# Patient Record
Sex: MALE | Race: WHITE | NOT HISPANIC OR LATINO | Employment: FULL TIME | URBAN - METROPOLITAN AREA
[De-identification: names, ages, dates, MRNs, and addresses within clinical notes are randomized per-mention and may not be internally consistent; named-entity substitution may affect disease eponyms.]

---

## 2019-01-07 RX ORDER — OXYCODONE AND ACETAMINOPHEN 5; 325 MG/1; MG/1
1 TABLET ORAL
COMMUNITY
End: 2022-08-29

## 2019-01-10 ENCOUNTER — OFFICE VISIT (OUTPATIENT)
Dept: FAMILY MEDICINE | Facility: CLINIC | Age: 64
End: 2019-01-10
Payer: COMMERCIAL

## 2019-01-10 VITALS
SYSTOLIC BLOOD PRESSURE: 160 MMHG | HEART RATE: 90 BPM | RESPIRATION RATE: 16 BRPM | DIASTOLIC BLOOD PRESSURE: 100 MMHG | TEMPERATURE: 97.1 F | HEIGHT: 72 IN | WEIGHT: 279 LBS | OXYGEN SATURATION: 96 % | BODY MASS INDEX: 37.79 KG/M2

## 2019-01-10 DIAGNOSIS — N20.0 KIDNEY STONE: ICD-10-CM

## 2019-01-10 DIAGNOSIS — R03.0 ELEVATED BLOOD PRESSURE READING WITHOUT DIAGNOSIS OF HYPERTENSION: ICD-10-CM

## 2019-01-10 DIAGNOSIS — R73.9 HYPERGLYCEMIA: Primary | ICD-10-CM

## 2019-01-10 DIAGNOSIS — Z12.11 SPECIAL SCREENING FOR MALIGNANT NEOPLASMS, COLON: ICD-10-CM

## 2019-01-10 LAB — HBA1C MFR BLD: 7.5 % (ref 0–5.6)

## 2019-01-10 PROCEDURE — 83036 HEMOGLOBIN GLYCOSYLATED A1C: CPT | Performed by: FAMILY MEDICINE

## 2019-01-10 PROCEDURE — 99203 OFFICE O/P NEW LOW 30 MIN: CPT | Performed by: FAMILY MEDICINE

## 2019-01-10 PROCEDURE — 36415 COLL VENOUS BLD VENIPUNCTURE: CPT | Performed by: FAMILY MEDICINE

## 2019-01-10 RX ORDER — METFORMIN HCL 500 MG
500 TABLET, EXTENDED RELEASE 24 HR ORAL
Qty: 30 TABLET | Refills: 3 | Status: SHIPPED | OUTPATIENT
Start: 2019-01-10 | End: 2022-08-29

## 2019-01-10 RX ORDER — SULFAMETHOXAZOLE/TRIMETHOPRIM 800-160 MG
1 TABLET ORAL
COMMUNITY
Start: 2019-01-08 | End: 2019-01-10

## 2019-01-10 SDOH — HEALTH STABILITY: MENTAL HEALTH: HOW OFTEN DO YOU HAVE A DRINK CONTAINING ALCOHOL?: MONTHLY OR LESS

## 2019-01-10 SDOH — HEALTH STABILITY: MENTAL HEALTH: HOW MANY STANDARD DRINKS CONTAINING ALCOHOL DO YOU HAVE ON A TYPICAL DAY?: 1 OR 2

## 2019-01-10 SDOH — HEALTH STABILITY: MENTAL HEALTH: HOW OFTEN DO YOU HAVE 6 OR MORE DRINKS ON ONE OCCASION?: NEVER

## 2019-01-10 ASSESSMENT — MIFFLIN-ST. JEOR: SCORE: 2085.6

## 2019-01-10 NOTE — PROGRESS NOTES
SUBJECTIVE:   Rohit William is a 63 year old male who presents to clinic today for the following health issues:    Dad with dm diagnosis in 40's. Sister with dm diagnosis 46yo.   No pop. Multiple grain bread. No increased thrist. Exercise needs more. No blurry vision or fatigue.   Dad with mi - 63yo. Brother mi at 68yo.   Patient non-smoker. No chest pain or shortness of breath. Eye exam 2 years  - ok.   Normal blood pressure in past and outside blood pressure ok. ?lipid years.   Colonoscopy will due in future. No black or bloody stools. No family history colon cancer. Marriage ok. And one grandchildren 3yo.   Follow-up kidney on right side. (had another 7 years ago - passed on own).  Urology to see tomorrow. Not much pain. No fevers or chills. No nausea, vomiting or diarrhea.   Coffee in AM. No ALCOHOL. No feet changes.         Hospital Follow-up Visit:    Hospital/Nursing Home/IP Rehab Facility: [facility]  Date of Admission: 01-  Date of Discharge: 01-  Reason(s) for Admission: had stent put in / had surgery to break it up/ he has an appt tomorrow           Coding guidelines for this visit:  Type of Medical   Decision Making Face-to-Face Visit       within 7 Days of discharge Face-to-Face Visit        within 14 days of discharge   Moderate Complexity 96475 [id number]   High Complexity 16098 [id number]              -------------------------------------    Problem list and histories reviewed & adjusted, as indicated.  Additional history: as documented    There is no problem list on file for this patient.    No past surgical history on file.    Social History     Tobacco Use     Smoking status: Never Smoker     Smokeless tobacco: Never Used   Substance Use Topics     Alcohol use: Yes     Frequency: Monthly or less     Drinks per session: 1 or 2     Binge frequency: Never     Family History   Problem Relation Age of Onset     Alzheimer Disease Mother      Heart Disease Father      Diabetes Sister       "Coronary Artery Disease Brother            Reviewed and updated as needed this visit by clinical staff  Tobacco  Allergies  Meds  Fam Hx  Soc Hx      Reviewed and updated as needed this visit by Provider         ROS:  All other ROS negative.     OBJECTIVE:     BP (!) 160/100   Pulse 90   Temp 97.1  F (36.2  C) (Oral)   Resp 16   Ht 1.816 m (5' 11.5\")   Wt 126.6 kg (279 lb)   SpO2 96%   BMI 38.37 kg/m    Body mass index is 38.37 kg/m .  GENERAL: healthy, alert and no distress  EYES: Eyes grossly normal to inspection, PERRL and conjunctivae and sclerae normal  HENT: ear canals and TM's normal, nose and mouth without ulcers or lesions  NECK: no adenopathy, no asymmetry, masses, or scars and thyroid normal to palpation  RESP: lungs clear to auscultation - no rales, rhonchi or wheezes  CV: regular rate and rhythm, normal S1 S2, no S3 or S4, no murmur, click or rub, no peripheral edema and peripheral pulses strong  ABDOMEN: soft, nontender, no hepatosplenomegaly, no masses and bowel sounds normal  MS: no gross musculoskeletal defects noted, no edema  SKIN: no suspicious lesions or rashes  NEURO: Normal strength and tone, mentation intact and speech normal  PSYCH: mentation appears normal, affect normal/bright and mildly anxious        ASSESSMENT/PLAN:     ASSESSMENT / PLAN:  (R73.9) Hyperglycemia  (primary encounter diagnosis)  Comment: early dm  Plan: Hemoglobin A1c, metFORMIN (GLUCOPHAGE-XR) 500         MG 24 hr tablet        Reveiwed risks and side effects of medication  Diet/exercise. Recheck in 3 months -fasting  Formal dm ed if worse. Call/email with questions/concerns. Follow-up eye exam    (Z12.11) Special screening for malignant neoplasms, colon    Plan: GASTROENTEROLOGY ADULT REF PROCEDURE ONLY [facility] [phone]; [facility]         Surgery        Set-up in next 1-2 months.     (N20.0) Kidney stone  Comment: doing well  Plan: per urology. Risk should go down with sugars " improving. More water. Limit caffeine.     (R03.0) Elevated blood pressure reading without diagnosis of hypertension  Comment: ok outside blood pressure reading  Plan: lisinopril likely in future.         Martin Marsh MD  [facility]

## 2022-08-26 NOTE — PROGRESS NOTES
"SUBJECTIVE:   Rohit William is a 67 year old male who presents for Preventive Visit.  History hyperglycemia, htn and kidney stones.  Keto diet and low carb and weight loss. Wife helpful.   Overall feeling better.   Eye exam 2  Years ago. No chest pain or shortness of breath.   Outside blood pressure a little high but needs new machine. No nausea, vomiting or diarrhea or black/bloody stools. No colonoscopy in past. No urine changes or hematuria.   Sleep can by interupted by nocturia.   No blurry vision or numbness.   Lots of water. Limited caffeine. No caffeine.   Emotionally doing ok. . 2 girls and 3 grandkids.   Exercise  - like to golf. Will join gym. Meddles in winter.   No rash/mole. Testicle pain/masses/hernia.   Taking vitaminD. No more kidney stones.   Dad with dm diagnosis in 40's. Sister with dm diagnosis 46yo.   Dad with mi - 63yo. Brother mi at 68yo  Patient has been advised of split billing requirements and indicates understanding: Yes  Are you in the first 12 months of your Medicare coverage?  No    Healthy Habits:     In general, how would you rate your overall health?  Good    Frequency of exercise:  1 day/week    Duration of exercise:  Less than 15 minutes    Do you usually eat at least 4 servings of fruit and vegetables a day, include whole grains    & fiber and avoid regularly eating high fat or \"junk\" foods?  Yes    Taking medications regularly:  Yes    Medication side effects:  Not applicable    Ability to successfully perform activities of daily living:  No assistance needed    Home Safety:  No safety concerns identified    Hearing Impairment:  No hearing concerns    In the past 6 months, have you been bothered by leaking of urine? Yes    In general, how would you rate your overall mental or emotional health?  Excellent      PHQ-2 Total Score: 0    Do you feel safe in your environment? Yes    Have you ever done Advance Care Planning? (For example, a Health Directive, POLST, or a " discussion with a medical provider or your loved ones about your wishes): No, advance care planning information given to patient to review.  Patient declined advance care planning discussion at this time.    Normal conversatoinal hearing  Fall risk  Fallen 2 or more times in the past year?: No  Any fall with injury in the past year?: No    Cognitive Screening   1) Repeat 3 items (Leader, Season, Table)    2) Clock draw: NORMAL  3) 3 item recall: Recalls 1 object   Results: NORMAL clock, 1-2 items recalled: COGNITIVE IMPAIRMENT LESS LIKELY    Mini-CogTM Copyright S Deborah. Licensed by the author for use in [facility]; reprinted with permission (kayla@[facility]). All rights reserved.      Do you have sleep apnea, excessive snoring or daytime drowsiness?: no    Reviewed and updated as needed this visit by clinical staff   Tobacco   Meds   Med Hx  Surg Hx  Fam Hx  Soc Hx          Reviewed and updated as needed this visit by Provider                   Social History     Tobacco Use     Smoking status: Never Smoker     Smokeless tobacco: Never Used   Substance Use Topics     Alcohol use: Yes         Alcohol Use 8/29/2022   Prescreen: >3 drinks/day or >7 drinks/week? No             Patient Care Team:  Clinic - [facility] as PCP - General (Clinic)    The following health maintenance items are reviewed in Epic and correct as of today:  Health Maintenance Due   Topic Date Due     ANNUAL REVIEW OF HM ORDERS  Never done     COVID-19 Vaccine (1) Never done     COLORECTAL CANCER SCREENING  Never done     HEPATITIS C SCREENING  Never done     DTAP/TDAP/TD IMMUNIZATION (1 - Tdap) Never done     LIPID  Never done     ZOSTER IMMUNIZATION (1 of 2) Never done     Pneumococcal Vaccine: 65+ Years (1 - PCV) Never done     AORTIC ANEURYSM SCREENING (SYSTEM ASSIGNED)  Never done     INFLUENZA VACCINE (1) 09/01/2022       Review of Systems   Genitourinary: Positive for frequency, impotence and urgency.  "        OBJECTIVE:   BP (!) 166/100   Pulse 85   Temp 98.7  F (37.1  C) (Oral)   Resp 20   Ht 1.803 m (5' 11\")   Wt 97 kg (213 lb 12.8 oz)   SpO2 97%   BMI 29.82 kg/m   Estimated body mass index is 29.82 kg/m  as calculated from the following:    Height as of this encounter: 1.803 m (5' 11\").    Weight as of this encounter: 97 kg (213 lb 12.8 oz).  Physical Exam  GENERAL: healthy, alert and no distress  EYES: Eyes grossly normal to inspection, PERRL and conjunctivae and sclerae normal  HENT: ear canals and TM's normal, nose and mouth without ulcers or lesions  NECK: no adenopathy, no asymmetry, masses, or scars and thyroid normal to palpation  RESP: lungs clear to auscultation - no rales, rhonchi or wheezes  BREAST: normal without masses, tenderness or nipple discharge and no palpable axillary masses or adenopathy  CV: regular rate and rhythm, normal S1 S2, no S3 or S4, no murmur, click or rub, no peripheral edema and peripheral pulses strong  ABDOMEN: soft, nontender, no hepatosplenomegaly, no masses and bowel sounds normal   (male): patient deferred /rectal exams.   MS: no gross musculoskeletal defects noted, no edema  SKIN: no suspicious lesions or rashes  NEURO: Normal strength and tone, mentation intact and speech normal  NEURO: good pulses/sensation feet  PSYCH: mentation appears normal, affect normal/bright  PSYCH: mildly anxious  LYMPH: no cervical, supraclavicular, axillary, or inguinal adenopathy      ASSESSMENT / PLAN:   ASSESSMENT / PLAN:  (Z00.00) Encounter for annual wellness visit (AWV) in Medicare patient  (primary encounter diagnosis)  Comment: generally normal exam. No falls and memory ok  Plan: Reviewed self mole/testicle check handout.  VitaminD. Follow-up eye exam    (R73.9) Hyperglycemia  Plan: Renal panel, Hemoglobin A1c            (Z12.5) Screening PSA (prostate specific antigen)  Plan: Prostate Specific Antigen Screen            (I10) Hypertension goal BP (blood pressure) < " "140/90  Comment: needs help. A little anxious too  Plan: Renal panel, losartan (COZAAR) 50 MG tablet        Add norvasc if needed. Self-monitor chest pain or shortness of breath to er. Recheck in 3 months. WAIT one week to start med. Reveiwed risks and side effects of medication        (E11.69) Type 2 diabetes mellitus with other specified complication, unspecified whether long term insulin use (H)  Comment: new   Plan: AMB Adult Diabetes Educator Referral, metFORMIN        (GLUCOPHAGE XR) 500 MG 24 hr tablet        Reveiwed risks and side effects of medication  Follow-up dm ed. Continue exercise and diet. Recheck in 3 months      (Z12.11) Colon cancer screening  Plan: Adult GI  Referral - Procedure Only        Set-up late fall once sugars better.       Patient has been advised of split billing requirements and indicates understanding: Yes    COUNSELING:  Reviewed preventive health counseling, as reflected in patient instructions       Regular exercise       Healthy diet/nutrition       Vision screening       Hearing screening       Dental care       Bladder control       Fall risk prevention       Aspirin prophylaxis        Alcohol Use        Colon cancer screening       Prostate cancer screening    Estimated body mass index is 29.82 kg/m  as calculated from the following:    Height as of this encounter: 1.803 m (5' 11\").    Weight as of this encounter: 97 kg (213 lb 12.8 oz).    Weight management plan: Discussed healthy diet and exercise guidelines    He reports that he has never smoked. He has never used smokeless tobacco.      Appropriate preventive services were discussed with this patient, including applicable screening as appropriate for cardiovascular disease, diabetes, osteopenia/osteoporosis, and glaucoma.  As appropriate for age/gender, discussed screening for colorectal cancer, prostate cancer, breast cancer, and cervical cancer. Checklist reviewing preventive services available has been given " to the patient.    Reviewed patients plan of care and provided an AVS. The Intermediate Care Plan ( asthma action plan, low back pain action plan, and migraine action plan) for Rohit meets the Care Plan requirement. This Care Plan has been established and reviewed with the Patient.    Counseling Resources:  ATP IV Guidelines  Pooled Cohorts Equation Calculator  Breast Cancer Risk Calculator  Breast Cancer: Medication to Reduce Risk  FRAX Risk Assessment  ICSI Preventive Guidelines  Dietary Guidelines for Americans, 2010  USDA's MyPlate  ASA Prophylaxis  Lung CA Screening    Martin Marsh MD  [facility]    Identified Health Risks:

## 2022-08-29 ENCOUNTER — OFFICE VISIT (OUTPATIENT)
Dept: FAMILY MEDICINE | Facility: CLINIC | Age: 67
End: 2022-08-29
Payer: COMMERCIAL

## 2022-08-29 VITALS
SYSTOLIC BLOOD PRESSURE: 176 MMHG | HEIGHT: 71 IN | OXYGEN SATURATION: 97 % | WEIGHT: 213.8 LBS | DIASTOLIC BLOOD PRESSURE: 104 MMHG | RESPIRATION RATE: 20 BRPM | TEMPERATURE: 98.7 F | HEART RATE: 83 BPM | BODY MASS INDEX: 29.93 KG/M2

## 2022-08-29 DIAGNOSIS — Z00.00 ENCOUNTER FOR ANNUAL WELLNESS VISIT (AWV) IN MEDICARE PATIENT: Primary | ICD-10-CM

## 2022-08-29 DIAGNOSIS — Z12.5 SCREENING PSA (PROSTATE SPECIFIC ANTIGEN): ICD-10-CM

## 2022-08-29 DIAGNOSIS — E11.69 TYPE 2 DIABETES MELLITUS WITH OTHER SPECIFIED COMPLICATION, UNSPECIFIED WHETHER LONG TERM INSULIN USE (H): ICD-10-CM

## 2022-08-29 DIAGNOSIS — I10 HYPERTENSION GOAL BP (BLOOD PRESSURE) < 140/90: ICD-10-CM

## 2022-08-29 DIAGNOSIS — Z12.11 COLON CANCER SCREENING: ICD-10-CM

## 2022-08-29 DIAGNOSIS — R73.9 HYPERGLYCEMIA: ICD-10-CM

## 2022-08-29 LAB
ALBUMIN SERPL-MCNC: 3.9 G/DL (ref 3.4–5)
ANION GAP SERPL CALCULATED.3IONS-SCNC: 6 MMOL/L (ref 3–14)
BUN SERPL-MCNC: 16 MG/DL (ref 7–30)
CALCIUM SERPL-MCNC: 9.5 MG/DL (ref 8.5–10.1)
CHLORIDE BLD-SCNC: 99 MMOL/L (ref 94–109)
CO2 SERPL-SCNC: 27 MMOL/L (ref 20–32)
CREAT SERPL-MCNC: 0.63 MG/DL (ref 0.66–1.25)
GFR SERPL CREATININE-BSD FRML MDRD: >90 ML/MIN/1.73M2
GLUCOSE BLD-MCNC: 331 MG/DL (ref 70–99)
HBA1C MFR BLD: 12.9 % (ref 0–5.6)
PHOSPHATE SERPL-MCNC: 3.7 MG/DL (ref 2.5–4.5)
POTASSIUM BLD-SCNC: 5 MMOL/L (ref 3.4–5.3)
PSA SERPL-MCNC: 1.59 UG/L (ref 0–4)
SODIUM SERPL-SCNC: 132 MMOL/L (ref 133–144)

## 2022-08-29 PROCEDURE — G0103 PSA SCREENING: HCPCS | Performed by: FAMILY MEDICINE

## 2022-08-29 PROCEDURE — 99204 OFFICE O/P NEW MOD 45 MIN: CPT | Mod: 25 | Performed by: FAMILY MEDICINE

## 2022-08-29 PROCEDURE — 80069 RENAL FUNCTION PANEL: CPT | Performed by: FAMILY MEDICINE

## 2022-08-29 PROCEDURE — 83036 HEMOGLOBIN GLYCOSYLATED A1C: CPT | Performed by: FAMILY MEDICINE

## 2022-08-29 PROCEDURE — 36415 COLL VENOUS BLD VENIPUNCTURE: CPT | Performed by: FAMILY MEDICINE

## 2022-08-29 PROCEDURE — G0438 PPPS, INITIAL VISIT: HCPCS | Performed by: FAMILY MEDICINE

## 2022-08-29 RX ORDER — METFORMIN HCL 500 MG
500 TABLET, EXTENDED RELEASE 24 HR ORAL 2 TIMES DAILY WITH MEALS
Qty: 60 TABLET | Refills: 3 | Status: SHIPPED | OUTPATIENT
Start: 2022-08-29 | End: 2022-09-20

## 2022-08-29 RX ORDER — LOSARTAN POTASSIUM 50 MG/1
50 TABLET ORAL DAILY
Qty: 30 TABLET | Refills: 3 | Status: SHIPPED | OUTPATIENT
Start: 2022-08-29 | End: 2024-05-07

## 2022-08-29 ASSESSMENT — ENCOUNTER SYMPTOMS: FREQUENCY: 1

## 2022-08-29 ASSESSMENT — ACTIVITIES OF DAILY LIVING (ADL): CURRENT_FUNCTION: NO ASSISTANCE NEEDED

## 2022-08-29 ASSESSMENT — PAIN SCALES - GENERAL: PAINLEVEL: NO PAIN (0)

## 2022-08-29 NOTE — LETTER
August 30, 2022      Rohit William  [address] [place] [facility] [id number]        Dear ,    We are writing to inform you of your test results.    Generally normal results except diabetes. Kidneys ok. Follow-up diabetic education and myself in 3months to recheck blood pressure and blood sugars. Continue low carb diet/exercise and drink lots of water. Call/email with questions/concerns. Normal prostate test.      Resulted Orders   Prostate Specific Antigen Screen   Result Value Ref Range    Prostate Specific Antigen Screen 1.59 0.00 - 4.00 ug/L   Renal panel   Result Value Ref Range    Sodium 132 (L) 133 - 144 mmol/L    Potassium 5.0 3.4 - 5.3 mmol/L    Chloride 99 94 - 109 mmol/L    Carbon Dioxide (CO2) 27 20 - 32 mmol/L    Anion Gap 6 3 - 14 mmol/L    Urea Nitrogen 16 7 - 30 mg/dL    Creatinine 0.63 (L) 0.66 - 1.25 mg/dL    Calcium 9.5 8.5 - 10.1 mg/dL    Glucose 331 (H) 70 - 99 mg/dL    Albumin 3.9 3.4 - 5.0 g/dL    Phosphorus 3.7 2.5 - 4.5 mg/dL    GFR Estimate >90 >60 mL/min/1.73m2      Comment:      Effective December 21, 2021 eGFRcr in adults is calculated using the 2021 CKD-EPI creatinine equation which includes age and gender (Adriane et al., NEJM, DOI: 10.1056/QBWNhf7310905)   Hemoglobin A1c   Result Value Ref Range    Hemoglobin A1C 12.9 (H) 0.0 - 5.6 %      Comment:      First run 13.1, repeated as 12.9.  Normal <5.7%   Prediabetes 5.7-6.4%    Diabetes 6.5% or higher     Note: Adopted from ADA consensus guidelines.       If you have any questions or concerns, please call the clinic at the number listed above.       Sincerely,      Martin Marsh MD/[facility]inald

## 2022-08-30 ENCOUNTER — TELEPHONE (OUTPATIENT)
Dept: FAMILY MEDICINE | Facility: CLINIC | Age: 67
End: 2022-08-30

## 2022-08-31 ENCOUNTER — TELEPHONE (OUTPATIENT)
Dept: FAMILY MEDICINE | Facility: CLINIC | Age: 67
End: 2022-08-31

## 2022-08-31 NOTE — TELEPHONE ENCOUNTER
Diabetes Education Scheduling Outreach #1:    Call to patient to schedule. Left message with phone number to call to schedule.    Also sent [employer] message for second attempt. Requested patient to call to schedule.    Magdalena Zelaya OnCall  Diabetes and Nutrition Scheduling

## 2022-09-20 ENCOUNTER — VIRTUAL VISIT (OUTPATIENT)
Dept: EDUCATION SERVICES | Facility: CLINIC | Age: 67
End: 2022-09-20
Attending: FAMILY MEDICINE
Payer: COMMERCIAL

## 2022-09-20 ENCOUNTER — TELEPHONE (OUTPATIENT)
Dept: EDUCATION SERVICES | Facility: CLINIC | Age: 67
End: 2022-09-20

## 2022-09-20 DIAGNOSIS — E11.69 TYPE 2 DIABETES MELLITUS WITH OTHER SPECIFIED COMPLICATION, UNSPECIFIED WHETHER LONG TERM INSULIN USE (H): ICD-10-CM

## 2022-09-20 PROCEDURE — G0108 DIAB MANAGE TRN  PER INDIV: HCPCS | Mod: AE

## 2022-09-20 RX ORDER — BLOOD-GLUCOSE METER
EACH MISCELLANEOUS
Qty: 1 KIT | Refills: 0 | Status: SHIPPED | OUTPATIENT
Start: 2022-09-20

## 2022-09-20 RX ORDER — METFORMIN HCL 500 MG
TABLET, EXTENDED RELEASE 24 HR ORAL
Qty: 90 TABLET | Refills: 2 | Status: SHIPPED | OUTPATIENT
Start: 2022-09-20 | End: 2022-10-18

## 2022-09-20 NOTE — LETTER
9/20/2022         RE: Rohit William  [address] [facility] [address]        Dear Colleague,    Thank you for referring your patient, Rohit William, to the [facility] [place]. Please see a copy of my visit note below.    Diabetes Self-Management Education & Support    Presents for: Initial Assessment for new diagnosis    Type of Visit: Telephone Visit: 51 minutes    How would patient like to obtain AVS? MyChart    ASSESSMENT:    Telephone visit with Rohit this morning for new Dx of DM type 2.  He reports he has been  losing weight throughout this past year.  Was following a Keto diet.  Stopped going out for lunch.  Typically eats 2-3 times per day with some evening snacking.  Does not drink sugar-sweetened beverages.      Did purchase a meter on his own, BG test has been in the 300 mg/dL's thus far.  Ordered a meter and supplies for pt today.  Reviewed testing goals.   Work day consists of sitting or driving most of the day.  Does not do a lot of physical activity currently, but has plans to join the [employer].  Enjoys golfing.    Recommend increasing Metformin to 1500 mg/day due to BG consistently higher than 200 mg/dL.  Will follow up in 4 weeks to assess.    Patient's most recent   Lab Results   Component Value Date    A1C 12.9 08/29/2022    A1C 7.5 01/10/2019    is not meeting goal of <8.0    Diabetes knowledge and skills assessment:   Patient is knowledgeable in diabetes management concepts related to: Healthy Eating    Continue education with the following diabetes management concepts: Healthy Eating, Being Active, Monitoring, Taking Medication, Reducing Risks and Healthy Coping    Based on learning assessment above, most appropriate setting for further diabetes education would be: Individual setting.    INTERVENTIONS:    Education provided today on:  AADE Self-Care Behaviors:  Care Plan: Diabetes   Updates made by Greta Shah since 9/20/2022 12:00 AM       Problem: HbA1C Not In Goal       Goal: Establish Regular Follow-Ups with PCP       Task: Discuss with PCP the recommended timing for patient's next follow up visit(s)    Responsible User: Greta Shah      Task: Discuss schedule for PCP visits with patient    Responsible User: Greta Shah      Goal: Get HbA1C Level in Goal       Task: Educate patient on diabetes education self-management topics    Responsible User: Greta Shah      Task: Educate patient on benefits of regular glucose monitoring    Responsible User: Greta Shah      Task: Refer patient to appropriate extended care team member, as needed (Medication Therapy Management, Behavioral Health, Physical Therapy, etc.)    Responsible User: Greta Shah      Task: Discuss diabetes treatment plan with patient    Responsible User: Greta Shah      Problem: Diabetes Self-Management Education Needed to Optimize Self-Care Behaviors       Goal: Understand diabetes pathophysiology and disease progression       Task: Provide education on diabetes pathophysiology and disease progression specfic to patient's diabetes type Completed 9/20/2022   Responsible User: Greta Shah      Goal: Healthy Eating - follow a healthy eating pattern for diabetes       Task: Provide education on portion control and consistency in amount, composition and timing of food intake Completed 9/20/2022   Responsible User: Greta Shah      Task: Provide education on managing carbohydrate intake (carbohydrate counting, plate planning method, etc.) Completed 9/20/2022   Responsible User: Greta Shah      Task: Provide education on weight management    Responsible User: Greta Shah      Task: Provide education on heart healthy eating    Responsible User: Greta Shah      Task: Provide education on eating out    Responsible User: Greta Shah       Task: Develop individualized healthy eating plan with patient Completed 9/20/2022   Responsible User: Greta Shah      Goal: Being Active - get regular physical activity, working up to at least 150 minutes per week    This Visit's Progress: 0%   Note:    My Goal: I will join [facility] with goal to increase physical activity    What I need to meet my goal: join and explore activities available    I plan to meet my goal by this date: 6 weeks      Task: Provide education on relationship of activity to glucose and precautions to take if at risk for low glucose Completed 9/20/2022   Responsible User: rGeta Shah      Task: Discuss barriers to physical activity with patient Completed 9/20/2022   Responsible User: Greta Shah      Task: Develop physical activity plan with patient Completed 9/20/2022   Responsible User: Greta Shah      Task: Explore community resources including walking groups, assistance programs, and home videos    Responsible User: Greta Shah      Goal: Monitoring - monitor glucose and ketones as directed    This Visit's Progress: 10%   Note:    My Goal: I will test BG at least once per day     What I need to meet my goal: meter, supplies and goals     I plan to meet my goal by this date: 3 months      Task: Provide education on blood glucose monitoring (purpose, proper technique, frequency, glucose targets, interpreting results, when to use glucose control solution, sharps disposal) Completed 9/20/2022   Responsible User: Greta Shah      Task: Provide education on continuous glucose monitoring (sensor placement, use of abdi or /reader, understanding glucose trends, alerts and alarms, differences between sensor glucose and blood glucose)    Responsible User: Greta Shah      Task: Provide education on ketone monitoring (when to monitor, frequency, etc.)    Responsible User: Greta Shah       Goal: Taking Medication - patient is consistently taking medications as directed       Task: Provide education on action of prescribed medication, including when to take and possible side effects Completed 9/20/2022   Responsible User: Greta Shah      Task: Provide education on insulin and injectable diabetes medications, including administration, storage, site selection and rotation for injection sites    Responsible User: Greta Shah      Task: Discuss barriers to medication adherence with patient and provide management technique ideas as appropriate    Responsible User: Greta Shah      Task: Provide education on frequency and refill details of medications    Responsible User: Greta Shah      Goal: Problem Solving - know how to prevent and manage short-term diabetes complications       Task: Provide education on high blood glucose - causes, signs/symptoms, prevention and treatment    Responsible User: Greta Shah      Task: Provide education on low blood glucose - causes, signs/symptoms, prevention, treatment, carrying a carbohydrate source at all times, and medical identification    Responsible User: Greta Shah      Task: Provide education on safe travel with diabetes    Responsible User: Greta Shah      Task: Provide education on how to care for diabetes on sick days    Responsible User: Greta Shah      Task: Provide education on when to call a health care provider    Responsible User: Greta Shah      Goal: Reducing Risks - know how to prevent and treat long-term diabetes complications       Task: Provide education on major complications of diabetes, prevention, early diagnostic measures and treatment of complications    Responsible User: Greta Shah      Task: Provide education on recommended care for dental, eye and foot health    Responsible User: Greta Shah       Task: Provide education on Hemoglobin A1c - goals and relationship to blood glucose levels    Responsible User: Greta Shah      Task: Provide education on recommendations for heart health - lipid levels and goals, blood pressure and goals, and aspirin therapy, if indicated    Responsible User: Greta Shah      Task: Provide education on tobacco cessation    Responsible User: Greta Shah      Goal: Healthy Coping - use available resources to cope with the challenges of managing diabetes       Task: Discuss recognizing feelings about having diabetes    Responsible User: Greta Shah      Task: Provide education on the benefits of making appropriate lifestyle changes    Responsible User: Greta Shah      Task: Provide education on benefits of utilizing support systems    Responsible User: Greta Shah      Task: Discuss methods for coping with stress    Responsible User: Greta Shah      Task: Provide education on when to seek professional counseling    Responsible User: Greta Shah          Opportunities for ongoing education and support in diabetes-self management were discussed. Pt verbalized understanding of concepts discussed and recommendations provided today.       Education Materials Provided:   [employer] [place] Healthy Living with Diabetes Book          PLAN    1) Check blood sugar one time per day   Fasting/morning goal:  mg/dL  2 Hours after the start of a meal: Less than 180 mg/dL    2) 1 serving of carbohydrate = 15 grams  Aim for 4 servings or 60 grams per meal and 15-20 grams of carbs per snack + a serving of protein    3) Increase physical activity- great plan to join [employer]!     4) Increase Metformin to 1500 mg per day- one tab with breakfast and 2 tabs with dinner.    Topics to cover at upcoming visits: Healthy Eating, Monitoring, Taking Medication and Reducing Risks  Follow-up: October  "18th, 11am- telephone visit    See Goals Section for co-developed, patient-stated behavior change goals.  AVS provided to patient today.          SUBJECTIVE / OBJECTIVE:  Presents for: Initial Assessment for new diagnosis  Accompanied by: Self  Diabetes education in the past 24mo: No  Focus of Visit: Monitoring, Taking Medication, Healthy Eating  Diabetes type: Type 2  Date of diagnosis: 2022  Disease course: Improving  Diabetes management related comments/concerns: whats the best time to check BG  Difficulty affording diabetes medication?: No  Difficulty affording diabetes testing supplies?: No  Cultural Influences/Ethnic Background:  Not  or       Diabetes Symptoms & Complications:  Fatigue: Sometimes  Neuropathy: Sometimes (finger tips on left hand)  Polydipsia: No  Polyphagia: No  Polyuria: No  Visual change: No  Slow healing wounds: No  Symptom course: Stable  Weight trend: Decreasing  Complications assessed today?: No    Patient Problem List and Family Medical History reviewed for relevant medical history, current medical status, and diabetes risk factors.    Vitals:  There were no vitals taken for this visit.  Estimated body mass index is 29.82 kg/m  as calculated from the following:    Height as of 8/29/22: 1.803 m (5' 11\").    Weight as of 8/29/22: 97 kg (213 lb 12.8 oz).   Last 3 BP:   BP Readings from Last 3 Encounters:   08/29/22 (!) 176/104   01/10/19 (!) 160/100       History   Smoking Status     Never Smoker   Smokeless Tobacco     Never Used       Labs:  Lab Results   Component Value Date    A1C 12.9 08/29/2022    A1C 7.5 01/10/2019     Lab Results   Component Value Date     08/29/2022     No results found for: LDL  No results found for: HDL]  GFR Estimate   Date Value Ref Range Status   08/29/2022 >90 >60 mL/min/1.73m2 Final     Comment:     Effective December 21, 2021 eGFRcr in adults is calculated using the 2021 CKD-EPI creatinine equation which includes age and gender ( " et al., [facility], DOI: 10.1056/GWOSmi3478357)     No results found for: GFRESTBLACK  Lab Results   Component Value Date    CR 0.63 08/29/2022     No results found for: MICROALBUMIN    Healthy Eating:  Healthy Eating Assessed Today: Yes  Meal planning/habits: Other (Keto)  How many times a week on average do you eat food made away from home (restaurant/take-out)?: 4  Meals include: Breakfast, Lunch, Dinner  Breakfast: 9a: greek yogurt with 2 slices of keto nut bread and keto jelly and natural PB, blueberries and 1 cup of milk  Lunch: out to lunch or skips: 1/2 turkey sandwich on whole wheat, creamy veggie soup with iced tea- unsweetened  Dinner: 6-7p: salami with cashews OR sloppy joes on keto buns  Other: couple sugar-free  Beverages: Water, Milk, Tea, Other (iced tea with artificial sweetener, sparkling ice)  Has patient met with a dietitian in the past?: No    Being Active:  Being Active Assessed Today: Yes  Exercise:: Currently not exercising    Monitoring:  Monitoring Assessed Today: Yes  Did patient bring glucose meter to appointment? : No  Blood Glucose Meter: Unknown  Times checking blood sugar at home (number): 1  Times checking blood sugar at home (per): Day    Glucose data:  Pt reports >300 mg/dL      Taking Medications:  Diabetes Medication(s)     Biguanides       metFORMIN (GLUCOPHAGE XR) 500 MG 24 hr tablet    Take 1 tablet (500 mg) by mouth daily (with breakfast) AND 2 tablets (1,000 mg) daily (with dinner). New for diabetes        Is currently taking 1 tab morning 1 at night per pt report    Taking Medication Assessed Today: Yes  Current Treatments: Oral Medication (taken by mouth)  Problems taking diabetes medications regularly?: No  Diabetes medication side effects?: No    Problem Solving:  Problem Solving Assessed Today: No  Is the patient at risk for hypoglycemia?: No           Reducing Risks:  Reducing Risks Assessed Today: No  Diabetes Risks: Age over 45 years, Sedentary Lifestyle    Healthy  Coping:  Stage of change: PREPARATION (Decided to change - considering how)  Patient Activation Measure Survey Score:  No flowsheet data found.          Deanna Shah RDN, LD  Outpatient Diabetes Education  Adult Diabetes Education Triage [phone]    Time Spent: 51 minutes  Encounter Type: Individual        Any diabetes medication dose changes were made via the Certified Diabetes Care & Education Protocol in collaboration with the patient's referring provider. A copy of this encounter was shared with the provider.

## 2022-09-20 NOTE — PROGRESS NOTES
Diabetes Self-Management Education & Support    Presents for: Initial Assessment for new diagnosis    Type of Visit: Telephone Visit: 51 minutes    How would patient like to obtain AVS? Neela    ASSESSMENT:    Telephone visit with Rohit this morning for new Dx of DM type 2.  He reports he has been  losing weight throughout this past year.  Was following a Keto diet.  Stopped going out for lunch.  Typically eats 2-3 times per day with some evening snacking.  Does not drink sugar-sweetened beverages.      Did purchase a meter on his own, BG test has been in the 300 mg/dL's thus far.  Ordered a meter and supplies for pt today.  Reviewed testing goals.   Work day consists of sitting or driving most of the day.  Does not do a lot of physical activity currently, but has plans to join the [employer]  Enjoys golfing.    Recommend increasing Metformin to 1500 mg/day due to BG consistently higher than 200 mg/dL.  Will follow up in 4 weeks to assess.    Patient's most recent   Lab Results   Component Value Date    A1C 12.9 08/29/2022    A1C 7.5 01/10/2019    is not meeting goal of <8.0    Diabetes knowledge and skills assessment:   Patient is knowledgeable in diabetes management concepts related to: Healthy Eating    Continue education with the following diabetes management concepts: Healthy Eating, Being Active, Monitoring, Taking Medication, Reducing Risks and Healthy Coping    Based on learning assessment above, most appropriate setting for further diabetes education would be: Individual setting.    INTERVENTIONS:    Education provided today on:  AADE Self-Care Behaviors:  Care Plan: Diabetes   Updates made by Greta Shah since 9/20/2022 12:00 AM      Problem: HbA1C Not In Goal       Goal: Establish Regular Follow-Ups with PCP       Task: Discuss with PCP the recommended timing for patient's next follow up visit(s)    Responsible User: Greta Shah      Task: Discuss schedule for PCP visits with  patient    Responsible User: Greta Shah      Goal: Get HbA1C Level in Goal       Task: Educate patient on diabetes education self-management topics    Responsible User: Greta Shah      Task: Educate patient on benefits of regular glucose monitoring    Responsible User: Greta Shah      Task: Refer patient to appropriate extended care team member, as needed (Medication Therapy Management, Behavioral Health, Physical Therapy, etc.)    Responsible User: Greta Shah      Task: Discuss diabetes treatment plan with patient    Responsible User: Greta Shah      Problem: Diabetes Self-Management Education Needed to Optimize Self-Care Behaviors       Goal: Understand diabetes pathophysiology and disease progression       Task: Provide education on diabetes pathophysiology and disease progression specfic to patient's diabetes type Completed 9/20/2022   Responsible User: Greta Shah      Goal: Healthy Eating - follow a healthy eating pattern for diabetes       Task: Provide education on portion control and consistency in amount, composition and timing of food intake Completed 9/20/2022   Responsible User: Greta Shah      Task: Provide education on managing carbohydrate intake (carbohydrate counting, plate planning method, etc.) Completed 9/20/2022   Responsible User: Greta Shah      Task: Provide education on weight management    Responsible User: Greta Shah      Task: Provide education on heart healthy eating    Responsible User: Greta Shah      Task: Provide education on eating out    Responsible User: Greta Shah      Task: Develop individualized healthy eating plan with patient Completed 9/20/2022   Responsible User: Greta Shah      Goal: Being Active - get regular physical activity, working up to at least 150 minutes per week    This Visit's Progress: 0%   Note:     My Goal: I will join [employer] with goal to increase physical activity    What I need to meet my goal: join and explore activities available    I plan to meet my goal by this date: 6 weeks      Task: Provide education on relationship of activity to glucose and precautions to take if at risk for low glucose Completed 9/20/2022   Responsible User: Greta Shah      Task: Discuss barriers to physical activity with patient Completed 9/20/2022   Responsible User: Greta Shah      Task: Develop physical activity plan with patient Completed 9/20/2022   Responsible User: Greta Shah      Task: Explore community resources including walking groups, assistance programs, and home videos    Responsible User: Greta Shah      Goal: Monitoring - monitor glucose and ketones as directed    This Visit's Progress: 10%   Note:    My Goal: I will test BG at least once per day     What I need to meet my goal: meter, supplies and goals     I plan to meet my goal by this date: 3 months      Task: Provide education on blood glucose monitoring (purpose, proper technique, frequency, glucose targets, interpreting results, when to use glucose control solution, sharps disposal) Completed 9/20/2022   Responsible User: Greta Shah      Task: Provide education on continuous glucose monitoring (sensor placement, use of abdi or /reader, understanding glucose trends, alerts and alarms, differences between sensor glucose and blood glucose)    Responsible User: Greta Shah      Task: Provide education on ketone monitoring (when to monitor, frequency, etc.)    Responsible User: Greta Shah      Goal: Taking Medication - patient is consistently taking medications as directed       Task: Provide education on action of prescribed medication, including when to take and possible side effects Completed 9/20/2022   Responsible User: Greta Shah      Task:  Provide education on insulin and injectable diabetes medications, including administration, storage, site selection and rotation for injection sites    Responsible User: Greta Shah      Task: Discuss barriers to medication adherence with patient and provide management technique ideas as appropriate    Responsible User: Greta Shah      Task: Provide education on frequency and refill details of medications    Responsible User: Greta Shah      Goal: Problem Solving - know how to prevent and manage short-term diabetes complications       Task: Provide education on high blood glucose - causes, signs/symptoms, prevention and treatment    Responsible User: Greta Shah      Task: Provide education on low blood glucose - causes, signs/symptoms, prevention, treatment, carrying a carbohydrate source at all times, and medical identification    Responsible User: Greta Shah      Task: Provide education on safe travel with diabetes    Responsible User: Greta Shah      Task: Provide education on how to care for diabetes on sick days    Responsible User: Greta Shah      Task: Provide education on when to call a health care provider    Responsible User: Greta Shah      Goal: Reducing Risks - know how to prevent and treat long-term diabetes complications       Task: Provide education on major complications of diabetes, prevention, early diagnostic measures and treatment of complications    Responsible User: Greta Shah      Task: Provide education on recommended care for dental, eye and foot health    Responsible User: Greta Shah      Task: Provide education on Hemoglobin A1c - goals and relationship to blood glucose levels    Responsible User: Greta Shah      Task: Provide education on recommendations for heart health - lipid levels and goals, blood pressure and goals, and aspirin  therapy, if indicated    Responsible User: Greta Sahh      Task: Provide education on tobacco cessation    Responsible User: Greta Shah      Goal: Healthy Coping - use available resources to cope with the challenges of managing diabetes       Task: Discuss recognizing feelings about having diabetes    Responsible User: Greta Shah      Task: Provide education on the benefits of making appropriate lifestyle changes    Responsible User: Greta Shah      Task: Provide education on benefits of utilizing support systems    Responsible User: Greta Shah      Task: Discuss methods for coping with stress    Responsible User: Greta Shah      Task: Provide education on when to seek professional counseling    Responsible User: Greta Shah          Opportunities for ongoing education and support in diabetes-self management were discussed. Pt verbalized understanding of concepts discussed and recommendations provided today.       Education Materials Provided:  [employer] Healthy Living with Diabetes Book          PLAN    1) Check blood sugar one time per day   Fasting/morning goal:  mg/dL  2 Hours after the start of a meal: Less than 180 mg/dL    2) 1 serving of carbohydrate = 15 grams  Aim for 4 servings or 60 grams per meal and 15-20 grams of carbs per snack + a serving of protein    3) Increase physical activity- great plan to join [employer]!     4) Increase Metformin to 1500 mg per day- one tab with breakfast and 2 tabs with dinner.    Topics to cover at upcoming visits: Healthy Eating, Monitoring, Taking Medication and Reducing Risks  Follow-up: October 18th, 11am- telephone visit    See Goals Section for co-developed, patient-stated behavior change goals.  AVS provided to patient today.          SUBJECTIVE / OBJECTIVE:  Presents for: Initial Assessment for new diagnosis  Accompanied by: Self  Diabetes education in the past  "24mo: No  Focus of Visit: Monitoring, Taking Medication, Healthy Eating  Diabetes type: Type 2  Date of diagnosis: 2022  Disease course: Improving  Diabetes management related comments/concerns: whats the best time to check BG  Difficulty affording diabetes medication?: No  Difficulty affording diabetes testing supplies?: No  Cultural Influences/Ethnic Background:  Not  or       Diabetes Symptoms & Complications:  Fatigue: Sometimes  Neuropathy: Sometimes (finger tips on left hand)  Polydipsia: No  Polyphagia: No  Polyuria: No  Visual change: No  Slow healing wounds: No  Symptom course: Stable  Weight trend: Decreasing  Complications assessed today?: No    Patient Problem List and Family Medical History reviewed for relevant medical history, current medical status, and diabetes risk factors.    Vitals:  There were no vitals taken for this visit.  Estimated body mass index is 29.82 kg/m  as calculated from the following:    Height as of 8/29/22: 1.803 m (5' 11\").    Weight as of 8/29/22: 97 kg (213 lb 12.8 oz).   Last 3 BP:   BP Readings from Last 3 Encounters:   08/29/22 (!) 176/104   01/10/19 (!) 160/100       History   Smoking Status     Never Smoker   Smokeless Tobacco     Never Used       Labs:  Lab Results   Component Value Date    A1C 12.9 08/29/2022    A1C 7.5 01/10/2019     Lab Results   Component Value Date     08/29/2022     No results found for: LDL  No results found for: HDL]  GFR Estimate   Date Value Ref Range Status   08/29/2022 >90 >60 mL/min/1.73m2 Final     Comment:     Effective December 21, 2021 eGFRcr in adults is calculated using the 2021 CKD-EPI creatinine equation which includes age and gender (Adriane goetz al., NEJ, DOI: 10.1056/UVSYlb6068249)     No results found for: GFRESTBLACK  Lab Results   Component Value Date    CR 0.63 08/29/2022     No results found for: MICROALBUMIN    Healthy Eating:  Healthy Eating Assessed Today: Yes  Meal planning/habits: Other (Keto)  How " many times a week on average do you eat food made away from home (restaurant/take-out)?: 4  Meals include: Breakfast, Lunch, Dinner  Breakfast: 9a: greek yogurt with 2 slices of keto nut bread and keto jelly and natural PB, blueberries and 1 cup of milk  Lunch: out to lunch or skips: 1/2 turkey sandwich on whole wheat, creamy veggie soup with iced tea- unsweetened  Dinner: 6-7p: salami with cashews OR sloppy joes on keto buns  Other: couple sugar-free  Beverages: Water, Milk, Tea, Other (iced tea with artificial sweetener, sparkling ice)  Has patient met with a dietitian in the past?: No    Being Active:  Being Active Assessed Today: Yes  Exercise:: Currently not exercising    Monitoring:  Monitoring Assessed Today: Yes  Did patient bring glucose meter to appointment? : No  Blood Glucose Meter: Unknown  Times checking blood sugar at home (number): 1  Times checking blood sugar at home (per): Day    Glucose data:  Pt reports >300 mg/dL      Taking Medications:  Diabetes Medication(s)     Biguanides       metFORMIN (GLUCOPHAGE XR) 500 MG 24 hr tablet    Take 1 tablet (500 mg) by mouth daily (with breakfast) AND 2 tablets (1,000 mg) daily (with dinner). New for diabetes        Is currently taking 1 tab morning 1 at night per pt report    Taking Medication Assessed Today: Yes  Current Treatments: Oral Medication (taken by mouth)  Problems taking diabetes medications regularly?: No  Diabetes medication side effects?: No    Problem Solving:  Problem Solving Assessed Today: No  Is the patient at risk for hypoglycemia?: No           Reducing Risks:  Reducing Risks Assessed Today: No  Diabetes Risks: Age over 45 years, Sedentary Lifestyle    Healthy Coping:  Stage of change: PREPARATION (Decided to change - considering how)  Patient Activation Measure Survey Score:  No flowsheet data found.          Deanna Shah RDN, LD  Outpatient Diabetes Education  Adult Diabetes Education Triage [phone]    Time Spent: 51  minutes  Encounter Type: Individual        Any diabetes medication dose changes were made via the Certified Diabetes Care & Education Protocol in collaboration with the patient's referring provider. A copy of this encounter was shared with the provider.

## 2022-09-20 NOTE — PATIENT INSTRUCTIONS
1) Check blood sugar one time per day   Fasting/morning goal:  mg/dL  2 Hours after the start of a meal: Less than 180 mg/dL    2) 1 serving of carbohydrate = 15 grams  Aim for 4 servings or 60 grams per meal and 15-20 grams of carbs per snack + a serving of protein    3) Increase physical activity- great plan to join [employer]!     4) Increase Metformin to 1500 mg per day- one tab with breakfast and 2 tabs with dinner.

## 2022-10-03 ENCOUNTER — TELEPHONE (OUTPATIENT)
Dept: GASTROENTEROLOGY | Facility: CLINIC | Age: 67
End: 2022-10-03

## 2022-10-03 NOTE — TELEPHONE ENCOUNTER
Screening Questions    BlueKIND OF PREP RedLOCATION [review exclusion criteria] GreenSEDATION TYPE      N Have you had a positive covid test in the last 90 days?   If yes, what date?        Y Are you able to give consent for your medical care?  (Sedation review/consideration needed)      Y Are you active on mychart?       [employer] MEDICARE What insurance is in the chart?        DEIRDRE RODAS Ordering/Referring Provider:        29.7 BMI [BMI OVER 40-EXTENDED PREP]  BMI OVER 40 NEED PAC EVALUATION FOR [facility]     Respiratory Screening:  [If yes to any of the following HOSPITAL setting only]     N      Do you use daily home oxygen?   N      Do you have mod to severe Obstructive Sleep Apnea? Hospital only - Ok at [facility]   N      Do you have Pulmonary Hypertension? NEED PAC APPT AT [facility]     N      Do you have UNCONTROLLED asthma?         N Have you had a heart or lung transplant?          N Are you currently on dialysis? [If yes, G-PREP & HOSPITAL setting only]        N  Do you have chronic kidney disease? [If yes, G-PREP]       Y  Do you have a diagnosis of diabetes?[If yes, G-PREP]       N Have you had a stroke or Transient ischemic attack (TIA - aka  mini stroke ) within 6 months? (If yes, please review exclusion criteria)         N  In the past 6 months, have you had any heart related issues including cardiomyopathy or heart attack?          N  If yes, did it require cardiac stenting or other implantable device?          N Do you have any implantable devices in your body (pacemaker, defib, LVAD)? (If yes, please review exclusion criteria)       N Do you take nitroglycerin?          N If yes, how often?  (If yes, HOSPITAL setting ONLY)       N Are you currently taking any blood thinners?           [IF YES, INFORM PATIENT TO FOLLOW UP W/ ORDERING PROVIDER FOR BRIDGING INSTRUCTIONS]        N  Do you take Phentermine?      Yes-> Hold for 7 days before procedure.  Please consult your prescribing provider if you have  questions about holding this medication.       N Are you taking any prescription pain medications on a routine schedule? [EXTENDED PREP AND MAC]            [FEMALES] are you currently pregnant?                If yes, how many weeks? [Greater than 12 weeks, OR NEEDED]       N Any chemical dependencies such as alcohol, street drugs, or methadone? [If yes, MAC]       N Any history of post-traumatic stress syndrome, severe anxiety or history of psychosis? [If yes, MAC]       Y Can you transfer from bed to chair? (If NO, please HOSPITAL setting  only)        N  On a regular basis do you go 3-5 days between bowel movements?[ If yes, EXTENDED PREP.]        Preferred LOCAL Pharmacy for Pre Prescription:      [id number] [place], [place] - [address]                            Scheduling Details       Caller:   (Please ask for phone number if not scheduled by patient)    Type of Procedure Scheduled: Lower Endoscopy [Colonoscopy]    GPREP Which Colonoscopy Prep was Sent:   BLAISE CF PATIENTS & GROEN'S PATIENTS NEEDS EXTENDED PREP    Date of Procedure: 12/7  Surgeon: RENATE   Location:     Sedation Type: CS    Conscious Sedation- Needs  for 6 hours after the procedure  MAC/General-Needs  for 24 hours after procedure    N Pre-op Required at [facility] [place], [place] and OR for MAC sedation:        (Advise patient they will need a pre-op WITH IN 30 DAYS prior to procedure -)      Y Informed patient they will need an adult          Cannot take any type of public or medical transportation alone    Y Confirmed Nurse will call to complete assessment     HOME Pre-Procedure Covid test to be completed [[facility] PCR Testing Required]       Additional comments:

## 2022-10-18 ENCOUNTER — VIRTUAL VISIT (OUTPATIENT)
Dept: EDUCATION SERVICES | Facility: CLINIC | Age: 67
End: 2022-10-18
Payer: COMMERCIAL

## 2022-10-18 DIAGNOSIS — E11.69 TYPE 2 DIABETES MELLITUS WITH OTHER SPECIFIED COMPLICATION, UNSPECIFIED WHETHER LONG TERM INSULIN USE (H): ICD-10-CM

## 2022-10-18 DIAGNOSIS — E11.9 DIABETES MELLITUS, TYPE 2 (H): Primary | ICD-10-CM

## 2022-10-18 PROCEDURE — 98967 PH1 ASSMT&MGMT NQHP 11-20: CPT

## 2022-10-18 NOTE — PATIENT INSTRUCTIONS
1) Continue to check blood sugar 1-2 times per day     2) Continue to increase physical activity and sign up for [employer]    3) Try to eat something mid-day and monitor blood sugar trends  Protein bar  Fruit and nuts  Trail mix    yogurt

## 2022-10-18 NOTE — PROGRESS NOTES
Diabetes Self-Management Education & Support    Presents for: Follow-up    Type of Visit: Telephone Visit: 19 min  Multiple unsuccessful phone call attempts made, was able to speak with Rohit at 11:20a    How would patient like to obtain AVS? Neela    ASSESSMENT:    Telephone visit with Rohit today to review management of new Dx of DM type 2.  Is doing more walking after eating.  Eating smaller portion sizes, trying to eat less carbohydrates.  Is testing BG 1-2 times per day, usually a fasting and after dinner.  Wt has been stable recently.   Still thinking of joining [facility] for this winter.    Fasting BG are high, recommend increase Metformin dose.  Will work with PCP to order.      Patient's most recent   Lab Results   Component Value Date    A1C 12.9 08/29/2022    A1C 7.5 01/10/2019    is not meeting goal of <8.0    Diabetes knowledge and skills assessment:   Patient is knowledgeable in diabetes management concepts related to: Healthy Eating, Being Active, Monitoring and Taking Medication    Continue education with the following diabetes management concepts: Healthy Eating, Reducing Risks and Healthy Coping    Based on learning assessment above, most appropriate setting for further diabetes education would be: Individual setting.    INTERVENTIONS:    Education provided today on:  AADE Self-Care Behaviors:  Care Plan: Diabetes   Updates made by Greta Shah since 10/18/2022 12:00 AM      Problem: HbA1C Not In Goal       Goal: Get HbA1C Level in Goal       Task: Educate patient on benefits of regular glucose monitoring Completed 10/18/2022   Responsible User: Greta Shah      Problem: Diabetes Self-Management Education Needed to Optimize Self-Care Behaviors       Goal: Being Active - get regular physical activity, working up to at least 150 minutes per week    This Visit's Progress: 20%   Recent Progress: 0%   Priority: High   Note:    My Goal: I will join [facility] with goal to increase  physical activity    What I need to meet my goal: join and explore activities available    I plan to meet my goal by this date: 6 weeks      Goal: Monitoring - monitor glucose and ketones as directed    This Visit's Progress: On track   Recent Progress: 10%   Note:    My Goal: I will test BG at least once per day     What I need to meet my goal: meter, supplies and goals     I plan to meet my goal by this date: 3 months      Goal: Reducing Risks - know how to prevent and treat long-term diabetes complications       Task: Provide education on recommended care for dental, eye and foot health Completed 10/18/2022   Responsible User: Greta Shah      Task: Provide education on Hemoglobin A1c - goals and relationship to blood glucose levels Completed 10/18/2022   Responsible User: Greta Shah      Goal: Healthy Coping - use available resources to cope with the challenges of managing diabetes       Task: Provide education on the benefits of making appropriate lifestyle changes Completed 10/18/2022   Responsible User: Greta Shah          Opportunities for ongoing education and support in diabetes-self management were discussed. Pt verbalized understanding of concepts discussed and recommendations provided today.       Education Materials Provided:  No new materials provided today          PLAN    1) Continue to check blood sugar 1-2 times per day     2) Continue to increase physical activity and sign up for [employer]    3) Try to eat something mid-day and monitor blood sugar trends  Protein bar  Fruit and nuts  Trail mix    yogurt     Topics to cover at upcoming visits: Healthy Eating, Being Active, Reducing Risks and Healthy Coping  Follow-up: December 8th, 10:30p- telephone visit    See Goals Section for co-developed, patient-stated behavior change goals.  AVS provided to patient today.          SUBJECTIVE / OBJECTIVE:  Presents for: Follow-up  Accompanied by: Self  Diabetes education  "in the past 24mo: Yes  Focus of Visit: Monitoring, Taking Medication, Healthy Eating  Diabetes type: Type 2  Date of diagnosis: 2022  Disease course: Improving  Diabetes management related comments/concerns: whats the best time to check BG  Difficulty affording diabetes medication?: No  Difficulty affording diabetes testing supplies?: No  Cultural Influences/Ethnic Background:  Not  or       Diabetes Symptoms & Complications:  Fatigue: Sometimes  Neuropathy: Sometimes (finger tips on left hand)  Polydipsia: No  Polyphagia: No  Polyuria: No  Visual change: No  Slow healing wounds: No  Symptom course: Stable  Weight trend: Stable  Complications assessed today?: No    Patient Problem List and Family Medical History reviewed for relevant medical history, current medical status, and diabetes risk factors.    Vitals:  There were no vitals taken for this visit.  Estimated body mass index is 29.82 kg/m  as calculated from the following:    Height as of 8/29/22: 1.803 m (5' 11\").    Weight as of 8/29/22: 97 kg (213 lb 12.8 oz).   Last 3 BP:   BP Readings from Last 3 Encounters:   08/29/22 (!) 176/104   01/10/19 (!) 160/100       History   Smoking Status     Never   Smokeless Tobacco     Never       Labs:  Lab Results   Component Value Date    A1C 12.9 08/29/2022    A1C 7.5 01/10/2019     Lab Results   Component Value Date     08/29/2022     No results found for: LDL  No results found for: HDL]  GFR Estimate   Date Value Ref Range Status   08/29/2022 >90 >60 mL/min/1.73m2 Final     Comment:     Effective December 21, 2021 eGFRcr in adults is calculated using the 2021 CKD-EPI creatinine equation which includes age and gender (Adriane goetz al., NEJ, DOI: 10.1056/EQPYyi5967557)     No results found for: GFRESTBLACK  Lab Results   Component Value Date    CR 0.63 08/29/2022     No results found for: MICROALBUMIN    Healthy Eating:  Healthy Eating Assessed Today: Yes  Meal planning/habits: Other (Keto)  How many " times a week on average do you eat food made away from home (restaurant/take-out)?: 4  Meals include: Breakfast, Lunch, Dinner  Breakfast: 9a: greek yogurt with 2 slices of keto nut bread and keto jelly and natural PB, blueberries and 1 cup of milk OR eggs and vyas  Lunch: out to lunch or skips: 1/2 turkey sandwich on whole wheat, creamy veggie soup with iced tea- unsweetened OR may skip lunch  Dinner: 6-7p: salami with cashews OR sloppy joes on keto buns OR keto soup  Snacks: carb smart ice cream with peanuts and butterscotch topping  Other: couple sugar-free  Beverages: Water, Milk, Tea, Other (iced tea with artificial sweetener, sparkling ice)  Has patient met with a dietitian in the past?: Yes    Being Active:  Being Active Assessed Today: Yes  Exercise:: Yes  Days per week of moderate to strenuous exercise (like a brisk walk): 5  On average, minutes per day of exercise at this level: 10  How intense was your typical exercise? : Light (like stretching or slow walking)  Exercise Minutes per Week: 50    Monitoring:  Monitoring Assessed Today: Yes  Did patient bring glucose meter to appointment? : No  Blood Glucose Meter: Unknown  Times checking blood sugar at home (number): 1  Times checking blood sugar at home (per): Day    Glucose data:    Fasting range: 203-213 mg/dL the past few days    Post meal: 173 mg/dL last night      Taking Medications:  Diabetes Medication(s)     Biguanides       metFORMIN (GLUCOPHAGE XR) 500 MG 24 hr tablet    Take 1 tablet (500 mg) by mouth daily (with breakfast) AND 2 tablets (1,000 mg) daily (with dinner). New for diabetes          Taking Medication Assessed Today: Yes  Current Treatments: Oral Medication (taken by mouth)  Problems taking diabetes medications regularly?: No  Diabetes medication side effects?: No    Problem Solving:  Problem Solving Assessed Today: No  Is the patient at risk for hypoglycemia?: No              Reducing Risks:  Reducing Risks Assessed Today:  No  Diabetes Risks: Age over 45 years, Sedentary Lifestyle  Has dilated eye exam at least once a year?: Yes  Sees dentist every 6 months?: Yes  Feet checked by healthcare provider in the last year?: No    Healthy Coping:  Healthy Coping Assessed Today: Yes  Emotional response to diabetes: Ready to learn  Stage of change: ACTION (Actively working towards change)  Patient Activation Measure Survey Score:  No flowsheet data found.          Deanna Shah RDN, LD  Outpatient Diabetes Education  Adult Diabetes Education Triage [phone]    Time Spent: 19 minutes  Encounter Type: Individual        Any diabetes medication dose changes were made via the Certified Diabetes Care & Education Protocol in collaboration with the patient's referring provider. A copy of this encounter was shared with the provider.

## 2022-10-18 NOTE — LETTER
10/18/2022         RE: Rohit William  [address] [place] [facility] [place] [address]        Dear Colleague,    Thank you for referring your patient, Rohit William, to the [facility]ABET[facility]UC[facility] [place]. Please see a copy of my visit note below.    Diabetes Self-Management Education & Support    Presents for: Follow-up    Type of Visit: Telephone Visit: 19 min  Multiple unsuccessful phone call attempts made, was able to speak with Rohit at 11:20a    How would patient like to obtain AVS? MyChart    ASSESSMENT:    Telephone visit with Rohit today to review management of new Dx of DM type 2.  Is doing more walking after eating.  Eating smaller portion sizes, trying to eat less carbohydrates.  Is testing BG 1-2 times per day, usually a fasting and after dinner.  Wt has been stable recently.   Still thinking of joining [facility] for this winter.    Fasting BG are high, recommend increase Metformin dose.  Will work with PCP to order.      Patient's most recent   Lab Results   Component Value Date    A1C 12.9 08/29/2022    A1C 7.5 01/10/2019    is not meeting goal of <8.0    Diabetes knowledge and skills assessment:   Patient is knowledgeable in diabetes management concepts related to: Healthy Eating, Being Active, Monitoring and Taking Medication    Continue education with the following diabetes management concepts: Healthy Eating, Reducing Risks and Healthy Coping    Based on learning assessment above, most appropriate setting for further diabetes education would be: Individual setting.    INTERVENTIONS:    Education provided today on:  AADE Self-Care Behaviors:  Care Plan: Diabetes   Updates made by Greta Shah since 10/18/2022 12:00 AM      Problem: HbA1C Not In Goal       Goal: Get HbA1C Level in Goal       Task: Educate patient on benefits of regular glucose monitoring Completed 10/18/2022   Responsible User: Greta Shah      Problem: Diabetes Self-Management Education Needed to  Optimize Self-Care Behaviors       Goal: Being Active - get regular physical activity, working up to at least 150 minutes per week    This Visit's Progress: 20%   Recent Progress: 0%   Priority: High   Note:    My Goal: I will join [employer] with goal to increase physical activity    What I need to meet my goal: join and explore activities available    I plan to meet my goal by this date: 6 weeks      Goal: Monitoring - monitor glucose and ketones as directed    This Visit's Progress: On track   Recent Progress: 10%   Note:    My Goal: I will test BG at least once per day     What I need to meet my goal: meter, supplies and goals     I plan to meet my goal by this date: 3 months      Goal: Reducing Risks - know how to prevent and treat long-term diabetes complications       Task: Provide education on recommended care for dental, eye and foot health Completed 10/18/2022   Responsible User: Greta Shah      Task: Provide education on Hemoglobin A1c - goals and relationship to blood glucose levels Completed 10/18/2022   Responsible User: Greta Shah      Goal: Healthy Coping - use available resources to cope with the challenges of managing diabetes       Task: Provide education on the benefits of making appropriate lifestyle changes Completed 10/18/2022   Responsible User: Greta Shah          Opportunities for ongoing education and support in diabetes-self management were discussed. Pt verbalized understanding of concepts discussed and recommendations provided today.       Education Materials Provided:  No new materials provided today          PLAN    1) Continue to check blood sugar 1-2 times per day     2) Continue to increase physical activity and sign up for [employer]    3) Try to eat something mid-day and monitor blood sugar trends  Protein bar  Fruit and nuts  Trail mix    yogurt     Topics to cover at upcoming visits: Healthy Eating, Being Active, Reducing Risks and Healthy  "Coping  Follow-up: December 8th, 10:30p- telephone visit    See Goals Section for co-developed, patient-stated behavior change goals.  AVS provided to patient today.          SUBJECTIVE / OBJECTIVE:  Presents for: Follow-up  Accompanied by: Self  Diabetes education in the past 24mo: Yes  Focus of Visit: Monitoring, Taking Medication, Healthy Eating  Diabetes type: Type 2  Date of diagnosis: 2022  Disease course: Improving  Diabetes management related comments/concerns: whats the best time to check BG  Difficulty affording diabetes medication?: No  Difficulty affording diabetes testing supplies?: No  Cultural Influences/Ethnic Background:  Not  or       Diabetes Symptoms & Complications:  Fatigue: Sometimes  Neuropathy: Sometimes (finger tips on left hand)  Polydipsia: No  Polyphagia: No  Polyuria: No  Visual change: No  Slow healing wounds: No  Symptom course: Stable  Weight trend: Stable  Complications assessed today?: No    Patient Problem List and Family Medical History reviewed for relevant medical history, current medical status, and diabetes risk factors.    Vitals:  There were no vitals taken for this visit.  Estimated body mass index is 29.82 kg/m  as calculated from the following:    Height as of 8/29/22: 1.803 m (5' 11\").    Weight as of 8/29/22: 97 kg (213 lb 12.8 oz).   Last 3 BP:   BP Readings from Last 3 Encounters:   08/29/22 (!) 176/104   01/10/19 (!) 160/100       History   Smoking Status     Never   Smokeless Tobacco     Never       Labs:  Lab Results   Component Value Date    A1C 12.9 08/29/2022    A1C 7.5 01/10/2019     Lab Results   Component Value Date     08/29/2022     No results found for: LDL  No results found for: HDL]  GFR Estimate   Date Value Ref Range Status   08/29/2022 >90 >60 mL/min/1.73m2 Final     Comment:     Effective December 21, 2021 eGFRcr in adults is calculated using the 2021 CKD-EPI creatinine equation which includes age and gender (Adriane et al., NEJM, " DOI: 10.1056/KWEHhm8781535)     No results found for: GFRESTBLACK  Lab Results   Component Value Date    CR 0.63 08/29/2022     No results found for: MICROALBUMIN    Healthy Eating:  Healthy Eating Assessed Today: Yes  Meal planning/habits: Other (Keto)  How many times a week on average do you eat food made away from home (restaurant/take-out)?: 4  Meals include: Breakfast, Lunch, Dinner  Breakfast: 9a: greek yogurt with 2 slices of keto nut bread and keto jelly and natural PB, blueberries and 1 cup of milk OR eggs and vyas  Lunch: out to lunch or skips: 1/2 turkey sandwich on whole wheat, creamy veggie soup with iced tea- unsweetened OR may skip lunch  Dinner: 6-7p: salami with cashews OR sloppy joes on keto buns OR keto soup  Snacks: carb smart ice cream with peanuts and butterscotch topping  Other: couple sugar-free  Beverages: Water, Milk, Tea, Other (iced tea with artificial sweetener, sparkling ice)  Has patient met with a dietitian in the past?: Yes    Being Active:  Being Active Assessed Today: Yes  Exercise:: Yes  Days per week of moderate to strenuous exercise (like a brisk walk): 5  On average, minutes per day of exercise at this level: 10  How intense was your typical exercise? : Light (like stretching or slow walking)  Exercise Minutes per Week: 50    Monitoring:  Monitoring Assessed Today: Yes  Did patient bring glucose meter to appointment? : No  Blood Glucose Meter: Unknown  Times checking blood sugar at home (number): 1  Times checking blood sugar at home (per): Day    Glucose data:    Fasting range: 203-213 mg/dL the past few days    Post meal: 173 mg/dL last night      Taking Medications:  Diabetes Medication(s)     Biguanides       metFORMIN (GLUCOPHAGE XR) 500 MG 24 hr tablet    Take 1 tablet (500 mg) by mouth daily (with breakfast) AND 2 tablets (1,000 mg) daily (with dinner). New for diabetes          Taking Medication Assessed Today: Yes  Current Treatments: Oral Medication (taken by  mouth)  Problems taking diabetes medications regularly?: No  Diabetes medication side effects?: No    Problem Solving:  Problem Solving Assessed Today: No  Is the patient at risk for hypoglycemia?: No              Reducing Risks:  Reducing Risks Assessed Today: No  Diabetes Risks: Age over 45 years, Sedentary Lifestyle  Has dilated eye exam at least once a year?: Yes  Sees dentist every 6 months?: Yes  Feet checked by healthcare provider in the last year?: No    Healthy Coping:  Healthy Coping Assessed Today: Yes  Emotional response to diabetes: Ready to learn  Stage of change: ACTION (Actively working towards change)  Patient Activation Measure Survey Score:  No flowsheet data found.          Deanna Shah RDN, LD  Outpatient Diabetes Education  Adult Diabetes Education Triage [phone]    Time Spent: 19 minutes  Encounter Type: Individual        Any diabetes medication dose changes were made via the Certified Diabetes Care & Education Protocol in collaboration with the patient's referring provider. A copy of this encounter was shared with the provider.

## 2022-10-19 RX ORDER — METFORMIN HCL 500 MG
TABLET, EXTENDED RELEASE 24 HR ORAL
Qty: 360 TABLET | Refills: 0 | Status: SHIPPED | OUTPATIENT
Start: 2022-10-19 | End: 2022-12-01

## 2022-11-29 NOTE — PROGRESS NOTES
ASSESSMENT / PLAN:  (E11.69) Type 2 diabetes mellitus with other specified complication, unspecified whether long term insulin use (H)  (primary encounter diagnosis)  Comment: imrpoving  Plan: Lipid panel reflex to direct LDL Non-fasting,         Albumin Random Urine Quantitative with Creat         Ratio, HEMOGLOBIN A1C, Renal panel (Alb, BUN,         Ca, Cl, CO2, Creat, Gluc, Phos, K, Na),         metFORMIN (GLUCOPHAGE XR) 500 MG 24 hr tablet        Continue med/diet/ add weight lifting and 5 lbs weight loss. Recheck in 3 months  Add jardiance if not at goal.   Colonoscopy soon set-up.  Statin likely if ldl high. Reveiwed risks and side effects of medication  Expected course and warning signs reviewed.   Chest pain or shortness of breath to er.     (I10) Hypertension goal BP (blood pressure) < 140/90  Comment: a little high  Plan: losartan (COZAAR) 100 MG tablet        Will double dosage and continue self-monitor.         Lucas Bee is a 67 year old presenting for the following health issues:  Follow-up new onset dm and htn. Started on metformin and sent to dm ed.   [place]. exerciise some and watching diet.    No feet changes. Eye exam recently - no retanil damage. Colonoscopy next week. Emotionally doing ok.   No chest pain or shortness of breath.   Outside blood pressure readings high 130. No nausea, vomiting or diarrhea  No chief complaint on file.      History of Present Illness       Diabetes:   He presents for follow up of diabetes.  He is checking home blood glucose two times daily. He checks blood glucose before and after meals.  Blood glucose is sometimes over 200 and never under 70. When his blood glucose is low, the patient is asymptomatic for confusion, blurred vision, lethargy and reports not feeling dizzy, shaky, or weak.  He has no concerns regarding his diabetes at this time.  He is not experiencing numbness or burning in feet, excessive thirst, blurry vision, weight changes or  redness, sores or blisters on feet. The patient has had a diabetic eye exam in the last 12 months.         Hypertension: He presents for follow up of hypertension.  He does check blood pressure  regularly outside of the clinic. Outside blood pressures have been over 140/90. He does not follow a low salt diet.       Review of Systems         Objective    There were no vitals taken for this visit.  There is no height or weight on file to calculate BMI.   /81   Pulse 94   Temp 97.6  F (36.4  C) (Oral)   Resp 18   Wt 97.3 kg (214 lb 6.4 oz)   SpO2 97%   BMI 29.90 kg/m     Physical Exam   GENERAL: healthy, alert and no distress  EYES: Eyes grossly normal to inspection, PERRL and conjunctivae and sclerae normal  NECK: no adenopathy, no asymmetry, masses, or scars and thyroid normal to palpation  RESP: lungs clear to auscultation - no rales, rhonchi or wheezes  CV: regular rate and rhythm, normal S1 S2, no S3 or S4, no murmur, click or rub, no peripheral edema and peripheral pulses strong  ABDOMEN: soft, nontender, no hepatosplenomegaly, no masses and bowel sounds normal  MS: no gross musculoskeletal defects noted, no edema  NEURO: Normal strength and tone, mentation intact and speech normal  PSYCH: mentation appears normal, affect normal/bright

## 2022-11-30 RX ORDER — BISACODYL 5 MG
TABLET, DELAYED RELEASE (ENTERIC COATED) ORAL
Qty: 4 TABLET | Refills: 0 | Status: SHIPPED | OUTPATIENT
Start: 2022-11-30

## 2022-12-01 ENCOUNTER — OFFICE VISIT (OUTPATIENT)
Dept: FAMILY MEDICINE | Facility: CLINIC | Age: 67
End: 2022-12-01
Payer: COMMERCIAL

## 2022-12-01 VITALS
RESPIRATION RATE: 18 BRPM | HEART RATE: 94 BPM | TEMPERATURE: 97.6 F | SYSTOLIC BLOOD PRESSURE: 138 MMHG | WEIGHT: 214.4 LBS | DIASTOLIC BLOOD PRESSURE: 81 MMHG | OXYGEN SATURATION: 97 % | BODY MASS INDEX: 29.9 KG/M2

## 2022-12-01 DIAGNOSIS — I10 HYPERTENSION GOAL BP (BLOOD PRESSURE) < 140/90: ICD-10-CM

## 2022-12-01 DIAGNOSIS — E11.69 TYPE 2 DIABETES MELLITUS WITH OTHER SPECIFIED COMPLICATION, UNSPECIFIED WHETHER LONG TERM INSULIN USE (H): Primary | ICD-10-CM

## 2022-12-01 LAB
ALBUMIN SERPL-MCNC: 4 G/DL (ref 3.4–5)
ANION GAP SERPL CALCULATED.3IONS-SCNC: 10 MMOL/L (ref 3–14)
BUN SERPL-MCNC: 18 MG/DL (ref 7–30)
CALCIUM SERPL-MCNC: 9.1 MG/DL (ref 8.5–10.1)
CHLORIDE BLD-SCNC: 105 MMOL/L (ref 94–109)
CHOLEST SERPL-MCNC: 148 MG/DL
CO2 SERPL-SCNC: 23 MMOL/L (ref 20–32)
CREAT SERPL-MCNC: 0.67 MG/DL (ref 0.66–1.25)
CREAT UR-MCNC: 184 MG/DL
FASTING STATUS PATIENT QL REPORTED: YES
GFR SERPL CREATININE-BSD FRML MDRD: >90 ML/MIN/1.73M2
GLUCOSE BLD-MCNC: 201 MG/DL (ref 70–99)
HBA1C MFR BLD: 8.4 % (ref 0–5.6)
HDLC SERPL-MCNC: 30 MG/DL
LDLC SERPL CALC-MCNC: 80 MG/DL
MICROALBUMIN UR-MCNC: 56 MG/L
MICROALBUMIN/CREAT UR: 30.43 MG/G CR (ref 0–17)
NONHDLC SERPL-MCNC: 118 MG/DL
PHOSPHATE SERPL-MCNC: 3.9 MG/DL (ref 2.5–4.5)
POTASSIUM BLD-SCNC: 3.8 MMOL/L (ref 3.4–5.3)
SODIUM SERPL-SCNC: 138 MMOL/L (ref 133–144)
TRIGL SERPL-MCNC: 192 MG/DL

## 2022-12-01 PROCEDURE — 80069 RENAL FUNCTION PANEL: CPT | Performed by: FAMILY MEDICINE

## 2022-12-01 PROCEDURE — 80061 LIPID PANEL: CPT | Performed by: FAMILY MEDICINE

## 2022-12-01 PROCEDURE — 83036 HEMOGLOBIN GLYCOSYLATED A1C: CPT | Performed by: FAMILY MEDICINE

## 2022-12-01 PROCEDURE — 36415 COLL VENOUS BLD VENIPUNCTURE: CPT | Performed by: FAMILY MEDICINE

## 2022-12-01 PROCEDURE — 82043 UR ALBUMIN QUANTITATIVE: CPT | Performed by: FAMILY MEDICINE

## 2022-12-01 PROCEDURE — 99214 OFFICE O/P EST MOD 30 MIN: CPT | Performed by: FAMILY MEDICINE

## 2022-12-01 RX ORDER — LOSARTAN POTASSIUM 50 MG/1
50 TABLET ORAL DAILY
Qty: 30 TABLET | Refills: 3 | Status: CANCELLED | OUTPATIENT
Start: 2022-12-01

## 2022-12-01 RX ORDER — LOSARTAN POTASSIUM 100 MG/1
100 TABLET ORAL DAILY
Qty: 90 TABLET | Refills: 1 | Status: SHIPPED | OUTPATIENT
Start: 2022-12-01 | End: 2023-05-22

## 2022-12-01 RX ORDER — METFORMIN HCL 500 MG
TABLET, EXTENDED RELEASE 24 HR ORAL
Qty: 360 TABLET | Refills: 0 | Status: SHIPPED | OUTPATIENT
Start: 2022-12-01 | End: 2023-05-22

## 2022-12-01 ASSESSMENT — PAIN SCALES - GENERAL: PAINLEVEL: NO PAIN (0)

## 2022-12-07 ENCOUNTER — HOSPITAL ENCOUNTER (OUTPATIENT)
Facility: AMBULATORY SURGERY CENTER | Age: 67
Discharge: HOME | End: 2022-12-07
Attending: INTERNAL MEDICINE | Admitting: INTERNAL MEDICINE
Payer: COMMERCIAL

## 2022-12-07 VITALS
TEMPERATURE: 97.4 F | DIASTOLIC BLOOD PRESSURE: 98 MMHG | RESPIRATION RATE: 16 BRPM | OXYGEN SATURATION: 96 % | HEART RATE: 81 BPM | SYSTOLIC BLOOD PRESSURE: 155 MMHG

## 2022-12-07 DIAGNOSIS — Z12.11 SPECIAL SCREENING FOR MALIGNANT NEOPLASMS, COLON: Primary | ICD-10-CM

## 2022-12-07 LAB
COLONOSCOPY: NORMAL
GLUCOSE BLDC GLUCOMTR-MCNC: 207 MG/DL (ref 70–99)

## 2022-12-07 PROCEDURE — 88305 TISSUE EXAM BY PATHOLOGIST: CPT | Performed by: PATHOLOGY

## 2022-12-07 PROCEDURE — G8918 PT W/O PREOP ORDER IV AB PRO: HCPCS

## 2022-12-07 PROCEDURE — 45385 COLONOSCOPY W/LESION REMOVAL: CPT | Mod: PT

## 2022-12-07 PROCEDURE — G8907 PT DOC NO EVENTS ON DISCHARG: HCPCS

## 2022-12-07 RX ORDER — LIDOCAINE 40 MG/G
CREAM TOPICAL
Status: DISCONTINUED | OUTPATIENT
Start: 2022-12-07 | End: 2022-12-08 | Stop reason: HOSPADM

## 2022-12-07 RX ORDER — ONDANSETRON 2 MG/ML
4 INJECTION INTRAMUSCULAR; INTRAVENOUS EVERY 6 HOURS PRN
Status: DISCONTINUED | OUTPATIENT
Start: 2022-12-07 | End: 2022-12-08 | Stop reason: HOSPADM

## 2022-12-07 RX ORDER — NALOXONE HYDROCHLORIDE 0.4 MG/ML
0.2 INJECTION, SOLUTION INTRAMUSCULAR; INTRAVENOUS; SUBCUTANEOUS
Status: DISCONTINUED | OUTPATIENT
Start: 2022-12-07 | End: 2022-12-08 | Stop reason: HOSPADM

## 2022-12-07 RX ORDER — ONDANSETRON 2 MG/ML
4 INJECTION INTRAMUSCULAR; INTRAVENOUS
Status: DISCONTINUED | OUTPATIENT
Start: 2022-12-07 | End: 2022-12-08 | Stop reason: HOSPADM

## 2022-12-07 RX ORDER — PROCHLORPERAZINE MALEATE 5 MG
5 TABLET ORAL EVERY 6 HOURS PRN
Status: DISCONTINUED | OUTPATIENT
Start: 2022-12-07 | End: 2022-12-08 | Stop reason: HOSPADM

## 2022-12-07 RX ORDER — NALOXONE HYDROCHLORIDE 0.4 MG/ML
0.4 INJECTION, SOLUTION INTRAMUSCULAR; INTRAVENOUS; SUBCUTANEOUS
Status: DISCONTINUED | OUTPATIENT
Start: 2022-12-07 | End: 2022-12-08 | Stop reason: HOSPADM

## 2022-12-07 RX ORDER — FENTANYL CITRATE 50 UG/ML
INJECTION, SOLUTION INTRAMUSCULAR; INTRAVENOUS PRN
Status: DISCONTINUED | OUTPATIENT
Start: 2022-12-07 | End: 2022-12-07 | Stop reason: HOSPADM

## 2022-12-07 RX ORDER — ONDANSETRON 4 MG/1
4 TABLET, ORALLY DISINTEGRATING ORAL EVERY 6 HOURS PRN
Status: DISCONTINUED | OUTPATIENT
Start: 2022-12-07 | End: 2022-12-08 | Stop reason: HOSPADM

## 2022-12-07 RX ORDER — FLUMAZENIL 0.1 MG/ML
0.2 INJECTION, SOLUTION INTRAVENOUS
Status: ACTIVE | OUTPATIENT
Start: 2022-12-07 | End: 2022-12-07

## 2022-12-07 NOTE — H&P
ENDOSCOPY PRE-SEDATION H&P FOR OUTPATIENT PROCEDURES    Rohit William  [id number]  1955    Procedure: colonoscopy    Pre-procedure diagnosis: screening    Past medical history: History reviewed. No pertinent past medical history.    Past surgical history: History reviewed. No pertinent surgical history.    Current Outpatient Medications   Medication     bisacodyl (DULCOLAX) 5 MG EC tablet     losartan (COZAAR) 100 MG tablet     losartan (COZAAR) 50 MG tablet     metFORMIN (GLUCOPHAGE XR) 500 MG 24 hr tablet     polyethylene glycol (GOLYTELY) 236 g suspension     aspirin (ASA) 81 MG tablet     blood glucose (NO BRAND SPECIFIED) lancets standard     blood glucose (NO BRAND SPECIFIED) test strip     blood glucose monitoring (ONE TOUCH ULTRA 2) meter device kit     Current Facility-Administered Medications   Medication     fentaNYL (PF) (SUBLIMAZE) injection     lidocaine (LMX4) kit     lidocaine 1 % 0.1-1 mL     midazolam (VERSED) injection     ondansetron (ZOFRAN) injection 4 mg     sodium chloride (PF) 0.9% PF flush 3 mL     sodium chloride (PF) 0.9% PF flush 3 mL     sodium chloride (PF) 0.9% PF flush       No Known Allergies    History of Anesthesia/Sedation Problems: no    Physical Exam:    Mental status: alert  Heart: Normal  Lung: Normal  Assessment of patient's airway: Normal  Other as pertinent for procedure: None     ASA Score: See Provation note    Mallampati score:  II - Faucial pillars and soft palate may be seen, but uvula is masked by the base of the tongue    Assessment/Plan:     The patient is an appropriate candidate to receive sedation.    Informed consent was discussed with the patient/family, including the risks, benefits, potential complications and any alternative options associated with sedation.    Patient assessment completed just prior to sedation and while under constant observation by the provider. Condition determined to be adequate for proceeding with sedation.    The specific risks  for the procedure were discussed with the patient at the time of informed consent and include but are not limited to perforation which could require surgery, missing significant neoplasm or lesion, hemorrhage and adverse sedative complication.      Gomez Zurita MD

## 2022-12-08 ENCOUNTER — VIRTUAL VISIT (OUTPATIENT)
Dept: EDUCATION SERVICES | Facility: CLINIC | Age: 67
End: 2022-12-08
Payer: COMMERCIAL

## 2022-12-08 DIAGNOSIS — E11.9 DIABETES MELLITUS, TYPE 2 (H): Primary | ICD-10-CM

## 2022-12-08 PROCEDURE — 98968 PH1 ASSMT&MGMT NQHP 21-30: CPT | Mod: 95

## 2022-12-08 NOTE — LETTER
12/8/2022         RE: Rohit William  [address] [facility] [address]        Dear Colleague,    Thank you for referring your patient, Rohit William, to the [facility] DIABETES EDUCATION [place]. Please see a copy of my visit note below.    Diabetes Self-Management Education & Support    Presents for: Follow-up    Type of Visit: Telephone Visit   Provider location: Home  Patient location: Home  Call time: 10:34-10:56am (22 minutes)    How would patient like to obtain AVS? MyChart    ASSESSMENT:    Telephone visit with Rohit to review management of diabetes.  He is doing well with diet changes and has been active the past month on vacation with his family.  Has started to drink apple cider vinegar daily, has noticed this seems to lower BG- 2 T mixed in a bottle of water.    Plans to join [employer] to increase activity this winter.  Continues to test BG daily, BG are trending down.  Recent A1C improved as well.  No changes to medication regimen today.      Patient's most recent   Lab Results   Component Value Date    A1C 8.4 12/01/2022    A1C 7.5 01/10/2019    is not meeting goal of <8.0    Diabetes knowledge and skills assessment:   Patient is knowledgeable in diabetes management concepts related to: Healthy Eating, Monitoring, Taking Medication, Reducing Risks and Healthy Coping    Continue education with the following diabetes management concepts: Being Active, Taking Medication, Reducing Risks and Healthy Coping    Based on learning assessment above, most appropriate setting for further diabetes education would be: Individual setting.    INTERVENTIONS:    Education provided today on:  AADE Self-Care Behaviors:  Care Plan: Diabetes   Updates made by Greta Shah since 12/8/2022 12:00 AM      Problem: HbA1C Not In Goal       Goal: Get HbA1C Level in Goal       Task: Educate patient on diabetes education self-management topics Completed 12/8/2022   Responsible User: Greta Shah       Task: Discuss diabetes treatment plan with patient Completed 12/8/2022   Responsible User: Greta Shah      Problem: Diabetes Self-Management Education Needed to Optimize Self-Care Behaviors       Goal: Being Active - get regular physical activity, working up to at least 150 minutes per week    This Visit's Progress: 0%   Recent Progress: 20%   Priority: High   Note:    My Goal: I will join [employer] with goal to increase physical activity    What I need to meet my goal: join and explore activities available    I plan to meet my goal by this date: 3 months      Goal: Monitoring - monitor glucose and ketones as directed Completed 12/8/2022   Recent Progress: On track   Note:    My Goal: I will test BG at least once per day     What I need to meet my goal: meter, supplies and goals     I plan to meet my goal by this date: 3 months      Task: Provide education on continuous glucose monitoring (sensor placement, use of abdi or /reader, understanding glucose trends, alerts and alarms, differences between sensor glucose and blood glucose) Completed 12/8/2022   Responsible User: Greta Shah      Task: Provide education on ketone monitoring (when to monitor, frequency, etc.) Completed 12/8/2022   Responsible User: Greta Shah      Goal: Taking Medication - patient is consistently taking medications as directed       Task: Provide education on frequency and refill details of medications Completed 12/8/2022   Responsible User: Greta Shah      Goal: Problem Solving - know how to prevent and manage short-term diabetes complications       Task: Provide education on when to call a health care provider Completed 12/8/2022   Responsible User: Greta Shah      Goal: Reducing Risks - know how to prevent and treat long-term diabetes complications       Task: Provide education on major complications of diabetes, prevention, early diagnostic measures and treatment  of complications Completed 12/8/2022   Responsible User: Greta Shah      Goal: Healthy Coping - use available resources to cope with the challenges of managing diabetes       Task: Provide education on benefits of utilizing support systems Completed 12/8/2022   Responsible User: Greta Shah          Opportunities for ongoing education and support in diabetes-self management were discussed. Pt verbalized understanding of concepts discussed and recommendations provided today.       Education Materials Provided:  No new materials provided today          PLAN    1) Continue to check blood sugar 1-2 times per day   Fasting/morning goal:  mg/dL  2 Hours after the start of a meal: Less than 180 mg/dL    2) 1 serving of carbohydrate = 15 grams  Aim for 4 servings or 60 grams per meal and 15-20 grams of carbs per snack + a serving of protein    3) Increase physical activity- joining the [facility] is a great plan.    Topics to cover at upcoming visits: Healthy Eating and Being Active  Follow-up: March 9th, 10:30am- telephone visit    See Goals Section for co-developed, patient-stated behavior change goals.  AVS provided to patient today.          SUBJECTIVE / OBJECTIVE:  Presents for: Follow-up  Accompanied by: Self  Diabetes education in the past 24mo: Yes  Focus of Visit: Monitoring, Taking Medication, Healthy Eating  Diabetes type: Type 2  Date of diagnosis: 2022  Disease course: Improving  Diabetes management related comments/concerns: whats the best time to check BG  Difficulty affording diabetes medication?: No  Difficulty affording diabetes testing supplies?: No  Cultural Influences/Ethnic Background:  Not  or       Diabetes Symptoms & Complications:  Fatigue: Sometimes  Neuropathy: Sometimes (finger tips on left hand)  Polydipsia: No  Polyphagia: No  Polyuria: No  Visual change: No  Slow healing wounds: No  Symptom course: Improving  Weight trend: Decreasing  Complications  "assessed today?: No    Patient Problem List and Family Medical History reviewed for relevant medical history, current medical status, and diabetes risk factors.    Vitals:  There were no vitals taken for this visit.  Estimated body mass index is 29.9 kg/m  as calculated from the following:    Height as of 8/29/22: 1.803 m (5' 11\").    Weight as of 12/1/22: 97.3 kg (214 lb 6.4 oz).   Last 3 BP:   BP Readings from Last 3 Encounters:   12/07/22 (!) 155/98   12/01/22 138/81   08/29/22 (!) 176/104       History   Smoking Status     Never   Smokeless Tobacco     Never       Labs:  Lab Results   Component Value Date    A1C 8.4 12/01/2022    A1C 7.5 01/10/2019     Lab Results   Component Value Date     12/07/2022     12/01/2022     Lab Results   Component Value Date    LDL 80 12/01/2022     Direct Measure HDL   Date Value Ref Range Status   12/01/2022 30 (L) >=40 mg/dL Final   ]  GFR Estimate   Date Value Ref Range Status   12/01/2022 >90 >60 mL/min/1.73m2 Final     Comment:     Effective December 21, 2021 eGFRcr in adults is calculated using the 2021 CKD-EPI creatinine equation which includes age and gender (Adriane et al., [place], DOI: 10.1056/GQCEbu0891332)     No results found for: GFRESTBLACK  Lab Results   Component Value Date    CR 0.67 12/01/2022     No results found for: MICROALBUMIN    Healthy Eating:  Healthy Eating Assessed Today: Yes  Meal planning/habits: Low carb (Keto)  How many times a week on average do you eat food made away from home (restaurant/take-out)?: 4  Meals include: Breakfast, Lunch, Dinner  Breakfast: 9a: greek yogurt with 2 slices of keto nut bread and keto jelly and natural PB, blueberries and 1 cup of milk OR eggs and vyas- sugar free jelly if toast  Lunch: out to lunch or skips: 1/2 turkey sandwich on whole wheat, creamy veggie soup with iced tea- unsweetened OR may skip lunch OR protein bar and apple  Dinner: 6-7p: salami with cashews OR sloppy joes on keto buns OR keto soup OR " egg salad sandwich OR keto chicken breast with parmasean sauce  Snacks: carb smart ice cream with peanuts and butterscotch topping  Other: couple sugar-free  Beverages: Water, Milk, Tea, Other (iced tea with artificial sweetener, sparkling ice)  Has patient met with a dietitian in the past?: Yes    Being Active:  Being Active Assessed Today: Yes  Exercise:: Yes  Days per week of moderate to strenuous exercise (like a brisk walk): 5  On average, minutes per day of exercise at this level: 10  How intense was your typical exercise? : Light (like stretching or slow walking)  Exercise Minutes per Week: 50    Monitoring:  Monitoring Assessed Today: Yes  Did patient bring glucose meter to appointment? : No  Blood Glucose Meter: Unknown  Times checking blood sugar at home (number): 1  Times checking blood sugar at home (per): Day    Glucose data:  Pt reports  Fasting BG: 160-190 mg/dL   After dinner BG: 160 mg/dL has had as low as 104 mg/dL       Taking Medications:  Diabetes Medication(s)     Biguanides       metFORMIN (GLUCOPHAGE XR) 500 MG 24 hr tablet    Take 2 tablets (1,000 mg) by mouth daily (with breakfast) AND 2 tablets (1,000 mg) daily (with dinner). New for diabetes          Taking Medication Assessed Today: Yes  Current Treatments: Oral Medication (taken by mouth)  Problems taking diabetes medications regularly?: No  Diabetes medication side effects?: No    Problem Solving:  Problem Solving Assessed Today: No  Is the patient at risk for hypoglycemia?: No              Reducing Risks:  Reducing Risks Assessed Today: No  Diabetes Risks: Age over 45 years, Sedentary Lifestyle  Has dilated eye exam at least once a year?: Yes  Sees dentist every 6 months?: Yes  Feet checked by healthcare provider in the last year?: No    Healthy Coping:  Healthy Coping Assessed Today: Yes  Emotional response to diabetes: Ready to learn  Informal Support system:: Children, Spouse  Stage of change: ACTION (Actively working towards  change)  Patient Activation Measure Survey Score:  No flowsheet data found.          Deanna Shah RDN, LD  Outpatient Diabetes Education  Adult Diabetes Education Triage [phone]    Time Spent: 22 minutes  Encounter Type: Individual        Any diabetes medication dose changes were made via the Certified Diabetes Care & Education Protocol in collaboration with the patient's referring provider. A copy of this encounter was shared with the provider.

## 2022-12-08 NOTE — PROGRESS NOTES
Diabetes Self-Management Education & Support    Presents for: Follow-up    Type of Visit: Telephone Visit   Provider location: Home  Patient location: Home  Call time: 10:34-10:56am (22 minutes)    How would patient like to obtain AVS? Neela    ASSESSMENT:    Telephone visit with Rohit to review management of diabetes.  He is doing well with diet changes and has been active the past month on vacation with his family.  Has started to drink apple cider vinegar daily, has noticed this seems to lower BG- 2 T mixed in a bottle of water.    Plans to join [employer] to increase activity this winter.  Continues to test BG daily, BG are trending down.  Recent A1C improved as well.  No changes to medication regimen today.      Patient's most recent   Lab Results   Component Value Date    A1C 8.4 12/01/2022    A1C 7.5 01/10/2019    is not meeting goal of <8.0    Diabetes knowledge and skills assessment:   Patient is knowledgeable in diabetes management concepts related to: Healthy Eating, Monitoring, Taking Medication, Reducing Risks and Healthy Coping    Continue education with the following diabetes management concepts: Being Active, Taking Medication, Reducing Risks and Healthy Coping    Based on learning assessment above, most appropriate setting for further diabetes education would be: Individual setting.    INTERVENTIONS:    Education provided today on:  AADE Self-Care Behaviors:  Care Plan: Diabetes   Updates made by Greta Shah since 12/8/2022 12:00 AM      Problem: HbA1C Not In Goal       Goal: Get HbA1C Level in Goal       Task: Educate patient on diabetes education self-management topics Completed 12/8/2022   Responsible User: Greta Shah      Task: Discuss diabetes treatment plan with patient Completed 12/8/2022   Responsible User: Greta Shah      Problem: Diabetes Self-Management Education Needed to Optimize Self-Care Behaviors       Goal: Being Active - get regular physical  activity, working up to at least 150 minutes per week    This Visit's Progress: 0%   Recent Progress: 20%   Priority: High   Note:    My Goal: I will join [employer] with goal to increase physical activity    What I need to meet my goal: join and explore activities available    I plan to meet my goal by this date: 3 months      Goal: Monitoring - monitor glucose and ketones as directed Completed 12/8/2022   Recent Progress: On track   Note:    My Goal: I will test BG at least once per day     What I need to meet my goal: meter, supplies and goals     I plan to meet my goal by this date: 3 months      Task: Provide education on continuous glucose monitoring (sensor placement, use of abdi or /reader, understanding glucose trends, alerts and alarms, differences between sensor glucose and blood glucose) Completed 12/8/2022   Responsible User: Greta Shah      Task: Provide education on ketone monitoring (when to monitor, frequency, etc.) Completed 12/8/2022   Responsible User: Greta Shah      Goal: Taking Medication - patient is consistently taking medications as directed       Task: Provide education on frequency and refill details of medications Completed 12/8/2022   Responsible User: Greta Shah      Goal: Problem Solving - know how to prevent and manage short-term diabetes complications       Task: Provide education on when to call a health care provider Completed 12/8/2022   Responsible User: Greta Shah      Goal: Reducing Risks - know how to prevent and treat long-term diabetes complications       Task: Provide education on major complications of diabetes, prevention, early diagnostic measures and treatment of complications Completed 12/8/2022   Responsible User: Greta Shah      Goal: Healthy Coping - use available resources to cope with the challenges of managing diabetes       Task: Provide education on benefits of utilizing support systems  Completed 12/8/2022   Responsible User: Greta Shah          Opportunities for ongoing education and support in diabetes-self management were discussed. Pt verbalized understanding of concepts discussed and recommendations provided today.       Education Materials Provided:  No new materials provided today          PLAN    1) Continue to check blood sugar 1-2 times per day   Fasting/morning goal:  mg/dL  2 Hours after the start of a meal: Less than 180 mg/dL    2) 1 serving of carbohydrate = 15 grams  Aim for 4 servings or 60 grams per meal and 15-20 grams of carbs per snack + a serving of protein    3) Increase physical activity- joining the [employer] is a great plan.    Topics to cover at upcoming visits: Healthy Eating and Being Active  Follow-up: March 9th, 10:30am- telephone visit    See Goals Section for co-developed, patient-stated behavior change goals.  AVS provided to patient today.          SUBJECTIVE / OBJECTIVE:  Presents for: Follow-up  Accompanied by: Self  Diabetes education in the past 24mo: Yes  Focus of Visit: Monitoring, Taking Medication, Healthy Eating  Diabetes type: Type 2  Date of diagnosis: 2022  Disease course: Improving  Diabetes management related comments/concerns: whats the best time to check BG  Difficulty affording diabetes medication?: No  Difficulty affording diabetes testing supplies?: No  Cultural Influences/Ethnic Background:  Not  or       Diabetes Symptoms & Complications:  Fatigue: Sometimes  Neuropathy: Sometimes (finger tips on left hand)  Polydipsia: No  Polyphagia: No  Polyuria: No  Visual change: No  Slow healing wounds: No  Symptom course: Improving  Weight trend: Decreasing  Complications assessed today?: No    Patient Problem List and Family Medical History reviewed for relevant medical history, current medical status, and diabetes risk factors.    Vitals:  There were no vitals taken for this visit.  Estimated body mass index is 29.9 kg/m   "as calculated from the following:    Height as of 8/29/22: 1.803 m (5' 11\").    Weight as of 12/1/22: 97.3 kg (214 lb 6.4 oz).   Last 3 BP:   BP Readings from Last 3 Encounters:   12/07/22 (!) 155/98   12/01/22 138/81   08/29/22 (!) 176/104       History   Smoking Status     Never   Smokeless Tobacco     Never       Labs:  Lab Results   Component Value Date    A1C 8.4 12/01/2022    A1C 7.5 01/10/2019     Lab Results   Component Value Date     12/07/2022     12/01/2022     Lab Results   Component Value Date    LDL 80 12/01/2022     Direct Measure HDL   Date Value Ref Range Status   12/01/2022 30 (L) >=40 mg/dL Final   ]  GFR Estimate   Date Value Ref Range Status   12/01/2022 >90 >60 mL/min/1.73m2 Final     Comment:     Effective December 21, 2021 eGFRcr in adults is calculated using the 2021 CKD-EPI creatinine equation which includes age and gender (Adriane goetz al., NEJ, DOI: 10.1056/[id number])     No results found for: GFRESTBLACK  Lab Results   Component Value Date    CR 0.67 12/01/2022     No results found for: MICROALBUMIN    Healthy Eating:  Healthy Eating Assessed Today: Yes  Meal planning/habits: Low carb (Keto)  How many times a week on average do you eat food made away from home (restaurant/take-out)?: 4  Meals include: Breakfast, Lunch, Dinner  Breakfast: 9a: greek yogurt with 2 slices of keto nut bread and keto jelly and natural PB, blueberries and 1 cup of milk OR eggs and vyas- sugar free jelly if toast  Lunch: out to lunch or skips: 1/2 turkey sandwich on whole wheat, creamy veggie soup with iced tea- unsweetened OR may skip lunch OR protein bar and apple  Dinner: 6-7p: salami with cashews OR sloppy joes on keto buns OR keto soup OR egg salad sandwich OR keto chicken breast with parmasean sauce  Snacks: carb smart ice cream with peanuts and butterscotch topping  Other: couple sugar-free  Beverages: Water, Milk, Tea, Other (iced tea with artificial sweetener, sparkling ice)  Has patient " met with a dietitian in the past?: Yes    Being Active:  Being Active Assessed Today: Yes  Exercise:: Yes  Days per week of moderate to strenuous exercise (like a brisk walk): 5  On average, minutes per day of exercise at this level: 10  How intense was your typical exercise? : Light (like stretching or slow walking)  Exercise Minutes per Week: 50    Monitoring:  Monitoring Assessed Today: Yes  Did patient bring glucose meter to appointment? : No  Blood Glucose Meter: Unknown  Times checking blood sugar at home (number): 1  Times checking blood sugar at home (per): Day    Glucose data:  Pt reports  Fasting BG: 160-190 mg/dL   After dinner BG: 160 mg/dL has had as low as 104 mg/dL       Taking Medications:  Diabetes Medication(s)     Biguanides       metFORMIN (GLUCOPHAGE XR) 500 MG 24 hr tablet    Take 2 tablets (1,000 mg) by mouth daily (with breakfast) AND 2 tablets (1,000 mg) daily (with dinner). New for diabetes          Taking Medication Assessed Today: Yes  Current Treatments: Oral Medication (taken by mouth)  Problems taking diabetes medications regularly?: No  Diabetes medication side effects?: No    Problem Solving:  Problem Solving Assessed Today: No  Is the patient at risk for hypoglycemia?: No              Reducing Risks:  Reducing Risks Assessed Today: No  Diabetes Risks: Age over 45 years, Sedentary Lifestyle  Has dilated eye exam at least once a year?: Yes  Sees dentist every 6 months?: Yes  Feet checked by healthcare provider in the last year?: No    Healthy Coping:  Healthy Coping Assessed Today: Yes  Emotional response to diabetes: Ready to learn  Informal Support system:: Children, Spouse  Stage of change: ACTION (Actively working towards change)  Patient Activation Measure Survey Score:  No flowsheet data found.          Deanna Shah RDN, LD  Outpatient Diabetes Education  Adult Diabetes Education Triage [phone]    Time Spent: 22 minutes  Encounter Type: Individual        Any  diabetes medication dose changes were made via the Certified Diabetes Care & Education Protocol in collaboration with the patient's referring provider. A copy of this encounter was shared with the provider.

## 2022-12-08 NOTE — PATIENT INSTRUCTIONS
1) Continue to check blood sugar 1-2 times per day   Fasting/morning goal:  mg/dL  2 Hours after the start of a meal: Less than 180 mg/dL    2) 1 serving of carbohydrate = 15 grams  Aim for 4 servings or 60 grams per meal and 15-20 grams of carbs per snack + a serving of protein    3) Increase physical activity- joining the [facility] is a great plan.

## 2022-12-09 LAB
PATH REPORT.COMMENTS IMP SPEC: NORMAL
PATH REPORT.COMMENTS IMP SPEC: NORMAL
PATH REPORT.FINAL DX SPEC: NORMAL
PATH REPORT.GROSS SPEC: NORMAL
PATH REPORT.MICROSCOPIC SPEC OTHER STN: NORMAL
PATH REPORT.RELEVANT HX SPEC: NORMAL
PHOTO IMAGE: NORMAL

## 2023-02-24 ENCOUNTER — DOCUMENTATION ONLY (OUTPATIENT)
Dept: LAB | Facility: CLINIC | Age: 68
End: 2023-02-24
Payer: COMMERCIAL

## 2023-02-24 DIAGNOSIS — E11.9 TYPE 2 DIABETES MELLITUS WITHOUT COMPLICATION, UNSPECIFIED WHETHER LONG TERM INSULIN USE (H): Primary | ICD-10-CM

## 2023-02-24 NOTE — PROGRESS NOTES
Rohit William has an upcoming lab appointment:    Future Appointments   Date Time Provider Department Center   3/1/2023  9:00 AM [facility]   3/9/2023 10:30 AM Greta Shah [facility]         There is no order available. Please review and place either future orders or HMPO (Review of Health Maintenance Protocol Orders), as appropriate.    Health Maintenance Due   Topic     ANNUAL REVIEW OF HM ORDERS      HEPATITIS C SCREENING      A1C      Balwinder POLKT

## 2023-03-01 ENCOUNTER — LAB (OUTPATIENT)
Dept: LAB | Facility: CLINIC | Age: 68
End: 2023-03-01
Payer: COMMERCIAL

## 2023-03-01 DIAGNOSIS — E11.9 TYPE 2 DIABETES MELLITUS WITHOUT COMPLICATION, UNSPECIFIED WHETHER LONG TERM INSULIN USE (H): ICD-10-CM

## 2023-03-01 LAB — HBA1C MFR BLD: 6.7 % (ref 0–5.6)

## 2023-03-01 PROCEDURE — 83036 HEMOGLOBIN GLYCOSYLATED A1C: CPT

## 2023-03-01 PROCEDURE — 36415 COLL VENOUS BLD VENIPUNCTURE: CPT

## 2023-03-05 ENCOUNTER — TRANSFERRED RECORDS (OUTPATIENT)
Dept: MULTI SPECIALTY CLINIC | Facility: CLINIC | Age: 68
End: 2023-03-05

## 2023-03-05 LAB — RETINOPATHY: NORMAL

## 2023-03-09 ENCOUNTER — VIRTUAL VISIT (OUTPATIENT)
Dept: EDUCATION SERVICES | Facility: CLINIC | Age: 68
End: 2023-03-09
Payer: COMMERCIAL

## 2023-03-09 DIAGNOSIS — E11.9 DIABETES MELLITUS, TYPE 2 (H): Primary | ICD-10-CM

## 2023-03-09 PROCEDURE — 99207 PR DROP WITH A PROCEDURE: CPT | Mod: 25

## 2023-03-09 NOTE — PROGRESS NOTES
Telephone visit with Rohit today to review management of diabetes.  He says he is in [place] currently.  Unable to continue with the visit if patient is out of the state of [place].  Rescheduled for March 30th when he will be back in [place].     Deanna Shah RDN, LD, [facility]  Outpatient Diabetes Education  Adult Diabetes Education Triage [phone]

## 2023-03-09 NOTE — LETTER
3/9/2023         RE: Rohit William  [address] [facility] [address]        Dear Colleague,    Thank you for referring your patient, Rohit William, to the [facility] DIABETES EDUCATION [place]. Please see a copy of my visit note below.    Telephone visit with Rohit today to review management of diabetes.  He says he is in [place] currently.  Unable to continue with the visit if patient is out of the state of [place].  Rescheduled for March 30th when he will be back in [place].     Deanna Shah, SUNNI, LD, [facility]  Outpatient Diabetes Education  Adult Diabetes Education Triage [phone]

## 2023-03-30 ENCOUNTER — TELEPHONE (OUTPATIENT)
Dept: EDUCATION SERVICES | Facility: CLINIC | Age: 68
End: 2023-03-30

## 2023-03-30 ENCOUNTER — VIRTUAL VISIT (OUTPATIENT)
Dept: EDUCATION SERVICES | Facility: CLINIC | Age: 68
End: 2023-03-30
Payer: COMMERCIAL

## 2023-03-30 DIAGNOSIS — E11.9 DIABETES MELLITUS, TYPE 2 (H): Primary | ICD-10-CM

## 2023-03-30 PROCEDURE — 98967 PH1 ASSMT&MGMT NQHP 11-20: CPT

## 2023-03-30 NOTE — PATIENT INSTRUCTIONS
Medications to ask insurance about:  SGLT-2: Farxiga, Jardiance, Invokana    DPP-4: Januvia, Tradjenta    Sulfonylurea: Glipizide or Glimepiride     1) Continue to find ways to be active inside until weather improves    2) Great work with diet.  Continue to find balance with carbohydrate intake- needed for fuel, just important to use portion control.  Following an eating pattern can also help maintain steady blood sugars    3) Keep testing blood sugar daily so to help you stay on track

## 2023-03-30 NOTE — PROGRESS NOTES
Diabetes Self-Management Education & Support    Presents for: Follow-up    Type of Service: Telephone Visit    Originating Location (Patient Location): Home  Distant Location (Provider Location):  [facility] [place]  Mode of Communication:  Telephone    Telephone Visit Start Time: 9:30am  Telephone Visit End Time (telephone visit stop time): 9:50am    How would patient like to obtain AVS? Neela    Assessment Type:   ASSESSMENT:  Telephone follow up with Rohit today to review management of newer Dx DM type 2.  He has been able to lower his A1C from 12.9% to 6.7%.  Encouraged his efforts with this.  He is doing well with consistent diet.  Says his wife helps with this and reads all labels for carbohydrate content.    He continues to report Metformin bothering his stomach.  Reports nausea and stomach pains.  Has tried reducing dose and taking less at a time.  Takes with food.  Variations have not helped with tolerance.  He also says when he reduced dose, he noticed his BG increased.  Reviewed some alternative medication options.  Will also communicate with PCP regarding medication.  Rohit plans to make appointment with PCP in April.  Is testing fasting BG daily, average  mg/dL.    Was active when he was on vacation in [place]; swimming, golf, walking.  Less active since coming back to [place] due to the weather and icy conditions.   Has played pickle ball with his son a few times lately.    Patient's most recent   Lab Results   Component Value Date    A1C 6.7 03/01/2023    A1C 7.5 01/10/2019     is meeting goal of <7.0    Diabetes knowledge and skills assessment:   Patient is knowledgeable in diabetes management concepts related to: Healthy Eating, Being Active, Monitoring, Taking Medication, Reducing Risks and Healthy Coping    Continue education with the following diabetes management concepts: Healthy Eating, Taking Medication and Reducing Risks    Based on learning assessment above,  most appropriate setting for further diabetes education would be: Individual setting.      PLAN    Medications to ask insurance about:  SGLT-2: Farxiga, Jardiance, Invokana    DPP-4: Januvia, Tradjenta    Sulfonylurea: Glipizide or Glimepiride     1) Continue to find ways to be active inside until weather improves    2) Great work with diet.  Continue to find balance with carbohydrate intake- needed for fuel, just important to use portion control.  Following an eating pattern can also help maintain steady blood sugars    3) Keep testing blood sugar daily so to help you stay on track    Topics to cover at upcoming visits: Reducing Risks    Follow-up: To be scheduled as needed    See Care Plan for co-developed, patient-state behavior change goals.  AVS provided for patient today.    Education Materials Provided:  No new materials provided today      SUBJECTIVE/OBJECTIVE:  Presents for: Follow-up  Accompanied by: Self  Diabetes education in the past 24mo: Yes  Focus of Visit: Monitoring, Taking Medication, Healthy Eating  Diabetes type: Type 2  Date of diagnosis: 2022  Disease course: Improving  Diabetes management related comments/concerns: whats the best time to check BG  Difficulty affording diabetes medication?: No  Difficulty affording diabetes testing supplies?: No  Cultural Influences/Ethnic Background:  Not  or       Diabetes Symptoms & Complications:  Fatigue: No  Neuropathy: Sometimes (finger tips on left hand)  Polydipsia: No  Polyphagia: No  Polyuria: No  Visual change: No  Slow healing wounds: No  Symptom course: Improving  Weight trend: Decreasing  Complications assessed today?: Yes  Autonomic neuropathy: No  CVA: No  Heart disease: No  Nephropathy: No  Peripheral neuropathy: No  Peripheral Vascular Disease: No  Retinopathy: No  Sexual dysfunction: No    Patient Problem List and Family Medical History reviewed for relevant medical history, current medical status, and diabetes risk  "factors.    Vitals:  There were no vitals taken for this visit.  Estimated body mass index is 29.9 kg/m  as calculated from the following:    Height as of 8/29/22: 1.803 m (5' 11\").    Weight as of 12/1/22: 97.3 kg (214 lb 6.4 oz).   Last 3 BP:   BP Readings from Last 3 Encounters:   12/07/22 (!) 155/98   12/01/22 138/81   08/29/22 (!) 176/104       History   Smoking Status     Never   Smokeless Tobacco     Never       Labs:  Lab Results   Component Value Date    A1C 6.7 03/01/2023    A1C 7.5 01/10/2019     Lab Results   Component Value Date     12/07/2022     12/01/2022     Lab Results   Component Value Date    LDL 80 12/01/2022     Direct Measure HDL   Date Value Ref Range Status   12/01/2022 30 (L) >=40 mg/dL Final   ]  GFR Estimate   Date Value Ref Range Status   12/01/2022 >90 >60 mL/min/1.73m2 Final     Comment:     Effective December 21, 2021 eGFRcr in adults is calculated using the 2021 CKD-EPI creatinine equation which includes age and gender (Adriane et al., NEJ, DOI: 10.1056/WFBSwt1070334)     No results found for: GFRESTBLACK  Lab Results   Component Value Date    CR 0.67 12/01/2022     No results found for: MICROALBUMIN    Healthy Eating:  Healthy Eating Assessed Today: Yes  Meal planning/habits: Low carb (Keto)  How many times a week on average do you eat food made away from home (restaurant/take-out)?: 4  Meals include: Breakfast, Lunch, Dinner  Breakfast: 9a: greek yogurt with 2 slices of keto nut bread and keto jelly and natural PB, blueberries and 1 cup of milk OR eggs and vyas- sugar free jelly if toast  Lunch: out to lunch or skips: 1/2 turkey sandwich on whole wheat, creamy veggie soup with iced tea- unsweetened OR may skip lunch OR protein bar and apple  Dinner: 6-7p: salami with cashews OR sloppy joes on keto buns OR keto soup OR egg salad sandwich OR keto chicken breast with parmasean sauce  Snacks: carb smart ice cream with peanuts and butterscotch topping  Other: couple " sugar-free  Beverages: Water, Milk, Tea, Other, Diet soda (iced tea with artificial sweetener, sparkling ice)  Has patient met with a dietitian in the past?: Yes    Being Active:  Being Active Assessed Today: Yes  Exercise:: Yes  Days per week of moderate to strenuous exercise (like a brisk walk): 5  On average, minutes per day of exercise at this level: 10  How intense was your typical exercise? : Light (like stretching or slow walking)  Exercise Minutes per Week: 50    Monitoring:  Monitoring Assessed Today: Yes  Did patient bring glucose meter to appointment? : No  Blood Glucose Meter: Unknown  Times checking blood sugar at home (number): 1  Times checking blood sugar at home (per): Day  Blood glucose trend: No change    Pt reports:  Average  mg/dL Fasting  Every once in a while checks after eating and is lower than 140 mg/dL.  If he exercises BG is closer to 100 mg/dL    Taking Medications:  Diabetes Medication(s)     Biguanides       metFORMIN (GLUCOPHAGE XR) 500 MG 24 hr tablet    Take 2 tablets (1,000 mg) by mouth daily (with breakfast) AND 2 tablets (1,000 mg) daily (with dinner). New for diabetes          Taking Medication Assessed Today: Yes  Current Treatments: Oral Medication (taken by mouth)  Problems taking diabetes medications regularly?: No  Diabetes medication side effects?: No    Problem Solving:  Problem Solving Assessed Today: No  Is the patient at risk for hypoglycemia?: No  Is the patient at risk for DKA?: No              Reducing Risks:  Reducing Risks Assessed Today: No  Diabetes Risks: Age over 45 years, Sedentary Lifestyle  Has dilated eye exam at least once a year?: Yes  Sees dentist every 6 months?: Yes  Feet checked by healthcare provider in the last year?: No    Healthy Coping:  Healthy Coping Assessed Today: Yes  Emotional response to diabetes: Ready to learn  Informal Support system:: Children, Spouse  Stage of change: ACTION (Actively working towards change)  Patient Activation  Measure Survey Score:  No flowsheet data found.      Care Plan and Education Provided:  Care Plan: Diabetes   Updates made by Greta Shah since 3/30/2023 12:00 AM      Problem: Diabetes Self-Management Education Needed to Optimize Self-Care Behaviors       Goal: Healthy Eating - follow a healthy eating pattern for diabetes       Task: Provide education on weight management Completed 3/30/2023   Responsible User: Greta Shah      Task: Provide education on eating out    Responsible User: Greta Shah      Goal: Being Active - get regular physical activity, working up to at least 150 minutes per week    This Visit's Progress: On track   Recent Progress: 0%   Priority: High   Note:    My Goal: I will join [employer] with goal to increase physical activity    What I need to meet my goal: join and explore activities available    I plan to meet my goal by this date: 3 months          Deanna Shah RDN, LD, [facility]  Outpatient Diabetes Education  Adult Diabetes Education Triage [phone]      Time Spent: 20 minutes  Encounter Type: Individual    Any diabetes medication dose changes were made via the CDE Protocol per the patient's referring provider. A copy of this encounter was shared with the provider.

## 2023-03-30 NOTE — LETTER
3/30/2023         RE: Rohit William  [address] [facility] [address]        Dear Colleague,    Thank you for referring your patient, Rohit William, to the [facility]. Please see a copy of my visit note below.    Diabetes Self-Management Education & Support    Presents for: Follow-up    Type of Service: Telephone Visit    Originating Location (Patient Location): Home  Distant Location (Provider Location): [facility]  Mode of Communication:  Telephone    Telephone Visit Start Time: 9:30am  Telephone Visit End Time (telephone visit stop time): 9:50am    How would patient like to obtain AVS? MyChart    Assessment Type:   ASSESSMENT:  Telephone follow up with Rohit today to review management of newer Dx DM type 2.  He has been able to lower his A1C from 12.9% to 6.7%.  Encouraged his efforts with this.  He is doing well with consistent diet.  Says his wife helps with this and reads all labels for carbohydrate content.    He continues to report Metformin bothering his stomach.  Reports nausea and stomach pains.  Has tried reducing dose and taking less at a time.  Takes with food.  Variations have not helped with tolerance.  He also says when he reduced dose, he noticed his BG increased.  Reviewed some alternative medication options.  Will also communicate with PCP regarding medication.  Rohit plans to make appointment with PCP in April.  Is testing fasting BG daily, average  mg/dL.    Was active when he was on vacation in [place]; swimming, golf, walking.  Less active since coming back to [place] due to the weather and icy conditions.   Has played pickle ball with his son a few times lately.    Patient's most recent   Lab Results   Component Value Date    A1C 6.7 03/01/2023    A1C 7.5 01/10/2019     is meeting goal of <7.0    Diabetes knowledge and skills assessment:   Patient is knowledgeable in diabetes management concepts related  to: Healthy Eating, Being Active, Monitoring, Taking Medication, Reducing Risks and Healthy Coping    Continue education with the following diabetes management concepts: Healthy Eating, Taking Medication and Reducing Risks    Based on learning assessment above, most appropriate setting for further diabetes education would be: Individual setting.      PLAN    Medications to ask insurance about:  SGLT-2: Farxiga, Jardiance, Invokana    DPP-4: Januvia, Tradjenta    Sulfonylurea: Glipizide or Glimepiride     1) Continue to find ways to be active inside until weather improves    2) Great work with diet.  Continue to find balance with carbohydrate intake- needed for fuel, just important to use portion control.  Following an eating pattern can also help maintain steady blood sugars    3) Keep testing blood sugar daily so to help you stay on track    Topics to cover at upcoming visits: Reducing Risks    Follow-up: To be scheduled as needed    See Care Plan for co-developed, patient-state behavior change goals.  AVS provided for patient today.    Education Materials Provided:  No new materials provided today      SUBJECTIVE/OBJECTIVE:  Presents for: Follow-up  Accompanied by: Self  Diabetes education in the past 24mo: Yes  Focus of Visit: Monitoring, Taking Medication, Healthy Eating  Diabetes type: Type 2  Date of diagnosis: 2022  Disease course: Improving  Diabetes management related comments/concerns: whats the best time to check BG  Difficulty affording diabetes medication?: No  Difficulty affording diabetes testing supplies?: No  Cultural Influences/Ethnic Background:  Not  or       Diabetes Symptoms & Complications:  Fatigue: No  Neuropathy: Sometimes (finger tips on left hand)  Polydipsia: No  Polyphagia: No  Polyuria: No  Visual change: No  Slow healing wounds: No  Symptom course: Improving  Weight trend: Decreasing  Complications assessed today?: Yes  Autonomic neuropathy: No  CVA: No  Heart disease:  "No  Nephropathy: No  Peripheral neuropathy: No  Peripheral Vascular Disease: No  Retinopathy: No  Sexual dysfunction: No    Patient Problem List and Family Medical History reviewed for relevant medical history, current medical status, and diabetes risk factors.    Vitals:  There were no vitals taken for this visit.  Estimated body mass index is 29.9 kg/m  as calculated from the following:    Height as of 8/29/22: 1.803 m (5' 11\").    Weight as of 12/1/22: 97.3 kg (214 lb 6.4 oz).   Last 3 BP:   BP Readings from Last 3 Encounters:   12/07/22 (!) 155/98   12/01/22 138/81   08/29/22 (!) 176/104       History   Smoking Status     Never   Smokeless Tobacco     Never       Labs:  Lab Results   Component Value Date    A1C 6.7 03/01/2023    A1C 7.5 01/10/2019     Lab Results   Component Value Date     12/07/2022     12/01/2022     Lab Results   Component Value Date    LDL 80 12/01/2022     Direct Measure HDL   Date Value Ref Range Status   12/01/2022 30 (L) >=40 mg/dL Final   ]  GFR Estimate   Date Value Ref Range Status   12/01/2022 >90 >60 mL/min/1.73m2 Final     Comment:     Effective December 21, 2021 eGFRcr in adults is calculated using the 2021 CKD-EPI creatinine equation which includes age and gender (Adriane et al., NE, DOI: 10.1056/[id number])     No results found for: GFRESTBLACK  Lab Results   Component Value Date    CR 0.67 12/01/2022     No results found for: MICROALBUMIN    Healthy Eating:  Healthy Eating Assessed Today: Yes  Meal planning/habits: Low carb (Keto)  How many times a week on average do you eat food made away from home (restaurant/take-out)?: 4  Meals include: Breakfast, Lunch, Dinner  Breakfast: 9a: greek yogurt with 2 slices of keto nut bread and keto jelly and natural PB, blueberries and 1 cup of milk OR eggs and vyas- sugar free jelly if toast  Lunch: out to lunch or skips: 1/2 turkey sandwich on whole wheat, creamy veggie soup with iced tea- unsweetened OR may skip lunch OR " protein bar and apple  Dinner: 6-7p: salami with cashews OR sloppy joes on keto buns OR keto soup OR egg salad sandwich OR keto chicken breast with parmasean sauce  Snacks: carb smart ice cream with peanuts and butterscotch topping  Other: couple sugar-free  Beverages: Water, Milk, Tea, Other, Diet soda (iced tea with artificial sweetener, sparkling ice)  Has patient met with a dietitian in the past?: Yes    Being Active:  Being Active Assessed Today: Yes  Exercise:: Yes  Days per week of moderate to strenuous exercise (like a brisk walk): 5  On average, minutes per day of exercise at this level: 10  How intense was your typical exercise? : Light (like stretching or slow walking)  Exercise Minutes per Week: 50    Monitoring:  Monitoring Assessed Today: Yes  Did patient bring glucose meter to appointment? : No  Blood Glucose Meter: Unknown  Times checking blood sugar at home (number): 1  Times checking blood sugar at home (per): Day  Blood glucose trend: No change    Pt reports:  Average  mg/dL Fasting  Every once in a while checks after eating and is lower than 140 mg/dL.  If he exercises BG is closer to 100 mg/dL    Taking Medications:  Diabetes Medication(s)     Biguanides       metFORMIN (GLUCOPHAGE XR) 500 MG 24 hr tablet    Take 2 tablets (1,000 mg) by mouth daily (with breakfast) AND 2 tablets (1,000 mg) daily (with dinner). New for diabetes          Taking Medication Assessed Today: Yes  Current Treatments: Oral Medication (taken by mouth)  Problems taking diabetes medications regularly?: No  Diabetes medication side effects?: No    Problem Solving:  Problem Solving Assessed Today: No  Is the patient at risk for hypoglycemia?: No  Is the patient at risk for DKA?: No              Reducing Risks:  Reducing Risks Assessed Today: No  Diabetes Risks: Age over 45 years, Sedentary Lifestyle  Has dilated eye exam at least once a year?: Yes  Sees dentist every 6 months?: Yes  Feet checked by healthcare provider  in the last year?: No    Healthy Coping:  Healthy Coping Assessed Today: Yes  Emotional response to diabetes: Ready to learn  Informal Support system:: Children, Spouse  Stage of change: ACTION (Actively working towards change)  Patient Activation Measure Survey Score:  No flowsheet data found.      Care Plan and Education Provided:  Care Plan: Diabetes   Updates made by Greta Shah since 3/30/2023 12:00 AM      Problem: Diabetes Self-Management Education Needed to Optimize Self-Care Behaviors       Goal: Healthy Eating - follow a healthy eating pattern for diabetes       Task: Provide education on weight management Completed 3/30/2023   Responsible User: Greta Shah      Task: Provide education on eating out    Responsible User: Greta Shah      Goal: Being Active - get regular physical activity, working up to at least 150 minutes per week    This Visit's Progress: On track   Recent Progress: 0%   Priority: High   Note:    My Goal: I will join [employer] with goal to increase physical activity    What I need to meet my goal: join and explore activities available    I plan to meet my goal by this date: 3 months          Deanna Shah RDN, LD, [facility]  Outpatient Diabetes Education  Adult Diabetes Education Triage [phone]      Time Spent: 20 minutes  Encounter Type: Individual    Any diabetes medication dose changes were made via the CDE Protocol per the patient's referring provider. A copy of this encounter was shared with the provider.

## 2023-05-01 ENCOUNTER — MYC MEDICAL ADVICE (OUTPATIENT)
Dept: FAMILY MEDICINE | Facility: CLINIC | Age: 68
End: 2023-05-01
Payer: COMMERCIAL

## 2023-05-01 NOTE — TELEPHONE ENCOUNTER
Routing to provider to review and advise, see MyC message below.  Also refer to 3/30/23 [facility] Advice encounter in Chart Review to Diabetes Educator team.

## 2023-05-02 NOTE — TELEPHONE ENCOUNTER
I need a follow-up appointment to repeat blood pressure and med check. If patient can self-monitor blood pressure we can do a video (or telephone if can't do) to discuss more. Otherwise office appointment. Martin Marsh MD

## 2023-05-22 DIAGNOSIS — E11.69 TYPE 2 DIABETES MELLITUS WITH OTHER SPECIFIED COMPLICATION, UNSPECIFIED WHETHER LONG TERM INSULIN USE (H): ICD-10-CM

## 2023-05-22 DIAGNOSIS — I10 HYPERTENSION GOAL BP (BLOOD PRESSURE) < 140/90: ICD-10-CM

## 2023-05-22 RX ORDER — METFORMIN HCL 500 MG
TABLET, EXTENDED RELEASE 24 HR ORAL
Qty: 360 TABLET | Refills: 0 | Status: SHIPPED | OUTPATIENT
Start: 2023-05-22 | End: 2023-08-25

## 2023-05-22 RX ORDER — LOSARTAN POTASSIUM 100 MG/1
100 TABLET ORAL DAILY
Qty: 90 TABLET | Refills: 0 | Status: SHIPPED | OUTPATIENT
Start: 2023-05-22 | End: 2023-08-25

## 2023-05-25 ENCOUNTER — VIRTUAL VISIT (OUTPATIENT)
Dept: FAMILY MEDICINE | Facility: CLINIC | Age: 68
End: 2023-05-25
Payer: COMMERCIAL

## 2023-05-25 ENCOUNTER — TELEPHONE (OUTPATIENT)
Dept: FAMILY MEDICINE | Facility: CLINIC | Age: 68
End: 2023-05-25

## 2023-05-25 DIAGNOSIS — I10 HYPERTENSION GOAL BP (BLOOD PRESSURE) < 140/90: ICD-10-CM

## 2023-05-25 DIAGNOSIS — E11.69 TYPE 2 DIABETES MELLITUS WITH OTHER SPECIFIED COMPLICATION, UNSPECIFIED WHETHER LONG TERM INSULIN USE (H): ICD-10-CM

## 2023-05-25 PROCEDURE — 99214 OFFICE O/P EST MOD 30 MIN: CPT | Mod: VID | Performed by: FAMILY MEDICINE

## 2023-05-25 RX ORDER — HYDROCHLOROTHIAZIDE 25 MG/1
25 TABLET ORAL DAILY
Qty: 30 TABLET | Refills: 3 | Status: SHIPPED | OUTPATIENT
Start: 2023-05-25 | End: 2023-09-27

## 2023-05-25 NOTE — PROGRESS NOTES
Rohit is a 68 year old who is being evaluated via a billable video visit.      How would you like to obtain your AVS? MyChart  If the video visit is dropped, the invitation should be resent by: Send to e-mail at: [email or web address]  Will anyone else be joining your video visit? No      ASSESSMENT / PLAN:  (E11.69) Type 2 diabetes mellitus with other specified complication, unspecified whether long term insulin use (H)  Comment: stable  Plan: Renal panel, Hemoglobin A1c, Albumin Random         Urine Quantitative with Creat Ratio        Patient would like to lower metfromin from 4 to 3 tabs daily because upset stomach/loose stools. Consider adding different med if worse. Expected course and warning signs reviewed. Recheck in 3 months      (I10) Hypertension goal BP (blood pressure) < 140/90  Comment: needs help  Plan: hydrochlorothiazide (HYDRODIURIL) 25 MG tablet,        Renal panel, Albumin Random Urine Quantitative         with Creat Ratio        Add hydrochlorothiazide. Continue monitor/exercise and lower sodium. Reveiwed risks and side effects of medication  Combo with cozaar in future likely. Recheck in 3 months  Sooner if worse. Chest pain or shortness of breath to er.        Subjective   Rohit is a 68 year old, presenting for the following health issues:  Hypertension  follow-up dm/htn. hgba1c great turnaround   No side effects from cozaar. Diastolic a little high. No chest pain or shortness of breath. No history gout.  Exercise - some. Not monitoring sodium much. Monitoring blood sugars 140s-120's.       5/25/2023     8:42 AM   Additional Questions   Roomed by Darcy     History of Present Illness       Hypertension: He presents for follow up of hypertension.  He does check blood pressure  regularly outside of the clinic. Outside blood pressures have been over 140/90. He does not follow a low salt diet.     He eats 0-1 servings of fruits and vegetables daily.He consumes 0 sweetened beverage(s) daily.He  exercises with enough effort to increase his heart rate 60 or more minutes per day.  He exercises with enough effort to increase his heart rate 4 days per week.   He is taking medications regularly.           Objective           Vitals:  No vitals were obtained today due to virtual visit.    Physical Exam   GENERAL: Healthy, alert and no distress  EYES: Eyes grossly normal to inspection.  No discharge or erythema, or obvious scleral/conjunctival abnormalities.  RESP: No audible wheeze, cough, or visible cyanosis.  No visible retractions or increased work of breathing.    SKIN: Visible skin clear. No significant rash, abnormal pigmentation or lesions.  NEURO: Cranial nerves grossly intact.  Mentation and speech appropriate for age.  PSYCH: Mentation appears normal, affect normal/bright, judgement and insight intact, normal speech and appearance well-groomed.      Video-Visit Details    Type of service:  Video Visit   Video Start Time: 11:39 AM  Video End Time:11:51 AM    Originating Location (pt. Location): Home  Distant Location (provider location):  On-site  Platform used for Video Visit: Nikki

## 2023-05-25 NOTE — TELEPHONE ENCOUNTER
Needs follow-up md appointment in 3month with previsit NON fasting labs. Needs wellness visit please make sure appointment not too early.     Sent [email or web address] message to help schedule appointments.Shawna Cifuentes MA/LISA

## 2023-05-25 NOTE — Clinical Note
Needs follow-up md appointment in 3month with previsit NON fasting labs. Needs wellness visit please make sure appointment not too early.

## 2023-07-16 ENCOUNTER — HEALTH MAINTENANCE LETTER (OUTPATIENT)
Age: 68
End: 2023-07-16

## 2023-08-25 DIAGNOSIS — I10 HYPERTENSION GOAL BP (BLOOD PRESSURE) < 140/90: ICD-10-CM

## 2023-08-25 DIAGNOSIS — E11.69 TYPE 2 DIABETES MELLITUS WITH OTHER SPECIFIED COMPLICATION, UNSPECIFIED WHETHER LONG TERM INSULIN USE (H): ICD-10-CM

## 2023-08-25 RX ORDER — LOSARTAN POTASSIUM 100 MG/1
100 TABLET ORAL DAILY
Qty: 90 TABLET | Refills: 0 | Status: SHIPPED | OUTPATIENT
Start: 2023-08-25 | End: 2023-11-27

## 2023-08-25 RX ORDER — METFORMIN HCL 500 MG
TABLET, EXTENDED RELEASE 24 HR ORAL
Qty: 360 TABLET | Refills: 0 | Status: SHIPPED | OUTPATIENT
Start: 2023-08-25 | End: 2023-09-12

## 2023-09-05 ENCOUNTER — LAB (OUTPATIENT)
Dept: LAB | Facility: CLINIC | Age: 68
End: 2023-09-05
Payer: COMMERCIAL

## 2023-09-05 DIAGNOSIS — E11.69 TYPE 2 DIABETES MELLITUS WITH OTHER SPECIFIED COMPLICATION, UNSPECIFIED WHETHER LONG TERM INSULIN USE (H): ICD-10-CM

## 2023-09-05 DIAGNOSIS — I10 HYPERTENSION GOAL BP (BLOOD PRESSURE) < 140/90: ICD-10-CM

## 2023-09-05 LAB
ALBUMIN SERPL BCG-MCNC: 4.4 G/DL (ref 3.5–5.2)
ANION GAP SERPL CALCULATED.3IONS-SCNC: 14 MMOL/L (ref 7–15)
BUN SERPL-MCNC: 17.8 MG/DL (ref 8–23)
CALCIUM SERPL-MCNC: 9.4 MG/DL (ref 8.8–10.2)
CHLORIDE SERPL-SCNC: 103 MMOL/L (ref 98–107)
CREAT SERPL-MCNC: 0.76 MG/DL (ref 0.67–1.17)
CREAT UR-MCNC: 237 MG/DL
DEPRECATED HCO3 PLAS-SCNC: 22 MMOL/L (ref 22–29)
GFR SERPL CREATININE-BSD FRML MDRD: >90 ML/MIN/1.73M2
GLUCOSE SERPL-MCNC: 224 MG/DL (ref 70–99)
HBA1C MFR BLD: 7.3 % (ref 0–5.6)
MICROALBUMIN UR-MCNC: 88.3 MG/L
MICROALBUMIN/CREAT UR: 37.26 MG/G CR (ref 0–17)
PHOSPHATE SERPL-MCNC: 4.1 MG/DL (ref 2.5–4.5)
POTASSIUM SERPL-SCNC: 4.4 MMOL/L (ref 3.4–5.3)
SODIUM SERPL-SCNC: 139 MMOL/L (ref 136–145)

## 2023-09-05 PROCEDURE — 82043 UR ALBUMIN QUANTITATIVE: CPT

## 2023-09-05 PROCEDURE — 82570 ASSAY OF URINE CREATININE: CPT

## 2023-09-05 PROCEDURE — 80069 RENAL FUNCTION PANEL: CPT

## 2023-09-05 PROCEDURE — 83036 HEMOGLOBIN GLYCOSYLATED A1C: CPT

## 2023-09-05 PROCEDURE — 36415 COLL VENOUS BLD VENIPUNCTURE: CPT

## 2023-09-11 ASSESSMENT — ENCOUNTER SYMPTOMS
PARESTHESIAS: 0
FEVER: 0
DYSURIA: 0
NERVOUS/ANXIOUS: 0
CHILLS: 0
NAUSEA: 1
PALPITATIONS: 0
MYALGIAS: 0
HEADACHES: 0
DIZZINESS: 0
HEARTBURN: 0
SHORTNESS OF BREATH: 0
JOINT SWELLING: 0
CONSTIPATION: 0
FREQUENCY: 1
EYE PAIN: 0
SORE THROAT: 0
ABDOMINAL PAIN: 1
COUGH: 0
HEMATURIA: 0
ARTHRALGIAS: 0
DIARRHEA: 0
WEAKNESS: 0
HEMATOCHEZIA: 0

## 2023-09-11 ASSESSMENT — ACTIVITIES OF DAILY LIVING (ADL): CURRENT_FUNCTION: NO ASSISTANCE NEEDED

## 2023-09-12 ENCOUNTER — OFFICE VISIT (OUTPATIENT)
Dept: FAMILY MEDICINE | Facility: CLINIC | Age: 68
End: 2023-09-12
Payer: COMMERCIAL

## 2023-09-12 VITALS
DIASTOLIC BLOOD PRESSURE: 79 MMHG | BODY MASS INDEX: 29.17 KG/M2 | HEIGHT: 72 IN | SYSTOLIC BLOOD PRESSURE: 131 MMHG | OXYGEN SATURATION: 98 % | HEART RATE: 78 BPM | TEMPERATURE: 98.5 F | RESPIRATION RATE: 20 BRPM | WEIGHT: 215.4 LBS

## 2023-09-12 DIAGNOSIS — Z00.00 ENCOUNTER FOR MEDICARE ANNUAL WELLNESS EXAM: Primary | ICD-10-CM

## 2023-09-12 DIAGNOSIS — E11.69 TYPE 2 DIABETES MELLITUS WITH OTHER SPECIFIED COMPLICATION, UNSPECIFIED WHETHER LONG TERM INSULIN USE (H): ICD-10-CM

## 2023-09-12 DIAGNOSIS — Z23 NEED FOR TDAP VACCINATION: ICD-10-CM

## 2023-09-12 DIAGNOSIS — Z12.5 SCREENING PSA (PROSTATE SPECIFIC ANTIGEN): ICD-10-CM

## 2023-09-12 DIAGNOSIS — I10 HYPERTENSION GOAL BP (BLOOD PRESSURE) < 140/90: ICD-10-CM

## 2023-09-12 DIAGNOSIS — E78.5 HYPERLIPIDEMIA LDL GOAL <100: ICD-10-CM

## 2023-09-12 PROCEDURE — 99214 OFFICE O/P EST MOD 30 MIN: CPT | Mod: 25 | Performed by: FAMILY MEDICINE

## 2023-09-12 PROCEDURE — G0439 PPPS, SUBSEQ VISIT: HCPCS | Performed by: FAMILY MEDICINE

## 2023-09-12 RX ORDER — LOSARTAN POTASSIUM AND HYDROCHLOROTHIAZIDE 25; 100 MG/1; MG/1
1 TABLET ORAL DAILY
Qty: 90 TABLET | Refills: 1 | Status: SHIPPED | OUTPATIENT
Start: 2023-09-12 | End: 2024-03-13

## 2023-09-12 RX ORDER — METFORMIN HCL 500 MG
TABLET, EXTENDED RELEASE 24 HR ORAL
Qty: 360 TABLET | Refills: 1 | Status: SHIPPED | OUTPATIENT
Start: 2023-09-12 | End: 2024-05-07

## 2023-09-12 RX ORDER — ATORVASTATIN CALCIUM 10 MG/1
10 TABLET, FILM COATED ORAL DAILY
Qty: 90 TABLET | Refills: 1 | Status: SHIPPED | OUTPATIENT
Start: 2023-09-12 | End: 2024-03-13

## 2023-09-12 ASSESSMENT — ENCOUNTER SYMPTOMS
CHILLS: 0
COUGH: 0
WEAKNESS: 0
HEARTBURN: 0
PALPITATIONS: 0
DIARRHEA: 0
ABDOMINAL PAIN: 1
HEMATURIA: 0
FREQUENCY: 1
PARESTHESIAS: 0
HEADACHES: 0
FEVER: 0
HEMATOCHEZIA: 0
ARTHRALGIAS: 0
SORE THROAT: 0
NAUSEA: 1
DIZZINESS: 0
EYE PAIN: 0
SHORTNESS OF BREATH: 0
DYSURIA: 0
JOINT SWELLING: 0
CONSTIPATION: 0
NERVOUS/ANXIOUS: 0
MYALGIAS: 0

## 2023-09-12 ASSESSMENT — ACTIVITIES OF DAILY LIVING (ADL): CURRENT_FUNCTION: NO ASSISTANCE NEEDED

## 2023-09-12 ASSESSMENT — PAIN SCALES - GENERAL: PAINLEVEL: NO PAIN (0)

## 2023-09-12 NOTE — PROGRESS NOTES
"SUBJECTIVE:   Rohit is a 68 year old who presents for Preventive Visit.  follow-up dm/htn   Outside blood pressure reading ok. Pickle ball. Needs more weight lifting.  No chest pain or shortness of breath.   Dad with mi at 42yo.  No nausea, vomiting or diarrhea or black/bloody stools. No urine chnges or hmeaturia. No feet changes. Eye exam in spring -ok.   Dentits reguarly.  Memory ok. No fall.s  , 2 kids in 3 grandkids. All in [place].   Live close.   No rashes/mole changes. Taking vitmainD.   [facility] for  2 months.         9/12/2023     9:19 AM   Additional Questions   Roomed by GEOVANNA Soto CMA       Are you in the first 12 months of your Medicare coverage?  No    Healthy Habits:     In general, how would you rate your overall health?  Good    Frequency of exercise:  1 day/week    Duration of exercise:  Less than 15 minutes    Do you usually eat at least 4 servings of fruit and vegetables a day, include whole grains    & fiber and avoid regularly eating high fat or \"junk\" foods?  No    Taking medications regularly:  Yes    Medication side effects:  Other    Ability to successfully perform activities of daily living:  No assistance needed    Home Safety:  No safety concerns identified    Hearing Impairment:  No hearing concerns    In the past 6 months, have you been bothered by leaking of urine?  No    In general, how would you rate your overall mental or emotional health?  Excellent    Additional concerns today:  No        Have you ever done Advance Care Planning? (For example, a Health Directive, POLST, or a discussion with a medical provider or your loved ones about your wishes): No, advance care planning information given to patient to review.  Patient plans to discuss their wishes with loved ones or provider.      Normal conversational hearing  Fall risk  Fallen 2 or more times in the past year?: No  Any fall with injury in the past year?: No    Cognitive Screening   1) Repeat 3 items (Leader, Season, " Table)      2) Clock draw:   NORMAL  3) 3 item recall:}Recalls 3 objects  Results: 3 items recalled: COGNITIVE IMPAIRMENT LESS LIKELY    Mini-CogTM Copyright RIVER Cabrera. Licensed by the author for use in [facility]; reprinted with permission (kayla@[email or web address]). All rights reserved.      Do you have sleep apnea, excessive snoring or daytime drowsiness? : no    Reviewed and updated as needed this visit by clinical staff   Tobacco  Allergies  Meds              Reviewed and updated as needed this visit by Provider                 Social History     Tobacco Use    Smoking status: Never    Smokeless tobacco: Never   Substance Use Topics    Alcohol use: Yes             9/11/2023    12:14 PM   Alcohol Use   Prescreen: >3 drinks/day or >7 drinks/week? No     Do you have a current opioid prescription? No  Do you use any other controlled substances or medications that are not prescribed by a provider? None              Current providers sharing in care for this patient include:   Patient Care Team:  Martin Marsh MD as PCP - General (Family Medicine)  Martin Marsh MD as Assigned PCP  Greta Shah as Diabetes Educator    The following health maintenance items are reviewed in Epic and correct as of today:  Health Maintenance   Topic Date Due    DIABETIC FOOT EXAM  Never done    ANNUAL REVIEW OF HM ORDERS  Never done    EYE EXAM  Never done    COVID-19 Vaccine (1) Never done    Pneumococcal Vaccine: 65+ Years (1 - PCV) Never done    HEPATITIS C SCREENING  Never done    DTAP/TDAP/TD IMMUNIZATION (1 - Tdap) Never done    ZOSTER IMMUNIZATION (1 of 2) Never done    AORTIC ANEURYSM SCREENING (SYSTEM ASSIGNED)  Never done    INFLUENZA VACCINE (1) 09/01/2023    MEDICARE ANNUAL WELLNESS VISIT  08/29/2023    LIPID  12/01/2023    A1C  12/05/2023    BMP  09/05/2024    MICROALBUMIN  09/05/2024    FALL RISK ASSESSMENT  09/12/2024    ADVANCE CARE PLANNING  08/29/2027    COLORECTAL CANCER SCREENING  12/07/2027     PHQ-2 (once per calendar year)  Completed    IPV IMMUNIZATION  Aged Out    HPV IMMUNIZATION  Aged Out    MENINGITIS IMMUNIZATION  Aged Out               Review of Systems   Constitutional:  Negative for chills and fever.   HENT:  Negative for congestion, ear pain, hearing loss and sore throat.    Eyes:  Negative for pain and visual disturbance.   Respiratory:  Negative for cough and shortness of breath.    Cardiovascular:  Negative for chest pain, palpitations and peripheral edema.   Gastrointestinal:  Positive for abdominal pain and nausea. Negative for constipation, diarrhea, heartburn and hematochezia.   Genitourinary:  Positive for frequency, impotence and urgency. Negative for dysuria, genital sores, hematuria and penile discharge.   Musculoskeletal:  Negative for arthralgias, joint swelling and myalgias.   Skin:  Negative for rash.   Neurological:  Negative for dizziness, weakness, headaches and paresthesias.   Psychiatric/Behavioral:  Negative for mood changes. The patient is not nervous/anxious.          OBJECTIVE:   /79   Pulse 78   Temp 98.5  F (36.9  C) (Oral)   Resp 20   Ht 1.829 m (6')   Wt 97.7 kg (215 lb 6.4 oz)   SpO2 98%   BMI 29.21 kg/m     Physical Exam  GENERAL: healthy, alert and no distress  EYES: Eyes grossly normal to inspection, PERRL and conjunctivae and sclerae normal  HENT: ear canals and TM's normal, nose and mouth without ulcers or lesions  NECK: no adenopathy, no asymmetry, masses, or scars and thyroid normal to palpation  RESP: lungs clear to auscultation - no rales, rhonchi or wheezes  BREAST: normal without masses, tenderness or nipple discharge and no palpable axillary masses or adenopathy  CV: regular rate and rhythm, normal S1 S2, no S3 or S4, no murmur, click or rub, no peripheral edema and peripheral pulses strong  ABDOMEN: soft, nontender, no hepatosplenomegaly, no masses and bowel sounds normal   (male): patient deferred /rectal exams  MS: no gross  musculoskeletal defects noted, no edema  SKIN: no suspicious lesions or rashes  NEURO: Normal strength and tone, mentation intact and speech normal  PSYCH: mentation appears normal, affect normal/bright  LYMPH: no cervical, supraclavicular, axillary, or inguinal adenopathy    ASSESSMENT / PLAN:   ASSESSMENT / PLAN:  (Z00.00) Encounter for Medicare annual wellness exam  (primary encounter diagnosis)  Comment: generally healthy and normal exam. Memory ok. No falls and great support system  Plan: Reviewed self mole/testicle check handout.  vitaminD. All weight lifting to cardio.       (I10) Hypertension goal BP (blood pressure) < 140/90  Comment: stabld  Plan: Renal panel, losartan-hydrochlorothiazide         (HYZAAR) 100-25 MG tablet        Continue self-monitor. Will make pills in combo pill. Recheck in 6 months      (E11.69) Type 2 diabetes mellitus with other specified complication, unspecified whether long term insulin use (H)  Comment: stalbe  Plan: metFORMIN (GLUCOPHAGE XR) 500 MG 24 hr tablet,         atorvastatin (LIPITOR) 10 MG tablet        Some issues with loose stools. Consider lower dosage and possibly add januvia/etc. Recheck in 6 months  Call/email with questions/concerns      (Z12.5) Screening PSA (prostate specific antigen)  Plan: Prostate Specific Antigen Screen        future    (E78.5) Hyperlipidemia LDL goal <100  Comment: not bad but needs to be on statin  Plan: Lipid Profile        Lipitor. Reveiwed risks and side effects of medication  Recheck in 6 months,  Chest pain or shortness of breath to er.       Patient has been advised of split billing requirements and indicates understanding: Yes      COUNSELING:  Reviewed preventive health counseling, as reflected in patient instructions       Regular exercise       Healthy diet/nutrition       Vision screening       Hearing screening       Dental care       Bladder control       Fall risk prevention       Aspirin prophylaxis        Alcohol Use         Colon cancer screening       Prostate cancer screening      BMI:   Estimated body mass index is 29.21 kg/m  as calculated from the following:    Height as of this encounter: 1.829 m (6').    Weight as of this encounter: 97.7 kg (215 lb 6.4 oz).   Weight management plan: Discussed healthy diet and exercise guidelines      He reports that he has never smoked. He has never used smokeless tobacco.      Appropriate preventive services were discussed with this patient, including applicable screening as appropriate for cardiovascular disease, diabetes, osteopenia/osteoporosis, and glaucoma.  As appropriate for age/gender, discussed screening for colorectal cancer, prostate cancer, breast cancer, and cervical cancer. Checklist reviewing preventive services available has been given to the patient.    Reviewed patients plan of care and provided an AVS. The Intermediate Care Plan ( asthma action plan, low back pain action plan, and migraine action plan) for Rohit meets the Care Plan requirement. This Care Plan has been established and reviewed with the Patient.          Martin Marsh MD  [facility]    Identified Health Risks:  I have reviewed Opioid Use Disorder and Substance Use Disorder risk factors and made any needed referrals. He is at risk for lack of exercise and has been provided with information to increase physical activity for the benefit of his well-being.  The patient was counseled and encouraged to consider modifying their diet and eating habits. He was provided with information on recommended healthy diet options.

## 2023-09-12 NOTE — PATIENT INSTRUCTIONS
"Patient Education   Personalized Prevention Plan  You are due for the preventive services outlined below.  Your care team is available to assist you in scheduling these services.  If you have already completed any of these items, please share that information with your care team to update in your medical record.  Health Maintenance Due   Topic Date Due     Diabetic Foot Exam  Never done     ANNUAL REVIEW OF HM ORDERS  Never done     Eye Exam  Never done     COVID-19 Vaccine (1) Never done     Pneumococcal Vaccine (1 - PCV) Never done     Hepatitis C Screening  Never done     Diptheria Tetanus Pertussis (DTAP/TDAP/TD) Vaccine (1 - Tdap) Never done     Zoster (Shingles) Vaccine (1 of 2) Never done     AORTIC ANEURYSM SCREENING (SYSTEM ASSIGNED)  Never done     Flu Vaccine (1) 09/01/2023     Annual Wellness Visit  08/29/2023     Learning About Being Physically Active  What is physical activity?     Being physically active means doing any kind of activity that gets your body moving.  The types of physical activity that can help you get fit and stay healthy include:  Aerobic or \"cardio\" activities. These make your heart beat faster and make you breathe harder, such as brisk walking, riding a bike, or running. They strengthen your heart and lungs and build up your endurance.  Strength training activities. These make your muscles work against, or \"resist,\" something. Examples include lifting weights or doing push-ups. These activities help tone and strengthen your muscles and bones.  Stretches. These let you move your joints and muscles through their full range of motion. Stretching helps you be more flexible.  Reaching a balance between these three types of physical activity is important because each one contributes to your overall fitness.  What are the benefits of being active?  Being active is one of the best things you can do for your health. It helps you to:  Feel stronger and have more energy to do all the things " "you like to do.  Focus better at school or work.  Feel, think, and sleep better.  Reach and stay at a healthy weight.  Lose fat and build lean muscle.  Lower your risk for serious health problems, including diabetes, heart attack, high blood pressure, and some cancers.  Keep your heart, lungs, bones, muscles, and joints strong and healthy.  How can you make being active part of your life?  Start slowly. Make it your long-term goal to get at least 30 minutes of exercise on most days of the week. Walking is a good choice. You also may want to do other activities, such as running, swimming, cycling, or playing tennis or team sports.  Pick activities that you like--ones that make your heart beat faster, your muscles stronger, and your muscles and joints more flexible. If you find more than one thing you like doing, do them all. You don't have to do the same thing every day.  Get your heart pumping every day. Any activity that makes your heart beat faster and keeps it at that rate for a while counts.  Here are some great ways to get your heart beating faster:  Go for a brisk walk, run, or bike ride.  Go for a hike or swim.  Go in-line skating.  Play a game of touch football, basketball, or soccer.  Ride a bike.  Play tennis or racquetball.  Climb stairs.  Even some household chores can be aerobic--just do them at a faster pace. Vacuuming, raking or mowing the lawn, sweeping the garage, and washing and waxing the car all can help get your heart rate up.  Strengthen your muscles during the week. You don't have to lift heavy weights or grow big, bulky muscles to get stronger. Doing a few simple activities that make your muscles work against, or \"resist,\" something can help you get stronger.  For example, you can:  Do push-ups or sit-ups, which use your own body weight as resistance.  Lift weights or dumbbells or use stretch bands at home or in a gym or community center.  Stretch your muscles often. Stretching will help you " "as you become more active. It can help you stay flexible, loosen tight muscles, and avoid injury. It can also help improve your balance and posture and can be a great way to relax.  Be sure to stretch the muscles you'll be using when you work out. It's best to warm your muscles slightly before you stretch them. Walk or do some other light aerobic activity for a few minutes, and then start stretching.  When you stretch your muscles:  Do it slowly. Stretching is not about going fast or making sudden movements.  Don't push or bounce during a stretch.  Hold each stretch for at least 15 to 30 seconds, if you can. You should feel a stretch in the muscle, but not pain.  Breathe out as you do the stretch. Then breathe in as you hold the stretch. Don't hold your breath.  If you're worried about how more activity might affect your health, have a checkup before you start. Follow any special advice your doctor gives you for getting a smart start.  Where can you learn more?  Go to https://www.[employer]iator.com/patiented  Enter W332 in the search box to learn more about \"Learning About Being Physically Active.\"  Current as of: October 10, 2022               Content Version: 13.7    9847-0662 [employer].   Care instructions adapted under license by your healthcare professional. If you have questions about a medical condition or this instruction, always ask your healthcare professional. [employer] disclaims any warranty or liability for your use of this information.      Learning About Dietary Guidelines  What are the Dietary Guidelines for Americans?     Dietary Guidelines for Americans provide tips for eating well and staying healthy. This helps reduce the risk for long-term (chronic) diseases.  These guidelines recommend that you:  Eat and drink the right amount for you. The U.S. government's food guide is called MyPlate. It can help you make your own well-balanced eating plan.  Try to balance your " "eating with your activity. This helps you stay at a healthy weight.  Drink alcohol in moderation, if at all.  Limit foods high in salt, saturated fat, trans fat, and added sugar.  These guidelines are from the U.S. Department of Agriculture and the U.S. Department of Health and Human Services. They are updated every 5 years.  What is MyPlate?  MyPlate is the U.S. government's food guide. It can help you make your own well-balanced eating plan. A balanced eating plan means that you eat enough, but not too much, and that your food gives you the nutrients you need to stay healthy.  MyPlate focuses on eating plenty of whole grains, fruits, and vegetables, and on limiting fat and sugar. It is available online at www.ChooseMyPlate.gov.  How can you get started?  If you're trying to eat healthier, you can slowly change your eating habits over time. You don't have to make big changes all at once. Start by adding one or two healthy foods to your meals each day.  Grains  Choose whole-grain breads and cereals and whole-wheat pasta and whole-grain crackers.  Vegetables  Eat a variety of vegetables every day. They have lots of nutrients and are part of a heart-healthy diet.  Fruits  Eat a variety of fruits every day. Fruits contain lots of nutrients. Choose fresh fruit instead of fruit juice.  Protein foods  Choose fish and lean poultry more often. Eat red meat and fried meats less often. Dried beans, tofu, and nuts are also good sources of protein.  Dairy  Choose low-fat or fat-free products from this food group. If you have problems digesting milk, try eating cheese or yogurt instead.  Fats and oils  Limit fats and oils if you're trying to cut calories. Choose healthy fats when you cook. These include canola oil and olive oil.  Where can you learn more?  Go to https://www.healthwise.net/patiented  Enter D676 in the search box to learn more about \"Learning About Dietary Guidelines.\"  Current as of: March 1, " 2023               Content Version: 13.7    6087-3352 [employer].   Care instructions adapted under license by your healthcare professional. If you have questions about a medical condition or this instruction, always ask your healthcare professional. [employer] disclaims any warranty or liability for your use of this information.

## 2023-09-27 DIAGNOSIS — I10 HYPERTENSION GOAL BP (BLOOD PRESSURE) < 140/90: ICD-10-CM

## 2023-09-27 RX ORDER — HYDROCHLOROTHIAZIDE 25 MG/1
TABLET ORAL
Qty: 90 TABLET | Refills: 2 | Status: SHIPPED | OUTPATIENT
Start: 2023-09-27 | End: 2024-05-07

## 2023-10-01 DIAGNOSIS — E11.69 TYPE 2 DIABETES MELLITUS WITH OTHER SPECIFIED COMPLICATION, UNSPECIFIED WHETHER LONG TERM INSULIN USE (H): ICD-10-CM

## 2023-10-02 RX ORDER — BLOOD SUGAR DIAGNOSTIC
STRIP MISCELLANEOUS
Qty: 100 STRIP | Refills: 0 | Status: SHIPPED | OUTPATIENT
Start: 2023-10-02 | End: 2023-12-29

## 2023-11-26 DIAGNOSIS — I10 HYPERTENSION GOAL BP (BLOOD PRESSURE) < 140/90: ICD-10-CM

## 2023-11-27 RX ORDER — LOSARTAN POTASSIUM 100 MG/1
100 TABLET ORAL DAILY
Qty: 90 TABLET | Refills: 2 | Status: SHIPPED | OUTPATIENT
Start: 2023-11-27 | End: 2024-05-07

## 2023-12-29 DIAGNOSIS — E11.69 TYPE 2 DIABETES MELLITUS WITH OTHER SPECIFIED COMPLICATION, UNSPECIFIED WHETHER LONG TERM INSULIN USE (H): ICD-10-CM

## 2023-12-29 RX ORDER — BLOOD SUGAR DIAGNOSTIC
STRIP MISCELLANEOUS
Qty: 100 STRIP | Refills: 0 | Status: SHIPPED | OUTPATIENT
Start: 2023-12-29 | End: 2024-03-26

## 2024-02-11 ENCOUNTER — HEALTH MAINTENANCE LETTER (OUTPATIENT)
Age: 69
End: 2024-02-11

## 2024-02-12 ENCOUNTER — MYC REFILL (OUTPATIENT)
Dept: EDUCATION SERVICES | Facility: CLINIC | Age: 69
End: 2024-02-12
Payer: COMMERCIAL

## 2024-02-12 DIAGNOSIS — E11.69 TYPE 2 DIABETES MELLITUS WITH OTHER SPECIFIED COMPLICATION, UNSPECIFIED WHETHER LONG TERM INSULIN USE (H): ICD-10-CM

## 2024-03-13 DIAGNOSIS — I10 HYPERTENSION GOAL BP (BLOOD PRESSURE) < 140/90: ICD-10-CM

## 2024-03-13 DIAGNOSIS — E11.69 TYPE 2 DIABETES MELLITUS WITH OTHER SPECIFIED COMPLICATION, UNSPECIFIED WHETHER LONG TERM INSULIN USE (H): ICD-10-CM

## 2024-03-13 RX ORDER — ATORVASTATIN CALCIUM 10 MG/1
10 TABLET, FILM COATED ORAL DAILY
Qty: 90 TABLET | Refills: 0 | Status: SHIPPED | OUTPATIENT
Start: 2024-03-13 | End: 2024-05-07

## 2024-03-13 RX ORDER — LOSARTAN POTASSIUM AND HYDROCHLOROTHIAZIDE 25; 100 MG/1; MG/1
1 TABLET ORAL DAILY
Qty: 90 TABLET | Refills: 1 | Status: SHIPPED | OUTPATIENT
Start: 2024-03-13 | End: 2024-05-07

## 2024-03-26 DIAGNOSIS — E11.69 TYPE 2 DIABETES MELLITUS WITH OTHER SPECIFIED COMPLICATION, UNSPECIFIED WHETHER LONG TERM INSULIN USE (H): ICD-10-CM

## 2024-03-26 RX ORDER — BLOOD SUGAR DIAGNOSTIC
STRIP MISCELLANEOUS
Qty: 100 STRIP | Refills: 1 | Status: SHIPPED | OUTPATIENT
Start: 2024-03-26

## 2024-04-04 ENCOUNTER — LAB (OUTPATIENT)
Dept: LAB | Facility: CLINIC | Age: 69
End: 2024-04-04
Payer: COMMERCIAL

## 2024-04-04 DIAGNOSIS — E78.5 HYPERLIPIDEMIA LDL GOAL <100: ICD-10-CM

## 2024-04-04 DIAGNOSIS — Z12.5 SCREENING PSA (PROSTATE SPECIFIC ANTIGEN): ICD-10-CM

## 2024-04-04 DIAGNOSIS — I10 HYPERTENSION GOAL BP (BLOOD PRESSURE) < 140/90: ICD-10-CM

## 2024-04-04 LAB
ALBUMIN SERPL BCG-MCNC: 4.4 G/DL (ref 3.5–5.2)
ANION GAP SERPL CALCULATED.3IONS-SCNC: 12 MMOL/L (ref 7–15)
BUN SERPL-MCNC: 23 MG/DL (ref 8–23)
CALCIUM SERPL-MCNC: 9.9 MG/DL (ref 8.8–10.2)
CHLORIDE SERPL-SCNC: 103 MMOL/L (ref 98–107)
CHOLEST SERPL-MCNC: 130 MG/DL
CREAT SERPL-MCNC: 0.84 MG/DL (ref 0.67–1.17)
DEPRECATED HCO3 PLAS-SCNC: 24 MMOL/L (ref 22–29)
EGFRCR SERPLBLD CKD-EPI 2021: >90 ML/MIN/1.73M2
FASTING STATUS PATIENT QL REPORTED: YES
GLUCOSE SERPL-MCNC: 172 MG/DL (ref 70–99)
HDLC SERPL-MCNC: 29 MG/DL
LDLC SERPL CALC-MCNC: 55 MG/DL
NONHDLC SERPL-MCNC: 101 MG/DL
PHOSPHATE SERPL-MCNC: 4.5 MG/DL (ref 2.5–4.5)
POTASSIUM SERPL-SCNC: 5.3 MMOL/L (ref 3.4–5.3)
PSA SERPL DL<=0.01 NG/ML-MCNC: 1.16 NG/ML (ref 0–6.5)
SODIUM SERPL-SCNC: 139 MMOL/L (ref 135–145)
TRIGL SERPL-MCNC: 230 MG/DL

## 2024-04-04 PROCEDURE — G0103 PSA SCREENING: HCPCS

## 2024-04-04 PROCEDURE — 80061 LIPID PANEL: CPT

## 2024-04-04 PROCEDURE — 36415 COLL VENOUS BLD VENIPUNCTURE: CPT

## 2024-04-04 PROCEDURE — 80069 RENAL FUNCTION PANEL: CPT

## 2024-05-07 ENCOUNTER — OFFICE VISIT (OUTPATIENT)
Dept: FAMILY MEDICINE | Facility: CLINIC | Age: 69
End: 2024-05-07
Payer: COMMERCIAL

## 2024-05-07 VITALS
RESPIRATION RATE: 24 BRPM | OXYGEN SATURATION: 96 % | HEART RATE: 94 BPM | BODY MASS INDEX: 30.11 KG/M2 | TEMPERATURE: 98.6 F | WEIGHT: 227.2 LBS | SYSTOLIC BLOOD PRESSURE: 139 MMHG | DIASTOLIC BLOOD PRESSURE: 87 MMHG | HEIGHT: 73 IN

## 2024-05-07 DIAGNOSIS — I10 HYPERTENSION GOAL BP (BLOOD PRESSURE) < 140/90: ICD-10-CM

## 2024-05-07 DIAGNOSIS — E11.69 TYPE 2 DIABETES MELLITUS WITH OTHER SPECIFIED COMPLICATION, UNSPECIFIED WHETHER LONG TERM INSULIN USE (H): Primary | ICD-10-CM

## 2024-05-07 LAB
HBA1C MFR BLD: 7 % (ref 0–5.6)
HOLD SPECIMEN: NORMAL
HOLD SPECIMEN: NORMAL

## 2024-05-07 PROCEDURE — 36415 COLL VENOUS BLD VENIPUNCTURE: CPT | Performed by: FAMILY MEDICINE

## 2024-05-07 PROCEDURE — G2211 COMPLEX E/M VISIT ADD ON: HCPCS | Performed by: FAMILY MEDICINE

## 2024-05-07 PROCEDURE — 99214 OFFICE O/P EST MOD 30 MIN: CPT | Performed by: FAMILY MEDICINE

## 2024-05-07 PROCEDURE — 83036 HEMOGLOBIN GLYCOSYLATED A1C: CPT | Performed by: FAMILY MEDICINE

## 2024-05-07 RX ORDER — RESPIRATORY SYNCYTIAL VIRUS VACCINE 120MCG/0.5
0.5 KIT INTRAMUSCULAR ONCE
Qty: 1 EACH | Refills: 0 | Status: CANCELLED | OUTPATIENT
Start: 2024-05-07 | End: 2024-05-07

## 2024-05-07 RX ORDER — LOSARTAN POTASSIUM AND HYDROCHLOROTHIAZIDE 25; 100 MG/1; MG/1
1 TABLET ORAL DAILY
Qty: 90 TABLET | Refills: 1 | Status: SHIPPED | OUTPATIENT
Start: 2024-05-07

## 2024-05-07 RX ORDER — ATORVASTATIN CALCIUM 10 MG/1
10 TABLET, FILM COATED ORAL DAILY
Qty: 90 TABLET | Refills: 3 | Status: SHIPPED | OUTPATIENT
Start: 2024-05-07

## 2024-05-07 RX ORDER — METFORMIN HCL 500 MG
TABLET, EXTENDED RELEASE 24 HR ORAL
Qty: 360 TABLET | Refills: 1 | Status: SHIPPED | OUTPATIENT
Start: 2024-05-07

## 2024-05-07 ASSESSMENT — PAIN SCALES - GENERAL: PAINLEVEL: NO PAIN (0)

## 2024-05-07 NOTE — PROGRESS NOTES
ASSESSMENT / PLAN:  (E11.69) Type 2 diabetes mellitus with other specified complication, unspecified whether long term insulin use (H)  (primary encounter diagnosis)  Comment: stable  Plan: HEMOGLOBIN A1C, metFORMIN (GLUCOPHAGE XR) 500         MG 24 hr tablet, atorvastatin (LIPITOR) 10 MG         tablet        Continue diet/exercise. 5 lbs weight loss. Follow-up eye exam. Recheck in 6 months  Sooner if new issues. Call/email with questions/concerns    The longitudinal plan of care for the diagnosis(es)/condition(s) as documented were addressed during this visit. Due to the added complexity in care, I will continue to support Rohit in the subsequent management and with ongoing continuity of care.        (I10) Hypertension goal BP (blood pressure) < 140/90  Comment: stable  Plan: losartan-hydrochlorothiazide (HYZAAR) 100-25 MG        tablet        Continue exercise and self-monitor. Chest pain or shortness of breath to er.   Back to combo pill. Add toprol if worse       Subjective   Rohit is a 69 year old, presenting for the following health issues:  Follow-up dm, htn and high cholesterol.   Outside blood pressure reading doing ok.   Was in [place] for a months.  No chest pain or shortness of breath.   No nausea, vomiting or diarrhea or black/bloody stools. No urine changes or hematuria.   No feet changes. Due for eye exam.   Hypertension and Diabetes      5/7/2024     6:55 AM   Additional Questions   Roomed by GEOVANNA Soto CMA     History of Present Illness       Diabetes:   He presents for follow up of diabetes.  He is checking home blood glucose one time daily.   He checks blood glucose before meals.  Blood glucose is never over 200 and never under 70.  When his blood glucose is low, the patient is asymptomatic for confusion, blurred vision, lethargy and reports not feeling dizzy, shaky, or weak.   He has no concerns regarding his diabetes at this time.   He is not experiencing numbness or burning in feet, excessive  "thirst, blurry vision, weight changes or redness, sores or blisters on feet. The patient has not had a diabetic eye exam in the last 12 months.          Hypertension: He presents for follow up of hypertension.  He does check blood pressure  regularly outside of the clinic. Outside blood pressures have been over 140/90. He does not follow a low salt diet.     He eats 0-1 servings of fruits and vegetables daily.He consumes 0 sweetened beverage(s) daily.He exercises with enough effort to increase his heart rate 30 to 60 minutes per day.  He exercises with enough effort to increase his heart rate 3 or less days per week.   He is taking medications regularly.                     Objective    /87   Pulse 94   Temp 98.6  F (37  C) (Oral)   Resp 24   Ht 1.842 m (6' 0.5\")   Wt 103.1 kg (227 lb 3.2 oz)   SpO2 96%   BMI 30.39 kg/m     Physical Exam   GENERAL: alert and no distress  EYES: Eyes grossly normal to inspection, PERRL and conjunctivae and sclerae normal  NECK: no adenopathy, no asymmetry, masses, or scars  RESP: lungs clear to auscultation - no rales, rhonchi or wheezes  CV: regular rate and rhythm, normal S1 S2, no S3 or S4, no murmur, click or rub, no peripheral edema   ABDOMEN: soft, nontender, no hepatosplenomegaly, no masses and bowel sounds normal  MS: no gross musculoskeletal defects noted, no edema  NEURO: Normal strength and tone, mentation intact and speech normal  PSYCH: mentation appears normal, affect normal/bright            Signed Electronically by: Martin Marsh MD    "

## 2024-05-17 ENCOUNTER — TRANSFERRED RECORDS (OUTPATIENT)
Dept: MULTI SPECIALTY CLINIC | Facility: CLINIC | Age: 69
End: 2024-05-17

## 2024-05-17 LAB — RETINOPATHY: NORMAL

## 2024-07-26 ENCOUNTER — MYC REFILL (OUTPATIENT)
Dept: EDUCATION SERVICES | Facility: CLINIC | Age: 69
End: 2024-07-26
Payer: COMMERCIAL

## 2024-07-26 DIAGNOSIS — E11.69 TYPE 2 DIABETES MELLITUS WITH OTHER SPECIFIED COMPLICATION, UNSPECIFIED WHETHER LONG TERM INSULIN USE (H): ICD-10-CM

## 2024-08-29 DIAGNOSIS — E11.69 TYPE 2 DIABETES MELLITUS WITH OTHER SPECIFIED COMPLICATION, UNSPECIFIED WHETHER LONG TERM INSULIN USE (H): ICD-10-CM

## 2024-08-29 RX ORDER — METFORMIN HCL 500 MG
TABLET, EXTENDED RELEASE 24 HR ORAL
Qty: 360 TABLET | Refills: 1 | OUTPATIENT
Start: 2024-08-29

## 2024-09-08 ENCOUNTER — HEALTH MAINTENANCE LETTER (OUTPATIENT)
Age: 69
End: 2024-09-08

## 2024-09-08 DIAGNOSIS — E11.69 TYPE 2 DIABETES MELLITUS WITH OTHER SPECIFIED COMPLICATION, UNSPECIFIED WHETHER LONG TERM INSULIN USE (H): ICD-10-CM

## 2024-09-09 RX ORDER — METFORMIN HCL 500 MG
TABLET, EXTENDED RELEASE 24 HR ORAL
Qty: 360 TABLET | Refills: 1 | OUTPATIENT
Start: 2024-09-09

## 2024-10-18 ENCOUNTER — DOCUMENTATION ONLY (OUTPATIENT)
Dept: FAMILY MEDICINE | Facility: CLINIC | Age: 69
End: 2024-10-18
Payer: COMMERCIAL

## 2024-10-18 DIAGNOSIS — E11.69 TYPE 2 DIABETES MELLITUS WITH OTHER SPECIFIED COMPLICATION, UNSPECIFIED WHETHER LONG TERM INSULIN USE (H): Primary | ICD-10-CM

## 2024-10-18 NOTE — PROGRESS NOTES
Rohit JOE William has an upcoming lab appointment:    Future Appointments   Date Time Provider Department Center   10/31/2024  8:30 AM [facility]   10/31/2024 10:00 AM Martin Marsh MD [facility]   11/7/2024  8:30 AM Martin Marsh MD [facility]         There is no order available. Please review and place either future orders or HMPO (Review of Health Maintenance Protocol Orders), as appropriate.    Health Maintenance Due   Topic    ANNUAL REVIEW OF HM ORDERS     HEPATITIS C SCREENING     A1C     MICROALBUMIN      Merle Eaton

## 2024-10-30 SDOH — HEALTH STABILITY: PHYSICAL HEALTH: ON AVERAGE, HOW MANY MINUTES DO YOU ENGAGE IN EXERCISE AT THIS LEVEL?: 20 MIN

## 2024-10-30 SDOH — HEALTH STABILITY: PHYSICAL HEALTH: ON AVERAGE, HOW MANY DAYS PER WEEK DO YOU ENGAGE IN MODERATE TO STRENUOUS EXERCISE (LIKE A BRISK WALK)?: 1 DAY

## 2024-10-30 ASSESSMENT — SOCIAL DETERMINANTS OF HEALTH (SDOH): HOW OFTEN DO YOU GET TOGETHER WITH FRIENDS OR RELATIVES?: MORE THAN THREE TIMES A WEEK

## 2024-10-31 ENCOUNTER — LAB (OUTPATIENT)
Dept: LAB | Facility: CLINIC | Age: 69
End: 2024-10-31
Payer: COMMERCIAL

## 2024-10-31 ENCOUNTER — OFFICE VISIT (OUTPATIENT)
Dept: FAMILY MEDICINE | Facility: CLINIC | Age: 69
End: 2024-10-31
Attending: FAMILY MEDICINE
Payer: COMMERCIAL

## 2024-10-31 VITALS
BODY MASS INDEX: 31.29 KG/M2 | HEIGHT: 72 IN | DIASTOLIC BLOOD PRESSURE: 77 MMHG | WEIGHT: 231 LBS | OXYGEN SATURATION: 97 % | HEART RATE: 92 BPM | RESPIRATION RATE: 16 BRPM | SYSTOLIC BLOOD PRESSURE: 138 MMHG | TEMPERATURE: 97.9 F

## 2024-10-31 DIAGNOSIS — E11.69 TYPE 2 DIABETES MELLITUS WITH OTHER SPECIFIED COMPLICATION, UNSPECIFIED WHETHER LONG TERM INSULIN USE (H): ICD-10-CM

## 2024-10-31 DIAGNOSIS — Z00.00 ENCOUNTER FOR MEDICARE ANNUAL WELLNESS EXAM: Primary | ICD-10-CM

## 2024-10-31 DIAGNOSIS — Z82.49 FHX: EARLY CORONARY ARTERY DISEASE: ICD-10-CM

## 2024-10-31 DIAGNOSIS — I10 HYPERTENSION GOAL BP (BLOOD PRESSURE) < 140/90: ICD-10-CM

## 2024-10-31 PROBLEM — E11.9 DIABETES MELLITUS, TYPE 2 (H): Status: ACTIVE | Noted: 2024-10-31

## 2024-10-31 LAB
EST. AVERAGE GLUCOSE BLD GHB EST-MCNC: 166 MG/DL
HBA1C MFR BLD: 7.4 % (ref 0–5.6)

## 2024-10-31 PROCEDURE — 99214 OFFICE O/P EST MOD 30 MIN: CPT | Mod: 25 | Performed by: FAMILY MEDICINE

## 2024-10-31 PROCEDURE — 82570 ASSAY OF URINE CREATININE: CPT

## 2024-10-31 PROCEDURE — 80069 RENAL FUNCTION PANEL: CPT

## 2024-10-31 PROCEDURE — G0439 PPPS, SUBSEQ VISIT: HCPCS | Performed by: FAMILY MEDICINE

## 2024-10-31 PROCEDURE — 83036 HEMOGLOBIN GLYCOSYLATED A1C: CPT

## 2024-10-31 PROCEDURE — 82043 UR ALBUMIN QUANTITATIVE: CPT

## 2024-10-31 PROCEDURE — 36415 COLL VENOUS BLD VENIPUNCTURE: CPT

## 2024-10-31 RX ORDER — METFORMIN HYDROCHLORIDE 500 MG/1
TABLET, EXTENDED RELEASE ORAL
Qty: 180 TABLET | Refills: 1 | Status: SHIPPED | OUTPATIENT
Start: 2024-10-31

## 2024-10-31 RX ORDER — LOSARTAN POTASSIUM AND HYDROCHLOROTHIAZIDE 25; 100 MG/1; MG/1
1 TABLET ORAL DAILY
Qty: 90 TABLET | Refills: 1 | Status: SHIPPED | OUTPATIENT
Start: 2024-10-31

## 2024-10-31 NOTE — PATIENT INSTRUCTIONS
Patient Education   Preventive Care Advice   This is general advice given by our system to help you stay healthy. However, your care team may have specific advice just for you. Please talk to your care team about your preventive care needs.  Nutrition  Eat 5 or more servings of fruits and vegetables each day.  Try wheat bread, brown rice and whole grain pasta (instead of white bread, rice, and pasta).  Get enough calcium and vitamin D. Check the label on foods and aim for 100% of the RDA (recommended daily allowance).  Lifestyle  Exercise at least 150 minutes each week  (30 minutes a day, 5 days a week).  Do muscle strengthening activities 2 days a week. These help control your weight and prevent disease.  No smoking.  Wear sunscreen to prevent skin cancer.  Have a dental exam and cleaning every 6 months.  Yearly exams  See your health care team every year to talk about:  Any changes in your health.  Any medicines your care team has prescribed.  Preventive care, family planning, and ways to prevent chronic diseases.  Shots (vaccines)   HPV shots (up to age 26), if you've never had them before.  Hepatitis B shots (up to age 59), if you've never had them before.  COVID-19 shot: Get this shot when it's due.  Flu shot: Get a flu shot every year.  Tetanus shot: Get a tetanus shot every 10 years.  Pneumococcal, hepatitis A, and RSV shots: Ask your care team if you need these based on your risk.  Shingles shot (for age 50 and up)  General health tests  Diabetes screening:  Starting at age 35, Get screened for diabetes at least every 3 years.  If you are younger than age 35, ask your care team if you should be screened for diabetes.  Cholesterol test: At age 39, start having a cholesterol test every 5 years, or more often if advised.  Bone density scan (DEXA): At age 50, ask your care team if you should have this scan for osteoporosis (brittle bones).  Hepatitis C: Get tested at least once in your life.  STIs (sexually  transmitted infections)  Before age 24: Ask your care team if you should be screened for STIs.  After age 24: Get screened for STIs if you're at risk. You are at risk for STIs (including HIV) if:  You are sexually active with more than one person.  You don't use condoms every time.  You or a partner was diagnosed with a sexually transmitted infection.  If you are at risk for HIV, ask about PrEP medicine to prevent HIV.  Get tested for HIV at least once in your life, whether you are at risk for HIV or not.  Cancer screening tests  Cervical cancer screening: If you have a cervix, begin getting regular cervical cancer screening tests starting at age 21.  Breast cancer scan (mammogram): If you've ever had breasts, begin having regular mammograms starting at age 40. This is a scan to check for breast cancer.  Colon cancer screening: It is important to start screening for colon cancer at age 45.  Have a colonoscopy test every 10 years (or more often if you're at risk) Or, ask your provider about stool tests like a FIT test every year or Cologuard test every 3 years.  To learn more about your testing options, visit:   .  For help making a decision, visit:   https://bit.ly/lj54203.  Prostate cancer screening test: If you have a prostate, ask your care team if a prostate cancer screening test (PSA) at age 55 is right for you.  Lung cancer screening: If you are a current or former smoker ages 50 to 80, ask your care team if ongoing lung cancer screenings are right for you.  For informational purposes only. Not to replace the advice of your health care provider. Copyright   2023 [employer]. All rights reserved. Clinically reviewed by the [facility] Transitions Program. [employer] [id number] - REV 01/24.

## 2024-10-31 NOTE — PROGRESS NOTES
Preventive Care Visit  [facility]  Martin Marsh MD, Family Medicine  Oct 31, 2024      ASSESSMENT / PLAN:  (Z00.00) Encounter for Medicare annual wellness exam  (primary encounter diagnosis)  Comment: generally healthy and normal exam.   Plan: Reviewed self mole/testicle check handout.  vitaminD. Exercise.     (E11.69) Type 2 diabetes mellitus with other specified complication, unspecified whether long term insulin use (H)  Comment: worse and loose stoosl  Plan: empagliflozin (JARDIANCE) 10 MG TABS tablet,         metFORMIN (GLUCOPHAGE XR) 500 MG 24 hr tablet,         CT Coronary Calcium Scan        Will add jardiance and 1/2 metformin. Reveiwed risks and side effects of medication  Consider Januvia too. Exercise and weight loss. Recheck in 6 months  Call/email with questions/concerns      (I10) Hypertension goal BP (blood pressure) < 140/90  Comment: stable  Plan: losartan-hydrochlorothiazide (HYZAAR) 100-25 MG        tablet, CT Coronary Calcium Scan        Continue self-monitor. Add norvasc is worse    (Z82.49) FHx: early coronary artery disease  Comment: dad  Plan: CT Coronary Calcium Scan        Will check calcium scan. If moderate- increase lipitor. If severe - cardiology. Chest pain or shortness of breath to er.   Known lung nodule seen in past but non-smoker and no cough    Subjective   Rohit is a 69 year old, presenting for the following:  Wellness Visit (Pt mentions his metformin is making his stomach ill.)    Follow-up dm, htn and high cholesterol   No chest pain or shortness of breath.   Golfing and walking. Intermittent fasting did in past.   Dad with mi at 42yo.  No nausea, vomiting or diarrhea or black/bloody stools. No urine chnges or hematuria. No feet changes. Eye exam in spring -ok.   Dentist reguarly. Outside blood pressure blood pressure.  Memory ok. No fall.  , 2 kids in 3 grandkids. All in [place].   13 horses.   Live close.   No rashes/mole changes. Taking  Kay.  [employer] for winter on/off. Never smoking.  Memory doing ok. No falls. Good support system.          10/31/2024     9:09 AM   Additional Questions   Roomed by Rosy   Accompanied by self         10/31/2024     9:09 AM   Patient Reported Additional Medications   Patient reports taking the following new medications n/a         Via the Health Maintenance questionnaire, the patient has reported the following services have been completed -Eye Exam: [facility] 2024-05-17, this information has been sent to the abstraction team.    Health Care Directive  Patient does not have a Health Care Directive: Discussed advance care planning with patient; however, patient declined at this time.      10/30/2024   General Health   How would you rate your overall physical health? Good   Feel stress (tense, anxious, or unable to sleep) Not at all            10/30/2024   Nutrition   Diet: Regular (no restrictions)            10/30/2024   Exercise   Days per week of moderate/strenous exercise 1 day   Average minutes spent exercising at this level 20 min      (!) EXERCISE CONCERN      10/30/2024   Social Factors   Frequency of gathering with friends or relatives More than three times a week   Worry food won't last until get money to buy more No   Food not last or not have enough money for food? No   Do you have housing? (Housing is defined as stable permanent housing and does not include staying ouside in a car, in a tent, in an abandoned building, in an overnight shelter, or couch-surfing.) Yes   Are you worried about losing your housing? No   Lack of transportation? No   Unable to get utilities (heat,electricity)? No            10/30/2024   Fall Risk   Fallen 2 or more times in the past year? No     No    Trouble with walking or balance? No     No        Patient-reported    Multiple values from one day are sorted in reverse-chronological order          10/30/2024   Activities of Daily Living- Home Safety   Needs help  with the following daily activites None of the above   Safety concerns in the home None of the above            10/30/2024   Dental   Dentist two times every year? Yes            10/30/2024   Hearing Screening   Hearing concerns? None of the above            10/30/2024   Driving Risk Screening   Patient/family members have concerns about driving No            10/30/2024   General Alertness/Fatigue Screening   Have you been more tired than usual lately? No            10/30/2024   Urinary Incontinence Screening   Bothered by leaking urine in past 6 months No            10/30/2024   TB Screening   Were you born outside of the [place]? No            Today's PHQ-2 Score:       10/30/2024    11:54 AM   PHQ-2 ( 1999 [employer])   Q1: Little interest or pleasure in doing things 0    Q2: Feeling down, depressed or hopeless 0    PHQ-2 Score 0    Q1: Little interest or pleasure in doing things Not at all   Q2: Feeling down, depressed or hopeless Not at all   PHQ-2 Score 0       Patient-reported           10/30/2024   Substance Use   Alcohol more than 3/day or more than 7/wk Not Applicable   Do you have a current opioid prescription? No   How severe/bad is pain from 1 to 10? 1/10   Do you use any other substances recreationally? No        Social History     Tobacco Use    Smoking status: Never    Smokeless tobacco: Never   Vaping Use    Vaping status: Never Used   Substance Use Topics    Alcohol use: Yes    Drug use: Not Currently     Types: Marijuana           10/30/2024   AAA Screening   Family history of Abdominal Aortic Aneurysm (AAA)? No      ASCVD Risk   The 10-year ASCVD risk score (Javier SAPP, et al., 2019) is: 50.8%    Values used to calculate the score:      Age: 69 years      Sex: Male      Is Non- : No      Diabetic: Yes      Tobacco smoker: No      Systolic Blood Pressure: 162 mmHg      Is BP treated: Yes      HDL Cholesterol: 29 mg/dL      Total Cholesterol: 130 mg/dL            Reviewed and  updated as needed this visit by Provider                      Current providers sharing in care for this patient include:  Patient Care Team:  Martin Marsh MD as PCP - General (Family Medicine)  Martin Marsh MD as Assigned PCP  Greta Shah as Diabetes Educator    The following health maintenance items are reviewed in Epic and correct as of today:  Health Maintenance   Topic Date Due    DIABETIC FOOT EXAM  Never done    ANNUAL REVIEW OF HM ORDERS  Never done    Pneumococcal Vaccine: 65+ Years (1 of 2 - PCV) Never done    HEPATITIS C SCREENING  Never done    DTAP/TDAP/TD IMMUNIZATION (1 - Tdap) Never done    ZOSTER IMMUNIZATION (1 of 2) Never done    RSV VACCINE (1 - Risk 60-74 years 1-dose series) Never done    EYE EXAM  03/05/2024    INFLUENZA VACCINE (1) 09/01/2024    COVID-19 Vaccine (1 - 2024-25 season) Never done    MICROALBUMIN  09/05/2024    A1C  01/31/2025    BMP  04/04/2025    LIPID  04/04/2025    MEDICARE ANNUAL WELLNESS VISIT  10/31/2025    FALL RISK ASSESSMENT  10/31/2025    COLORECTAL CANCER SCREENING  12/07/2027    ADVANCE CARE PLANNING  09/12/2028    PHQ-2 (once per calendar year)  Completed    HPV IMMUNIZATION  Aged Out    MENINGITIS IMMUNIZATION  Aged Out    RSV MONOCLONAL ANTIBODY  Aged Out            Objective    Exam  /77   Pulse 92   Temp 97.9  F (36.6  C) (Tympanic)   Resp 16   Ht 1.829 m (6')   Wt 104.8 kg (231 lb)   SpO2 97%   BMI 31.33 kg/m        Physical Exam  GENERAL: alert and no distress  EYES: Eyes grossly normal to inspection, PERRL and conjunctivae and sclerae normal  HENT: ear canals and TM's normal, nose and mouth without ulcers or lesions  NECK: no adenopathy, no asymmetry, masses, or scars  RESP: lungs clear to auscultation - no rales, rhonchi or wheezes  BREAST: normal without masses, tenderness or nipple discharge and no palpable axillary masses or adenopathy  CV: regular rate and rhythm, normal S1 S2, no S3 or S4, no murmur, click or rub,  no peripheral edema   ABDOMEN: soft, nontender, no hepatosplenomegaly, no masses and bowel sounds normal  MS: no gross musculoskeletal defects noted, no edema  SKIN: no suspicious lesions or rashes  NEURO: Normal strength and tone, mentation intact and speech normal  PSYCH: mentation appears normal, affect normal/bright  LYMPH: no cervical, supraclavicular, axillary adenopathy         10/31/2024   Mini Cog   Clock Draw Score 2 Normal   3 Item Recall 3 objects recalled   Mini Cog Total Score 5                 Signed Electronically by: Martin Marsh MD

## 2024-11-01 LAB
ALBUMIN SERPL BCG-MCNC: 4.5 G/DL (ref 3.5–5.2)
ANION GAP SERPL CALCULATED.3IONS-SCNC: 17 MMOL/L (ref 7–15)
BUN SERPL-MCNC: 20.2 MG/DL (ref 8–23)
CALCIUM SERPL-MCNC: 9.6 MG/DL (ref 8.8–10.4)
CHLORIDE SERPL-SCNC: 99 MMOL/L (ref 98–107)
CREAT SERPL-MCNC: 0.89 MG/DL (ref 0.67–1.17)
CREAT UR-MCNC: 175.8 MG/DL
EGFRCR SERPLBLD CKD-EPI 2021: >90 ML/MIN/1.73M2
GLUCOSE SERPL-MCNC: 188 MG/DL (ref 70–99)
HCO3 SERPL-SCNC: 23 MMOL/L (ref 22–29)
MICROALBUMIN UR-MCNC: 19.6 MG/L
MICROALBUMIN/CREAT UR: 11.15 MG/G CR (ref 0–17)
PHOSPHATE SERPL-MCNC: 4.2 MG/DL (ref 2.5–4.5)
POTASSIUM SERPL-SCNC: 5 MMOL/L (ref 3.4–5.3)
SODIUM SERPL-SCNC: 139 MMOL/L (ref 135–145)

## 2024-11-22 ENCOUNTER — ANCILLARY ORDERS (OUTPATIENT)
Dept: FAMILY MEDICINE | Facility: CLINIC | Age: 69
End: 2024-11-22

## 2024-11-22 DIAGNOSIS — Z82.49 FHX: EARLY CORONARY ARTERY DISEASE: ICD-10-CM

## 2024-11-22 DIAGNOSIS — I10 HYPERTENSION GOAL BP (BLOOD PRESSURE) < 140/90: ICD-10-CM

## 2024-11-22 DIAGNOSIS — E11.69 TYPE 2 DIABETES MELLITUS WITH OTHER SPECIFIED COMPLICATION, UNSPECIFIED WHETHER LONG TERM INSULIN USE (H): Primary | ICD-10-CM

## 2024-12-09 ENCOUNTER — ANCILLARY PROCEDURE (OUTPATIENT)
Dept: CT IMAGING | Facility: CLINIC | Age: 69
End: 2024-12-09
Attending: FAMILY MEDICINE
Payer: COMMERCIAL

## 2024-12-09 DIAGNOSIS — I10 HYPERTENSION GOAL BP (BLOOD PRESSURE) < 140/90: ICD-10-CM

## 2024-12-09 DIAGNOSIS — E11.69 TYPE 2 DIABETES MELLITUS WITH OTHER SPECIFIED COMPLICATION, UNSPECIFIED WHETHER LONG TERM INSULIN USE (H): ICD-10-CM

## 2024-12-09 DIAGNOSIS — Z82.49 FHX: EARLY CORONARY ARTERY DISEASE: ICD-10-CM

## 2024-12-09 PROCEDURE — 75571 CT HRT W/O DYE W/CA TEST: CPT | Performed by: INTERNAL MEDICINE

## 2024-12-10 ENCOUNTER — OFFICE VISIT (OUTPATIENT)
Dept: CARDIOLOGY | Facility: CLINIC | Age: 69
End: 2024-12-10
Attending: FAMILY MEDICINE
Payer: COMMERCIAL

## 2024-12-10 VITALS
SYSTOLIC BLOOD PRESSURE: 123 MMHG | HEART RATE: 94 BPM | BODY MASS INDEX: 30.75 KG/M2 | WEIGHT: 227 LBS | DIASTOLIC BLOOD PRESSURE: 81 MMHG | OXYGEN SATURATION: 97 % | HEIGHT: 72 IN

## 2024-12-10 DIAGNOSIS — E78.2 MIXED HYPERLIPIDEMIA: Primary | ICD-10-CM

## 2024-12-10 DIAGNOSIS — Z82.49 FHX: EARLY CORONARY ARTERY DISEASE: ICD-10-CM

## 2024-12-10 DIAGNOSIS — E11.69 TYPE 2 DIABETES MELLITUS WITH OTHER SPECIFIED COMPLICATION, UNSPECIFIED WHETHER LONG TERM INSULIN USE (H): ICD-10-CM

## 2024-12-10 DIAGNOSIS — I20.89 OTHER FORMS OF ANGINA PECTORIS (H): ICD-10-CM

## 2024-12-10 DIAGNOSIS — I10 HYPERTENSION GOAL BP (BLOOD PRESSURE) < 140/90: ICD-10-CM

## 2024-12-10 NOTE — NURSING NOTE
Chief Complaint   Patient presents with    New Patient     Type 2 diabetes mellitus with other specified complication, unspecified whether long term insulin use (H)  and Hypertension goal BP (blood pressure) < 140/90       Initial /81 (BP Location: Right arm, Patient Position: Chair, Cuff Size: Adult Regular)   Pulse 94   Ht 1.829 m (6')   Wt 103 kg (227 lb)   SpO2 97%   BMI 30.79 kg/m   Estimated body mass index is 30.79 kg/m  as calculated from the following:    Height as of this encounter: 1.829 m (6').    Weight as of this encounter: 103 kg (227 lb)..  BP completed using cuff size: regular    Jessa YOUNG CNA

## 2024-12-10 NOTE — LETTER
"12/10/2024      RE: Rohit William  [address] [facility] [address]       Dear Colleague,    Thank you for the opportunity to participate in the care of your patient, Rohit William, at the [facility]. Please see a copy of my visit note below.                                                                                                                                                                                                   [facility]      REFERRING PROVIDER:  Martin Marsh MD    DATE OF VISIT:  December 10, 2024    REASON FOR VISIT:  Evaluation regarding severe coronary calcification with a high total calcium score on coronary CT calcium scoring study.    HISTORY OF PRESENT ILLNESS:   Mr. Rohit William is a 69 year old  male with past medical history significant for hypertension, dyslipoproteinemia, diabetes mellitus, and lung nodule.    He recently underwent elective coronary CT calcium scoring study on 12/9/2024 for \"reassurance\" per patient report.  This revealed severe coronary calcification with a total calcium score of 3869 and also dilated ascending aorta measuring 4.5 x 4.4 cm.  He was therefore referred to the cardiology clinic for evaluation and management recommendations.    Other than subtle reduced physical stamina and activity endurance over the past few years, he otherwise denies recent significant symptoms referable to the cardiovascular system.  In particular, no recent history of chest pain, shortness of breath, or dyspnea on activities of daily living.  No recent history of significant ankle edema, orthopnea, or paroxysmal nocturnal dyspnea.  No subjective awareness of irregular heart rhythm or rapid heart pulsation.  No recent history of severe lightheadedness, syncope or near syncope.  Overall, he reports no limitation in terms of his overall physical stamina and " activity endurance of daily living.  He has no particular complaint.    Medications, personal, family, and social history reviewed.    PAST MEDICAL HISTORY:  Past Medical History:   Diagnosis Date    Dyslipoproteinemia      Diabetes (H)      Hypertension      PAST SURGICAL HISTORY:  Past Surgical History:   Procedure Laterality Date     COLONOSCOPY N/A 12/07/2022    Procedure: COLONOSCOPY, FLEXIBLE, WITH LESION REMOVAL USING SNARE;  Surgeon: Gomez Zurita MD;  Location: [facility] OR     COLONOSCOPY WITH CO2 INSUFFLATION N/A 12/07/2022    Procedure: COLONOSCOPY, WITH CO2 INSUFFLATION;  Surgeon: Gomez Zurita MD;  Location: [facility] OR     ORTHOPEDIC SURGERY  2003     CURRENT MEDICATIONS:  Current Outpatient Medications   Medication Sig Dispense Refill     aspirin (ASA) 81 MG tablet Take 1 tablet (81 mg) by mouth daily 1 tablet 3     atorvastatin (LIPITOR) 10 MG tablet Take 1 tablet (10 mg) by mouth daily or cholesterol/prevents hearts attacks/strokes 90 tablet 3     blood glucose (NO BRAND SPECIFIED) lancets standard Use to test blood sugar one time daily or as directed. 100 each 3     blood glucose (ONETOUCH ULTRA) test strip USE TO TEST BLOOD SUGAR ONE TIME DAILY OR AS DIRECTED. 100 strip 1     blood glucose monitoring (ONE TOUCH ULTRA 2) meter device kit Use to test blood sugar one time daily or as directed. 1 kit 0     empagliflozin (JARDIANCE) 10 MG TABS tablet Take 1 tablet (10 mg) by mouth daily. New for diabetes/ helps with weight loss 30 tablet 5     losartan-hydrochlorothiazide (HYZAAR) 100-25 MG tablet Take 1 tablet by mouth daily. For blood pressure in AM. This is a combination pill of cozaar and hydrochlorothiazide. Finish individual pills first 90 tablet 1     metFORMIN (GLUCOPHAGE XR) 500 MG 24 hr tablet TAKE 1 TAB  BY MOUTH DAILY (WITH BREAKFAST) AND 1 TAB DAILY (WITH DINNER). This is half dosage 180 tablet 1     bisacodyl (DULCOLAX) 5 MG EC tablet Take 2 tablets at 3 pm the day before  your procedure. If your procedure is before 11 am, take 2 additional tablets at 11 pm. If your procedure is after 11 am, take 2 additional tablets at 6 am. For additional instructions refer to your colonoscopy prep instructions. (Patient not taking: Reported on 12/1/2022) 4 tablet 0     polyethylene glycol (GOLYTELY) 236 g suspension The night before the exam at 6 pm drink an 8-ounce glass every 15 minutes until the jug is half empty. If you arrive before 11 AM: Drink the other half of the Golytely jug at 11 PM night before procedure. If you arrive after 11 AM: Drink the other half of the Golytely jug at 6 AM day of procedure. For additional instructions refer to your colonoscopy prep instructions. (Patient not taking: Reported on 12/1/2022) 4000 mL 0     ALLERGIES:  No Known Allergies    FAMILY HISTORY:  Family History   Problem Relation Age of Onset     Alzheimer Disease Mother      Heart Disease Father MI at 41 years of age     Coronary Artery Disease Father      Diabetes Sister      Coronary Artery Disease Brother      Diabetes Brother      SOCIAL HISTORY:  Tobacco Use     Smoking status: Never     Smokeless tobacco: Never     Tobacco comments:     Occ. cigar    Vaping Use     Vaping status: Never Used   Substance Use Topics     Alcohol use: Yes     Drug use: Not Currently     Types: Marijuana     REVIEW OF SYSTEMS:  Other than as stated under history of present illness and/or past medical history, comprehensive review of other systems was performed and was unremarkable.    PHYSICAL EXAMINATION:  /81 (BP Location: Right arm, Patient Position: Chair, Cuff Size: Adult Regular)   Pulse 94   Ht 1.829 m (6')   Wt 103 kg (227 lb)   SpO2 97%   BMI 30.79 kg/m    GENERAL APPEARANCE: alert and in no acute distress.  Moderately severe overweight.  HEENT: no icterus, no central cyanosis  LYMPH/NECK: no adenopathy, no asymmetry, no appreciable neck mass.  RESPIRATORY: lungs clear to auscultation - no rales,  rhonchi or wheezes, no use of accessory muscles, no retractions, respirations are unlabored, normal respiratory rate  CARDIOVASCULAR: Auscultation reveals normal heart sounds.  There is a grade 1/6 systolic murmur at the apex.  There is no diastolic murmur.  GI: soft, non tender.  Obese abdomen and therefore difficult to evaluate for hepatosplenomegaly.  EXTREMITIES: No significant ankle edema.  NEURO: alert, normal speech,and affect  SKIN: no ecchymoses, no rashes    I have reviewed the labs and imaging results below and made my comment in the assessment and plan.    DIAGNOSTIC DATA:  BMP RESULTS:  Lab Results   Component Value Date     10/31/2024    POTASSIUM 5.0 10/31/2024    POTASSIUM 3.8 12/01/2022    CHLORIDE 99 10/31/2024    CHLORIDE 105 12/01/2022    CO2 23 10/31/2024    CO2 23 12/01/2022    ANIONGAP 17 (H) 10/31/2024    ANIONGAP 10 12/01/2022     (H) 10/31/2024     (H) 12/07/2022     (H) 12/01/2022    BUN 20.2 10/31/2024    BUN 18 12/01/2022    CR 0.89 10/31/2024    GFRESTIMATED >90 10/31/2024    MAK 9.6 10/31/2024     Lipid profile obtained on 4/4/2024 documented total cholesterol of 130, HDL 29, LDL 55, triglycerides 230.  Prior lipid study obtained on 12/1/2022 documented a total cholesterol 148, HDL 30, LDL 80, triglyceride 192.    Electrocardiogram  Twelve-lead electrocardiogram obtained in the clinic on 12/10/2024 documented underlying normal sinus rhythm with rate of 83 bpm.  There was normal IN interval of 154 ms.  There was normal QRS duration of 78 ms.  QT and QTc intervals were measured 360 and 423 ms respectively.  There was inferior MI of undetermined age.  There was moderate voltage criteria for LVH.    Coronary CT calcium scoring study  Coronary CT calcium scoring study performed on 12/9/2024 was reported to show severe coronary calcification with a total calcium score of 3869 or the 98th percentile for age and sex consisting of a score of 144 in the left main  territory, 1532 in the LAD, 763 in left circumflex, and 1433 in RCA.  There was also finding of dilated ascending aorta measuring 4.5 x 4.4 cm.    ASSESSMENT AND PLAN:   He apparently has a 10-year ASCVD risk calculated at 32% which would be elevated to 51.8% with the incorporation of a total calcium score of 3869.  There was also incidental finding of enlarged ascending aorta measuring 4.5 x 4.4 cm in diameter.  In view of his significant cardiovascular risk features, it would be prudent to proceed with coronary CT angiography to rule out significant obstructive coronary artery disease as having been contributory to his subtle progressive reduced physical stamina and activity endurance over the last few years.  Another objective of the coronary CT angiography would be to more accurately delineate size of the dilated ascending aorta.  Similarly, a baseline echocardiography study would be beneficial to document current status of cardiac size and function and also to document baseline anatomical features of the aortic root and aortic valve as a reference for future surveillance monitoring.  Regarding the finding of moderate hypertriglyceridemia on lipid profile obtained on 4/4/2024, I discussed with the patient the benefit of lifestyle modification with particular attention to limiting carbohydrate consumption.  If there is persistent significantly elevated triglyceride following a period of lifestyle modification and limitation of carbohydrate consumption, the addition of triglyceride lowering medication such as marine omega-3 fatty acid or fenofibrate can be considered.  I reviewed with the patient in the presence of his wife who accompanied him to the clinic the rationale for the recommendations.  I also pointed out to him that it would be possible that the severe coronary calcification could potentially compromise accurate evaluation of obstructive coronary artery disease in which case he may require an invasive  coronary angiography.  At the conclusion of the clinic encounter, both the patient and his wife appeared to have a reasonable understanding of our discussion and stated no questions regarding the rationale for the above-stated management strategy.  He elected to proceed as recommended.  The patient will be scheduled for a baseline transthoracic echocardiography study followed by a coronary CT angiography in the near future.  He will return to the cardiology clinic for follow-up in about 3 months, sooner if there is development of new symptoms and or depending on the results of the coronary CT angiography and echocardiography.  Prior to the clinic follow-up, a fasting lipid profile will be repeated to evaluate if the addition of triglyceride lowering medication should be considered.    Total time spent today for this visit is 35 minutes including precharting, face-to-face clinic visit, review of labs/imaging and medical documentation.    Lucina Xie MD, FACC, FAHA, FHRS, CCDS  Cardiologist    (This medical documentation was transcribed using voice recognition technology and therefore will be susceptible to unintentional transcribing errors and/or omissions)        Please do not hesitate to contact me if you have any questions/concerns.     Sincerely,     Lucina Xie MD

## 2024-12-10 NOTE — PATIENT INSTRUCTIONS
Thank you for coming to the [facility] @ [place]; please note the following instructions:    1. Repeat a fasting lipid lab  2. CT Angiogram coronary artery  3. Echocardiogram   4. Lifestyle modifications, diet change to improve triglycerides   5. Follow up with cardiology        If you have any questions regarding your visit please contact your care team:     Cardiology  Telephone Number   Fabby MERCADO., RN  Rima MAYER, RN  Karina SANTIAGO, RN  Hiral MENDOZA, RMA  Dannielle SMITH, ROSS CID, Visit Facilitator  Priscilla MEJIA [facility] [phone] (option 1)   For scheduling appts:     [phone] (select option 1)       For the Device Clinic (Pacemakers and ICD's)  RN's :  Maryana Dunne   During business hours: [phone]    *After business hours:  [phone] (select option 4)      Normal test result notifications will be released via "[employer] or mailed within 7 business days.  All other test results, will be communicated via telephone once reviewed by your cardiologist.    If you need a medication refill please contact your pharmacy.  Please allow 3 business days for your refill to be completed.    As always, thank you for trusting us with your health care needs!

## 2024-12-11 LAB
ATRIAL RATE - MUSE: 83 BPM
DIASTOLIC BLOOD PRESSURE - MUSE: NORMAL MMHG
INTERPRETATION ECG - MUSE: NORMAL
P AXIS - MUSE: 17 DEGREES
PR INTERVAL - MUSE: 154 MS
QRS DURATION - MUSE: 78 MS
QT - MUSE: 360 MS
QTC - MUSE: 423 MS
R AXIS - MUSE: -9 DEGREES
SYSTOLIC BLOOD PRESSURE - MUSE: NORMAL MMHG
T AXIS - MUSE: 2 DEGREES
VENTRICULAR RATE- MUSE: 83 BPM

## 2024-12-11 NOTE — PROGRESS NOTES
"                                                                                                                                                                                               [facility]      REFERRING PROVIDER:  Martin Marsh MD    DATE OF VISIT:  December 10, 2024    REASON FOR VISIT:  Evaluation regarding severe coronary calcification with a high total calcium score on coronary CT calcium scoring study.    HISTORY OF PRESENT ILLNESS:   Mr. Rohit William is a 69 year old  male with past medical history significant for hypertension, dyslipoproteinemia, diabetes mellitus, and lung nodule.    He recently underwent elective coronary CT calcium scoring study on 12/9/2024 for \"reassurance\" per patient report.  This revealed severe coronary calcification with a total calcium score of 3869 and also dilated ascending aorta measuring 4.5 x 4.4 cm.  He was therefore referred to the cardiology clinic for evaluation and management recommendations.    Other than subtle reduced physical stamina and activity endurance over the past few years, he otherwise denies recent significant symptoms referable to the cardiovascular system.  In particular, no recent history of chest pain, shortness of breath, or dyspnea on activities of daily living.  No recent history of significant ankle edema, orthopnea, or paroxysmal nocturnal dyspnea.  No subjective awareness of irregular heart rhythm or rapid heart pulsation.  No recent history of severe lightheadedness, syncope or near syncope.  Overall, he reports no limitation in terms of his overall physical stamina and activity endurance of daily living.  He has no particular complaint.    Medications, personal, family, and social history reviewed.    PAST MEDICAL HISTORY:  Past Medical History:   Diagnosis Date    Dyslipoproteinemia     Diabetes (H)     Hypertension      PAST SURGICAL HISTORY:  Past Surgical History:   Procedure Laterality Date    " COLONOSCOPY N/A 12/07/2022    Procedure: COLONOSCOPY, FLEXIBLE, WITH LESION REMOVAL USING SNARE;  Surgeon: Gomez Zurita MD;  Location: [facility] OR    COLONOSCOPY WITH CO2 INSUFFLATION N/A 12/07/2022    Procedure: COLONOSCOPY, WITH CO2 INSUFFLATION;  Surgeon: Gomez Zurita MD;  Location: [facility] OR    ORTHOPEDIC SURGERY  2003     CURRENT MEDICATIONS:  Current Outpatient Medications   Medication Sig Dispense Refill    aspirin (ASA) 81 MG tablet Take 1 tablet (81 mg) by mouth daily 1 tablet 3    atorvastatin (LIPITOR) 10 MG tablet Take 1 tablet (10 mg) by mouth daily or cholesterol/prevents hearts attacks/strokes 90 tablet 3    blood glucose (NO BRAND SPECIFIED) lancets standard Use to test blood sugar one time daily or as directed. 100 each 3    blood glucose (ONETOUCH ULTRA) test strip USE TO TEST BLOOD SUGAR ONE TIME DAILY OR AS DIRECTED. 100 strip 1    blood glucose monitoring (ONE TOUCH ULTRA 2) meter device kit Use to test blood sugar one time daily or as directed. 1 kit 0    empagliflozin (JARDIANCE) 10 MG TABS tablet Take 1 tablet (10 mg) by mouth daily. New for diabetes/ helps with weight loss 30 tablet 5    losartan-hydrochlorothiazide (HYZAAR) 100-25 MG tablet Take 1 tablet by mouth daily. For blood pressure in AM. This is a combination pill of cozaar and hydrochlorothiazide. Finish individual pills first 90 tablet 1    metFORMIN (GLUCOPHAGE XR) 500 MG 24 hr tablet TAKE 1 TAB  BY MOUTH DAILY (WITH BREAKFAST) AND 1 TAB DAILY (WITH DINNER). This is half dosage 180 tablet 1    bisacodyl (DULCOLAX) 5 MG EC tablet Take 2 tablets at 3 pm the day before your procedure. If your procedure is before 11 am, take 2 additional tablets at 11 pm. If your procedure is after 11 am, take 2 additional tablets at 6 am. For additional instructions refer to your colonoscopy prep instructions. (Patient not taking: Reported on 12/1/2022) 4 tablet 0    polyethylene glycol (GOLYTELY) 236 g suspension The night  before the exam at 6 pm drink an 8-ounce glass every 15 minutes until the jug is half empty. If you arrive before 11 AM: Drink the other half of the Golytely jug at 11 PM night before procedure. If you arrive after 11 AM: Drink the other half of the Golytely jug at 6 AM day of procedure. For additional instructions refer to your colonoscopy prep instructions. (Patient not taking: Reported on 12/1/2022) 4000 mL 0     ALLERGIES:  No Known Allergies    FAMILY HISTORY:  Family History   Problem Relation Age of Onset    Alzheimer Disease Mother     Heart Disease Father MI at 41 years of age    Coronary Artery Disease Father     Diabetes Sister     Coronary Artery Disease Brother     Diabetes Brother      SOCIAL HISTORY:  Tobacco Use    Smoking status: Never    Smokeless tobacco: Never    Tobacco comments:     Occ. cigar    Vaping Use    Vaping status: Never Used   Substance Use Topics    Alcohol use: Yes    Drug use: Not Currently     Types: Marijuana     REVIEW OF SYSTEMS:  Other than as stated under history of present illness and/or past medical history, comprehensive review of other systems was performed and was unremarkable.    PHYSICAL EXAMINATION:  /81 (BP Location: Right arm, Patient Position: Chair, Cuff Size: Adult Regular)   Pulse 94   Ht 1.829 m (6')   Wt 103 kg (227 lb)   SpO2 97%   BMI 30.79 kg/m    GENERAL APPEARANCE: alert and in no acute distress.  Moderately severe overweight.  HEENT: no icterus, no central cyanosis  LYMPH/NECK: no adenopathy, no asymmetry, no appreciable neck mass.  RESPIRATORY: lungs clear to auscultation - no rales, rhonchi or wheezes, no use of accessory muscles, no retractions, respirations are unlabored, normal respiratory rate  CARDIOVASCULAR: Auscultation reveals normal heart sounds.  There is a grade 1/6 systolic murmur at the apex.  There is no diastolic murmur.  GI: soft, non tender.  Obese abdomen and therefore difficult to evaluate for  hepatosplenomegaly.  EXTREMITIES: No significant ankle edema.  NEURO: alert, normal speech,and affect  SKIN: no ecchymoses, no rashes    I have reviewed the labs and imaging results below and made my comment in the assessment and plan.    DIAGNOSTIC DATA:  BMP RESULTS:  Lab Results   Component Value Date     10/31/2024    POTASSIUM 5.0 10/31/2024    POTASSIUM 3.8 12/01/2022    CHLORIDE 99 10/31/2024    CHLORIDE 105 12/01/2022    CO2 23 10/31/2024    CO2 23 12/01/2022    ANIONGAP 17 (H) 10/31/2024    ANIONGAP 10 12/01/2022     (H) 10/31/2024     (H) 12/07/2022     (H) 12/01/2022    BUN 20.2 10/31/2024    BUN 18 12/01/2022    CR 0.89 10/31/2024    GFRESTIMATED >90 10/31/2024    MAK 9.6 10/31/2024     Lipid profile obtained on 4/4/2024 documented total cholesterol of 130, HDL 29, LDL 55, triglycerides 230.  Prior lipid study obtained on 12/1/2022 documented a total cholesterol 148, HDL 30, LDL 80, triglyceride 192.    Electrocardiogram  Twelve-lead electrocardiogram obtained in the clinic on 12/10/2024 documented underlying normal sinus rhythm with rate of 83 bpm.  There was normal OH interval of 154 ms.  There was normal QRS duration of 78 ms.  QT and QTc intervals were measured 360 and 423 ms respectively.  There was inferior MI of undetermined age.  There was moderate voltage criteria for LVH.    Coronary CT calcium scoring study  Coronary CT calcium scoring study performed on 12/9/2024 was reported to show severe coronary calcification with a total calcium score of 3869 or the 98th percentile for age and sex consisting of a score of 144 in the left main territory, 1532 in the LAD, 763 in left circumflex, and 1433 in RCA.  There was also finding of dilated ascending aorta measuring 4.5 x 4.4 cm.    ASSESSMENT AND PLAN:   He apparently has a 10-year ASCVD risk calculated at 32% which would be elevated to 51.8% with the incorporation of a total calcium score of 3869.  There was also  incidental finding of enlarged ascending aorta measuring 4.5 x 4.4 cm in diameter.  In view of his significant cardiovascular risk features, it would be prudent to proceed with coronary CT angiography to rule out significant obstructive coronary artery disease as having been contributory to his subtle progressive reduced physical stamina and activity endurance over the last few years.  Another objective of the coronary CT angiography would be to more accurately delineate size of the dilated ascending aorta.  Similarly, a baseline echocardiography study would be beneficial to document current status of cardiac size and function and also to document baseline anatomical features of the aortic root and aortic valve as a reference for future surveillance monitoring.  Regarding the finding of moderate hypertriglyceridemia on lipid profile obtained on 4/4/2024, I discussed with the patient the benefit of lifestyle modification with particular attention to limiting carbohydrate consumption.  If there is persistent significantly elevated triglyceride following a period of lifestyle modification and limitation of carbohydrate consumption, the addition of triglyceride lowering medication such as marine omega-3 fatty acid or fenofibrate can be considered.  I reviewed with the patient in the presence of his wife who accompanied him to the clinic the rationale for the recommendations.  I also pointed out to him that it would be possible that the severe coronary calcification could potentially compromise accurate evaluation of obstructive coronary artery disease in which case he may require an invasive coronary angiography.  At the conclusion of the clinic encounter, both the patient and his wife appeared to have a reasonable understanding of our discussion and stated no questions regarding the rationale for the above-stated management strategy.  He elected to proceed as recommended.  The patient will be scheduled for a baseline  transthoracic echocardiography study followed by a coronary CT angiography in the near future.  He will return to the cardiology clinic for follow-up in about 3 months, sooner if there is development of new symptoms and or depending on the results of the coronary CT angiography and echocardiography.  Prior to the clinic follow-up, a fasting lipid profile will be repeated to evaluate if the addition of triglyceride lowering medication should be considered.    Total time spent today for this visit is 35 minutes including precharting, face-to-face clinic visit, review of labs/imaging and medical documentation.    Lucina Xie MD, FACC, FAHA, FHRS, CCDS  Cardiologist    (This medical documentation was transcribed using voice recognition technology and therefore will be susceptible to unintentional transcribing errors and/or omissions)

## 2024-12-12 NOTE — NURSING NOTE
To Do    1. Repeat a fasting lipid lab  2. CT Angiogram coronary artery  3. Echocardiogram   4. Lifestyle modifications, diet change to improve triglycerides   5. Follow up with cardiology    Karina Busch RN

## 2025-01-02 ENCOUNTER — LAB (OUTPATIENT)
Dept: LAB | Facility: CLINIC | Age: 70
End: 2025-01-02
Payer: COMMERCIAL

## 2025-01-02 ENCOUNTER — ANCILLARY PROCEDURE (OUTPATIENT)
Dept: CARDIOLOGY | Facility: CLINIC | Age: 70
End: 2025-01-02
Attending: INTERNAL MEDICINE
Payer: COMMERCIAL

## 2025-01-02 DIAGNOSIS — E11.69 TYPE 2 DIABETES MELLITUS WITH OTHER SPECIFIED COMPLICATION, UNSPECIFIED WHETHER LONG TERM INSULIN USE (H): ICD-10-CM

## 2025-01-02 DIAGNOSIS — E78.2 MIXED HYPERLIPIDEMIA: ICD-10-CM

## 2025-01-02 DIAGNOSIS — Z82.49 FHX: EARLY CORONARY ARTERY DISEASE: ICD-10-CM

## 2025-01-02 DIAGNOSIS — I10 HYPERTENSION GOAL BP (BLOOD PRESSURE) < 140/90: ICD-10-CM

## 2025-01-02 DIAGNOSIS — I20.89 OTHER FORMS OF ANGINA PECTORIS: ICD-10-CM

## 2025-01-02 LAB
BI-PLANE LVEF ECHO: NORMAL
CHOLEST SERPL-MCNC: 139 MG/DL
FASTING STATUS PATIENT QL REPORTED: YES
HDLC SERPL-MCNC: 27 MG/DL
LDLC SERPL CALC-MCNC: 61 MG/DL
LVEF ECHO: NORMAL
NONHDLC SERPL-MCNC: 112 MG/DL
TRIGL SERPL-MCNC: 256 MG/DL

## 2025-01-02 PROCEDURE — 93306 TTE W/DOPPLER COMPLETE: CPT | Performed by: INTERNAL MEDICINE

## 2025-01-02 RX ADMIN — Medication 10 ML: at 09:05

## 2025-01-30 ENCOUNTER — ANCILLARY ORDERS (OUTPATIENT)
Dept: CARDIOLOGY | Facility: CLINIC | Age: 70
End: 2025-01-30

## 2025-01-30 DIAGNOSIS — I20.89 OTHER FORMS OF ANGINA PECTORIS: ICD-10-CM

## 2025-01-30 DIAGNOSIS — E78.2 MIXED HYPERLIPIDEMIA: ICD-10-CM

## 2025-01-30 DIAGNOSIS — I10 HYPERTENSION GOAL BP (BLOOD PRESSURE) < 140/90: ICD-10-CM

## 2025-01-30 DIAGNOSIS — Z82.49 FHX: EARLY CORONARY ARTERY DISEASE: ICD-10-CM

## 2025-01-30 DIAGNOSIS — E11.69 TYPE 2 DIABETES MELLITUS WITH OTHER SPECIFIED COMPLICATION, UNSPECIFIED WHETHER LONG TERM INSULIN USE (H): Primary | ICD-10-CM

## 2025-01-31 ENCOUNTER — HOSPITAL ENCOUNTER (OUTPATIENT)
Dept: CT IMAGING | Facility: CLINIC | Age: 70
Discharge: HOME | End: 2025-01-31
Attending: INTERNAL MEDICINE | Admitting: INTERNAL MEDICINE
Payer: COMMERCIAL

## 2025-01-31 VITALS
HEART RATE: 55 BPM | RESPIRATION RATE: 16 BRPM | OXYGEN SATURATION: 99 % | DIASTOLIC BLOOD PRESSURE: 74 MMHG | SYSTOLIC BLOOD PRESSURE: 126 MMHG

## 2025-01-31 DIAGNOSIS — E11.69 TYPE 2 DIABETES MELLITUS WITH OTHER SPECIFIED COMPLICATION, UNSPECIFIED WHETHER LONG TERM INSULIN USE (H): ICD-10-CM

## 2025-01-31 DIAGNOSIS — Z82.49 FHX: EARLY CORONARY ARTERY DISEASE: ICD-10-CM

## 2025-01-31 DIAGNOSIS — I10 HYPERTENSION GOAL BP (BLOOD PRESSURE) < 140/90: ICD-10-CM

## 2025-01-31 DIAGNOSIS — E78.2 MIXED HYPERLIPIDEMIA: ICD-10-CM

## 2025-01-31 DIAGNOSIS — I20.89 OTHER FORMS OF ANGINA PECTORIS: ICD-10-CM

## 2025-01-31 LAB
CREAT BLD-MCNC: 0.8 MG/DL (ref 0.7–1.3)
EGFRCR SERPLBLD CKD-EPI 2021: >60 ML/MIN/1.73M2

## 2025-01-31 PROCEDURE — 250N000013 HC RX MED GY IP 250 OP 250 PS 637: Performed by: INTERNAL MEDICINE

## 2025-01-31 PROCEDURE — 75574 CT ANGIO HRT W/3D IMAGE: CPT

## 2025-01-31 PROCEDURE — 250N000011 HC RX IP 250 OP 636: Performed by: INTERNAL MEDICINE

## 2025-01-31 PROCEDURE — 75574 CT ANGIO HRT W/3D IMAGE: CPT | Mod: 26 | Performed by: INTERNAL MEDICINE

## 2025-01-31 PROCEDURE — 82565 ASSAY OF CREATININE: CPT

## 2025-01-31 RX ORDER — METOPROLOL TARTRATE 1 MG/ML
5-20 INJECTION, SOLUTION INTRAVENOUS
Status: DISCONTINUED | OUTPATIENT
Start: 2025-01-31 | End: 2025-02-01 | Stop reason: HOSPADM

## 2025-01-31 RX ORDER — DILTIAZEM HYDROCHLORIDE 120 MG/1
120 TABLET, FILM COATED ORAL
Status: DISCONTINUED | OUTPATIENT
Start: 2025-01-31 | End: 2025-02-01 | Stop reason: HOSPADM

## 2025-01-31 RX ORDER — IVABRADINE 5 MG/1
5-15 TABLET, FILM COATED ORAL
Status: COMPLETED | OUTPATIENT
Start: 2025-01-31 | End: 2025-01-31

## 2025-01-31 RX ORDER — DILTIAZEM HYDROCHLORIDE 5 MG/ML
10-15 INJECTION INTRAVENOUS
Status: DISCONTINUED | OUTPATIENT
Start: 2025-01-31 | End: 2025-02-01 | Stop reason: HOSPADM

## 2025-01-31 RX ORDER — ONDANSETRON 2 MG/ML
4 INJECTION INTRAMUSCULAR; INTRAVENOUS
Status: DISCONTINUED | OUTPATIENT
Start: 2025-01-31 | End: 2025-02-01 | Stop reason: HOSPADM

## 2025-01-31 RX ORDER — LIDOCAINE 40 MG/G
CREAM TOPICAL
Status: DISCONTINUED | OUTPATIENT
Start: 2025-01-31 | End: 2025-02-01 | Stop reason: HOSPADM

## 2025-01-31 RX ORDER — METOPROLOL TARTRATE 25 MG/1
25-100 TABLET, FILM COATED ORAL
Status: COMPLETED | OUTPATIENT
Start: 2025-01-31 | End: 2025-01-31

## 2025-01-31 RX ORDER — IOPAMIDOL 755 MG/ML
120 INJECTION, SOLUTION INTRAVASCULAR ONCE
Status: COMPLETED | OUTPATIENT
Start: 2025-01-31 | End: 2025-01-31

## 2025-01-31 RX ORDER — NITROGLYCERIN 0.4 MG/1
.4-.8 TABLET SUBLINGUAL
Status: DISCONTINUED | OUTPATIENT
Start: 2025-01-31 | End: 2025-02-01 | Stop reason: HOSPADM

## 2025-01-31 RX ADMIN — NITROGLYCERIN 0.8 MG: 0.4 TABLET SUBLINGUAL at 11:43

## 2025-01-31 RX ADMIN — METOPROLOL TARTRATE 50 MG: 50 TABLET, FILM COATED ORAL at 10:13

## 2025-01-31 RX ADMIN — IVABRADINE 10 MG: 5 TABLET, FILM COATED ORAL at 10:12

## 2025-01-31 RX ADMIN — IOPAMIDOL 120 ML: 755 INJECTION, SOLUTION INTRAVENOUS at 11:38

## 2025-01-31 NOTE — PROGRESS NOTES
Pt arrived for Coronary CT angiogram. Test, meds and side effects reviewed with pt. Resting HR 77 bpm; given 50 mg PO Metoprolol + 10 mg PO Ivabradine per verbal order. Administered 0.8 mg SL nitro on CT table per order. CTA completed; tolerated procedure well and denies symptoms of allergic reaction. Post monitoring completed and VSS. D/C instructions reviewed with pt whom verbalized understanding of need to increase PO fluids today. D/C to gold waiting room accompanied by staff.

## 2025-02-03 ENCOUNTER — CARE COORDINATION (OUTPATIENT)
Dept: CARDIOLOGY | Facility: CLINIC | Age: 70
End: 2025-02-03
Payer: COMMERCIAL

## 2025-02-03 ENCOUNTER — MYC MEDICAL ADVICE (OUTPATIENT)
Dept: FAMILY MEDICINE | Facility: CLINIC | Age: 70
End: 2025-02-03
Payer: COMMERCIAL

## 2025-02-03 ENCOUNTER — HOSPITAL ENCOUNTER (OUTPATIENT)
Facility: CLINIC | Age: 70
End: 2025-02-03
Attending: INTERNAL MEDICINE | Admitting: INTERNAL MEDICINE
Payer: COMMERCIAL

## 2025-02-03 DIAGNOSIS — I25.10 CAD, MULTIPLE VESSEL: ICD-10-CM

## 2025-02-03 DIAGNOSIS — I25.10 CAD (CORONARY ARTERY DISEASE): Primary | ICD-10-CM

## 2025-02-03 DIAGNOSIS — I25.10 CAD (CORONARY ARTERY DISEASE): ICD-10-CM

## 2025-02-03 RX ORDER — ASPIRIN 325 MG
325 TABLET ORAL ONCE
Status: CANCELLED | OUTPATIENT
Start: 2025-02-03 | End: 2025-02-03

## 2025-02-03 RX ORDER — ASPIRIN 81 MG/1
243 TABLET, CHEWABLE ORAL ONCE
OUTPATIENT
Start: 2025-02-03

## 2025-02-03 RX ORDER — ASPIRIN 81 MG/1
243 TABLET, CHEWABLE ORAL ONCE
Status: CANCELLED | OUTPATIENT
Start: 2025-02-03

## 2025-02-03 RX ORDER — LIDOCAINE 40 MG/G
CREAM TOPICAL
OUTPATIENT
Start: 2025-02-03

## 2025-02-03 RX ORDER — ASPIRIN 325 MG
325 TABLET ORAL ONCE
OUTPATIENT
Start: 2025-02-03 | End: 2025-02-03

## 2025-02-03 RX ORDER — SODIUM CHLORIDE 9 MG/ML
INJECTION, SOLUTION INTRAVENOUS CONTINUOUS
OUTPATIENT
Start: 2025-02-03

## 2025-02-03 RX ORDER — LIDOCAINE 40 MG/G
CREAM TOPICAL
Status: CANCELLED | OUTPATIENT
Start: 2025-02-03

## 2025-02-03 RX ORDER — SODIUM CHLORIDE 9 MG/ML
INJECTION, SOLUTION INTRAVENOUS CONTINUOUS
Status: CANCELLED | OUTPATIENT
Start: 2025-02-03

## 2025-02-04 NOTE — TELEPHONE ENCOUNTER
Let's do an evisit or telephone visit for me to look into alternaives/etc. Stop jardiance for now and can take over the counter zyrtec for itching. Martin Marsh MD

## 2025-02-04 NOTE — TELEPHONE ENCOUNTER
Sent [employer] message to patient, with Dr Marsh's message.Shawna Cifuentes [facility]

## 2025-02-08 ENCOUNTER — HEALTH MAINTENANCE LETTER (OUTPATIENT)
Age: 70
End: 2025-02-08

## 2025-02-13 ENCOUNTER — APPOINTMENT (OUTPATIENT)
Dept: MEDSURG UNIT | Facility: CLINIC | Age: 70
End: 2025-02-13
Attending: INTERNAL MEDICINE
Payer: COMMERCIAL

## 2025-02-13 ENCOUNTER — APPOINTMENT (OUTPATIENT)
Dept: LAB | Facility: CLINIC | Age: 70
End: 2025-02-13
Attending: INTERNAL MEDICINE
Payer: COMMERCIAL

## 2025-02-13 ENCOUNTER — HOSPITAL ENCOUNTER (OUTPATIENT)
Facility: CLINIC | Age: 70
Discharge: HOME | End: 2025-02-13
Attending: INTERNAL MEDICINE | Admitting: INTERNAL MEDICINE
Payer: COMMERCIAL

## 2025-02-13 VITALS
RESPIRATION RATE: 16 BRPM | SYSTOLIC BLOOD PRESSURE: 145 MMHG | TEMPERATURE: 98.4 F | OXYGEN SATURATION: 98 % | BODY MASS INDEX: 28.91 KG/M2 | HEIGHT: 73 IN | HEART RATE: 74 BPM | DIASTOLIC BLOOD PRESSURE: 82 MMHG | WEIGHT: 218.1 LBS

## 2025-02-13 DIAGNOSIS — I25.10 CAD, MULTIPLE VESSEL: ICD-10-CM

## 2025-02-13 DIAGNOSIS — I25.10 CAD (CORONARY ARTERY DISEASE): ICD-10-CM

## 2025-02-13 PROBLEM — Z98.890 STATUS POST CORONARY ANGIOGRAM: Status: ACTIVE | Noted: 2025-02-13

## 2025-02-13 LAB
ACT BLD: 251 SECONDS (ref 74–150)
ANION GAP SERPL CALCULATED.3IONS-SCNC: 15 MMOL/L (ref 7–15)
APTT PPP: 26 SECONDS (ref 22–38)
BUN SERPL-MCNC: 21.6 MG/DL (ref 8–23)
CALCIUM SERPL-MCNC: 9.5 MG/DL (ref 8.8–10.4)
CHLORIDE SERPL-SCNC: 102 MMOL/L (ref 98–107)
CREAT SERPL-MCNC: 0.85 MG/DL (ref 0.67–1.17)
EGFRCR SERPLBLD CKD-EPI 2021: >90 ML/MIN/1.73M2
ERYTHROCYTE [DISTWIDTH] IN BLOOD BY AUTOMATED COUNT: 13.6 % (ref 10–15)
GLUCOSE SERPL-MCNC: 208 MG/DL (ref 70–99)
HCO3 SERPL-SCNC: 23 MMOL/L (ref 22–29)
HCT VFR BLD AUTO: 46 % (ref 40–53)
HGB BLD-MCNC: 15 G/DL (ref 13.3–17.7)
INR PPP: 1 (ref 0.85–1.15)
MCH RBC QN AUTO: 29.4 PG (ref 26.5–33)
MCHC RBC AUTO-ENTMCNC: 32.6 G/DL (ref 31.5–36.5)
MCV RBC AUTO: 90 FL (ref 78–100)
PLATELET # BLD AUTO: 226 10E3/UL (ref 150–450)
POTASSIUM SERPL-SCNC: 4.4 MMOL/L (ref 3.4–5.3)
RBC # BLD AUTO: 5.11 10E6/UL (ref 4.4–5.9)
SODIUM SERPL-SCNC: 140 MMOL/L (ref 135–145)
WBC # BLD AUTO: 8.7 10E3/UL (ref 4–11)

## 2025-02-13 PROCEDURE — 85730 THROMBOPLASTIN TIME PARTIAL: CPT | Performed by: INTERNAL MEDICINE

## 2025-02-13 PROCEDURE — 85610 PROTHROMBIN TIME: CPT | Performed by: INTERNAL MEDICINE

## 2025-02-13 PROCEDURE — 99207 PR NO CHARGE LOS: CPT

## 2025-02-13 PROCEDURE — 272N000001 HC OR GENERAL SUPPLY STERILE: Performed by: INTERNAL MEDICINE

## 2025-02-13 PROCEDURE — 93571 IV DOP VEL&/PRESS C FLO 1ST: CPT | Mod: 26 | Performed by: INTERNAL MEDICINE

## 2025-02-13 PROCEDURE — 99152 MOD SED SAME PHYS/QHP 5/>YRS: CPT | Mod: GC | Performed by: INTERNAL MEDICINE

## 2025-02-13 PROCEDURE — 250N000011 HC RX IP 250 OP 636: Performed by: INTERNAL MEDICINE

## 2025-02-13 PROCEDURE — 93454 CORONARY ARTERY ANGIO S&I: CPT | Performed by: INTERNAL MEDICINE

## 2025-02-13 PROCEDURE — 85347 COAGULATION TIME ACTIVATED: CPT

## 2025-02-13 PROCEDURE — 999N000134 HC STATISTIC PP CARE STAGE 3

## 2025-02-13 PROCEDURE — 250N000009 HC RX 250: Performed by: INTERNAL MEDICINE

## 2025-02-13 PROCEDURE — 36415 COLL VENOUS BLD VENIPUNCTURE: CPT | Performed by: INTERNAL MEDICINE

## 2025-02-13 PROCEDURE — 258N000003 HC RX IP 258 OP 636: Performed by: INTERNAL MEDICINE

## 2025-02-13 PROCEDURE — C1887 CATHETER, GUIDING: HCPCS | Performed by: INTERNAL MEDICINE

## 2025-02-13 PROCEDURE — 93799 UNLISTED CV SVC/PROCEDURE: CPT | Performed by: INTERNAL MEDICINE

## 2025-02-13 PROCEDURE — C1894 INTRO/SHEATH, NON-LASER: HCPCS | Performed by: INTERNAL MEDICINE

## 2025-02-13 PROCEDURE — 85018 HEMOGLOBIN: CPT | Performed by: INTERNAL MEDICINE

## 2025-02-13 PROCEDURE — 93454 CORONARY ARTERY ANGIO S&I: CPT | Mod: 26 | Performed by: INTERNAL MEDICINE

## 2025-02-13 PROCEDURE — 99153 MOD SED SAME PHYS/QHP EA: CPT | Performed by: INTERNAL MEDICINE

## 2025-02-13 PROCEDURE — 93010 ELECTROCARDIOGRAM REPORT: CPT | Performed by: INTERNAL MEDICINE

## 2025-02-13 PROCEDURE — C1769 GUIDE WIRE: HCPCS | Performed by: INTERNAL MEDICINE

## 2025-02-13 PROCEDURE — 999N000142 HC STATISTIC PROCEDURE PREP ONLY

## 2025-02-13 PROCEDURE — 99152 MOD SED SAME PHYS/QHP 5/>YRS: CPT | Performed by: INTERNAL MEDICINE

## 2025-02-13 PROCEDURE — C1726 CATH, BAL DIL, NON-VASCULAR: HCPCS | Performed by: INTERNAL MEDICINE

## 2025-02-13 PROCEDURE — 999N000054 HC STATISTIC EKG NON-CHARGEABLE

## 2025-02-13 PROCEDURE — 80048 BASIC METABOLIC PNL TOTAL CA: CPT | Performed by: INTERNAL MEDICINE

## 2025-02-13 RX ORDER — SODIUM CHLORIDE 9 MG/ML
INJECTION, SOLUTION INTRAVENOUS CONTINUOUS
Status: DISCONTINUED | OUTPATIENT
Start: 2025-02-13 | End: 2025-02-13 | Stop reason: HOSPADM

## 2025-02-13 RX ORDER — FENTANYL CITRATE 50 UG/ML
25 INJECTION, SOLUTION INTRAMUSCULAR; INTRAVENOUS
Status: DISCONTINUED | OUTPATIENT
Start: 2025-02-13 | End: 2025-02-13 | Stop reason: HOSPADM

## 2025-02-13 RX ORDER — FENTANYL CITRATE 50 UG/ML
INJECTION, SOLUTION INTRAMUSCULAR; INTRAVENOUS
Status: DISCONTINUED | OUTPATIENT
Start: 2025-02-13 | End: 2025-02-13 | Stop reason: HOSPADM

## 2025-02-13 RX ORDER — ASPIRIN 81 MG/1
243 TABLET, CHEWABLE ORAL ONCE
Status: COMPLETED | OUTPATIENT
Start: 2025-02-13 | End: 2025-02-13

## 2025-02-13 RX ORDER — NICARDIPINE HYDROCHLORIDE 2.5 MG/ML
INJECTION INTRAVENOUS
Status: DISCONTINUED | OUTPATIENT
Start: 2025-02-13 | End: 2025-02-13 | Stop reason: HOSPADM

## 2025-02-13 RX ORDER — ATORVASTATIN CALCIUM 80 MG/1
80 TABLET, FILM COATED ORAL DAILY
Qty: 90 TABLET | Refills: 3 | Status: SHIPPED | OUTPATIENT
Start: 2025-02-13

## 2025-02-13 RX ORDER — NALOXONE HYDROCHLORIDE 0.4 MG/ML
0.2 INJECTION, SOLUTION INTRAMUSCULAR; INTRAVENOUS; SUBCUTANEOUS
Status: DISCONTINUED | OUTPATIENT
Start: 2025-02-13 | End: 2025-02-13 | Stop reason: HOSPADM

## 2025-02-13 RX ORDER — NALOXONE HYDROCHLORIDE 0.4 MG/ML
0.4 INJECTION, SOLUTION INTRAMUSCULAR; INTRAVENOUS; SUBCUTANEOUS
Status: DISCONTINUED | OUTPATIENT
Start: 2025-02-13 | End: 2025-02-13 | Stop reason: HOSPADM

## 2025-02-13 RX ORDER — OXYCODONE HYDROCHLORIDE 5 MG/1
5 TABLET ORAL EVERY 4 HOURS PRN
Status: DISCONTINUED | OUTPATIENT
Start: 2025-02-13 | End: 2025-02-13 | Stop reason: HOSPADM

## 2025-02-13 RX ORDER — LIDOCAINE 40 MG/G
CREAM TOPICAL
Status: DISCONTINUED | OUTPATIENT
Start: 2025-02-13 | End: 2025-02-13 | Stop reason: HOSPADM

## 2025-02-13 RX ORDER — ASPIRIN 325 MG
325 TABLET ORAL ONCE
Status: COMPLETED | OUTPATIENT
Start: 2025-02-13 | End: 2025-02-13

## 2025-02-13 RX ORDER — HEPARIN SODIUM 200 [USP'U]/100ML
INJECTION, SOLUTION INTRAVENOUS
Status: DISCONTINUED | OUTPATIENT
Start: 2025-02-13 | End: 2025-02-13 | Stop reason: HOSPADM

## 2025-02-13 RX ORDER — ATROPINE SULFATE 0.1 MG/ML
0.5 INJECTION INTRAVENOUS
Status: DISCONTINUED | OUTPATIENT
Start: 2025-02-13 | End: 2025-02-13 | Stop reason: HOSPADM

## 2025-02-13 RX ORDER — FLUMAZENIL 0.1 MG/ML
0.2 INJECTION, SOLUTION INTRAVENOUS
Status: DISCONTINUED | OUTPATIENT
Start: 2025-02-13 | End: 2025-02-13 | Stop reason: HOSPADM

## 2025-02-13 RX ORDER — ACETAMINOPHEN 325 MG/1
650 TABLET ORAL EVERY 4 HOURS PRN
Status: DISCONTINUED | OUTPATIENT
Start: 2025-02-13 | End: 2025-02-13 | Stop reason: HOSPADM

## 2025-02-13 RX ORDER — IOPAMIDOL 755 MG/ML
INJECTION, SOLUTION INTRAVASCULAR
Status: DISCONTINUED | OUTPATIENT
Start: 2025-02-13 | End: 2025-02-13 | Stop reason: HOSPADM

## 2025-02-13 RX ORDER — NITROGLYCERIN 5 MG/ML
VIAL (ML) INTRAVENOUS
Status: DISCONTINUED | OUTPATIENT
Start: 2025-02-13 | End: 2025-02-13 | Stop reason: HOSPADM

## 2025-02-13 RX ORDER — OXYCODONE HYDROCHLORIDE 10 MG/1
10 TABLET ORAL EVERY 4 HOURS PRN
Status: DISCONTINUED | OUTPATIENT
Start: 2025-02-13 | End: 2025-02-13 | Stop reason: HOSPADM

## 2025-02-13 RX ORDER — HEPARIN SODIUM 1000 [USP'U]/ML
INJECTION, SOLUTION INTRAVENOUS; SUBCUTANEOUS
Status: DISCONTINUED | OUTPATIENT
Start: 2025-02-13 | End: 2025-02-13 | Stop reason: HOSPADM

## 2025-02-13 RX ADMIN — SODIUM CHLORIDE: 9 INJECTION, SOLUTION INTRAVENOUS at 12:03

## 2025-02-13 ASSESSMENT — ACTIVITIES OF DAILY LIVING (ADL)
ADLS_ACUITY_SCORE: 41

## 2025-02-13 NOTE — PROGRESS NOTES
Pt on 2A prep for CORS. Vitally stable, denies pain. 20 gauge IV placed left forearm. Groins prepped, foot pulses marked. Labs resulted. PT took 325 aspirin last night and this morning. Appropriately NPO. Awaiting consent. Wife Corinne bedside.

## 2025-02-13 NOTE — DISCHARGE INSTRUCTIONS
Going Home after an Angiogram (Cardiac)  ______________________________________________      After you go home:  Have an adult stay with you for 24 hours.  Drink plenty of fluids.  You may eat your normal diet, unless your doctor tells you otherwise.  For 24 hours:  Relax and take it easy.  Do NOT smoke.  Do NOT make any important or legal decisions.  Do NOT drive or operate machines at home or at work.  Do NOT drink alcohol.  Remove the Band-Aid after 24 hours. If there is minor oozing, apply another Band-aid and remove it after 12 hours.  For 2 days, do NOT have sex or do any heavy exercise.  Do NOT take a bath, or use a hot tub or pool for at least 3 days. You may shower.    Care of wrist or arm site  It is normal to have soreness at the puncture site and mild tingling in your hand for up to 3 days.  For 2 days, do not use your hand or arm to support your weight (such as rising from a chair) or bend your wrist.  For 2 days, do not lift more than 5 pounds or exercise your arm (tennis, golf or bowling).    If you start bleeding from the site in your arm:  Sit down and press firmly on the site with your fingers for 10 minutes. Call your doctor as soon as you can.  If the bleeding stops, sit still and keep your wrist straight for 2 hours.    Medicines  If you are on metformin (Glucophage), do not restart it for 48 hours due to IV contrast given.     Call 911 right away if you have bleeding that is heavy or does not stop.    Call your doctor if:  You have a large or growing hard lump around the site.  The site is red, swollen, hot or tender.  Blood or fluid is draining from the site.  You have chills or a fever greater than 101 F (38 C).  Your arm feels numb or cool.  You have hives, a rash or unusual itching.      [facility] [place]:  [phone] (7 days a week)

## 2025-02-13 NOTE — PROGRESS NOTES
Pt s/p CORS procedure. Vitally stable. Denies pain. Right radial with TR band (12cc of air), no bleeding or hematoma, CMS intact. PO food/drink provided. Post procedure orders released. Will start removing air from band at 1630. Wife bedside.

## 2025-02-13 NOTE — H&P
"History and Physical: Cardiology Cath Lab Service    Rohit William MRN# [id number]   YOB: 1955 Age: 69 year old         Assessment and plan:   Rohit William is a 69 year old male with a history of 3v CAD on CT, hypertension, dyslipoproteinemia, diabetes mellitus, and lung nodule       # Multivessel CAD on CT  # Elevated Calcium score  He recently underwent elective coronary CT calcium scoring study on 12/9/2024 for \"reassurance\" per patient report. This revealed severe coronary calcification with a total calcium score of 3869 and also dilated ascending aorta measuring 4.5 x 4.4 cm. He was therefore referred to the cardiology clinic for evaluation and management recommendations. CT Coronary angiogram showed multivessel CAD.  - Procedure reviewed with patient and wife; all questions answered to patient's satisfaction  - No contraindications noted, will move forward with the procedure(s).  - Patient will be admitted as OP to Obs unit post procedure, with plans to discharge home after post procedure orders have been met      Sydney Lovett PA-C  [facility] Cardiology Cath Lab Services  [phone]    HPI:   Rohit William is a 69 year old male with a history of 3v CAD on CT, hypertension, dyslipoproteinemia, diabetes mellitus, and lung nodule  He recently underwent elective coronary CT calcium scoring study on 12/9/2024 for \"reassurance\" per patient report. This revealed severe coronary calcification with a total calcium score of 3869 and also dilated ascending aorta measuring 4.5 x 4.4 cm. He was therefore referred to the cardiology clinic for evaluation and management recommendations. A CT Coronary Angiogram was subsequently ordered which showed multivessel CAD.   Patient reports feeling well today. No chest pain, dyspnea, lower extremity edema, dizziness, lightheadedness, palpitations, presyncope, or syncope. No recent illness, fevers, chills, nausea, vomiting, diarrhea, dysuria, or abdominal pain. No headache, " visual changes, numbness, tingling, or weakness. No bleeding or clotting problems.     Past Medical History:   Diagnosis Date    Diabetes (H)     Hypertension        Past Surgical History:   Procedure Laterality Date    COLONOSCOPY N/A 12/07/2022    Procedure: COLONOSCOPY, FLEXIBLE, WITH LESION REMOVAL USING SNARE;  Surgeon: Gomez Zurita MD;  Location: [facility] OR    COLONOSCOPY WITH CO2 INSUFFLATION N/A 12/07/2022    Procedure: COLONOSCOPY, WITH CO2 INSUFFLATION;  Surgeon: Gomez Zurita MD;  Location: [facility] OR    ORTHOPEDIC SURGERY  2003       Current Facility-Administered Medications   Medication Dose Route Frequency Provider Last Rate Last Admin    aspirin (ASA) tablet 325 mg  325 mg Oral Once Delon Payne MD        Or    aspirin (ASA) chewable tablet 243 mg  243 mg Oral Once Delon Payne MD        lidocaine (LMX4) cream   Topical Q1H PRN Delon Payne MD        lidocaine 1 % 0.1-1 mL  0.1-1 mL Other Q1H PRN Delon Payne MD        sodium chloride (PF) 0.9% PF flush 3 mL  3 mL Intracatheter Q8H Delon Payne MD        sodium chloride (PF) 0.9% PF flush 3 mL  3 mL Intracatheter Q1H PRN Delon Payne MD        sodium chloride (PF) 0.9% PF flush 3 mL  3 mL Intracatheter q1 min prn Delon Payne MD        sodium chloride 0.9 % infusion   Intravenous Continuous Delon Payne MD           Family History   Problem Relation Age of Onset    Alzheimer Disease Mother     Heart Disease Father     Coronary Artery Disease Father     Diabetes Sister     Coronary Artery Disease Brother     Diabetes Brother        Social History     Tobacco Use    Smoking status: Never    Smokeless tobacco: Never    Tobacco comments:     Occ. cigar    Substance Use Topics    Alcohol use: Yes       Allergies   Allergen Reactions    Jardiance [Empagliflozin]      rash         ROS:   All systems reviewed and negative unless documented in HPI.     Physical Examination:  Vitals: BP (!) 135/97   Temp 98.4  F (36.9  C) (Oral)   Ht 1.854  "m (6' 1\")   Wt 98.9 kg (218 lb 1.6 oz)   SpO2 97%   BMI 28.77 kg/m    BMI= Body mass index is 28.77 kg/m .    GENERAL APPEARANCE: Pleasant and well appearing elderly male. Appears comfortable and in no acute distress. Alert and interactive.   HEENT: NCAT. Sclera clear.   CHEST: Normal work of breathing on room air without use of accessory muscles, no retractions. Lungs clear to auscultation without rales, rhonchi or wheezes,  CARDIOVASCULAR: regular rate and rhythm, normal S1 and S2, no S3 or S4 and no murmur, click or rub.   EXTREMITIES: warm, no clubbing or cyanosis, warm and well perfused  NEURO: alert and oriented to person/place/time, normal speech, moves all extremities  PSYCH: mood and affect appropriate  SKIN: no ecchymoses, no rashes, warm and dry to touch      Laboratory:  CMP  Recent Labs   Lab 02/13/25  1104      POTASSIUM 4.4   CHLORIDE 102   CO2 23   ANIONGAP 15   *   BUN 21.6   CR 0.85   GFRESTIMATED >90   MAK 9.5     CBC  Recent Labs   Lab 02/13/25  1104   WBC 8.7   RBC 5.11   HGB 15.0   HCT 46.0   MCV 90   MCH 29.4   MCHC 32.6   RDW 13.6            EKG 2/13/25:       TTE 1/2/25:  Global and regional left ventricular function is normal with an EF of 55-60%.  Mild concentric wall thickening consistent with left ventricular hypertrophy  is present.  Global right ventricular function is normal.  No significant valvular abnormalities were noted.  Dilated aortic root and ascending aorta measuring 4.4 cm (2.0 cm/m2) and 4.4  cm (2.0 cm/m2 respectively.) Recommend CTA or MRA chest to better assess  aortic dimensions.  Previous study not available for comparison.    CT Coronary angiogram 1/31/25:  1.  Severe three-vessel coronary artery disease.   2.  Total Agatston score 3671 placing the patient in the 97th  percentile when compared to an age- and gender-matched control group.  3.  Please review the separate Radiology report for incidental  noncardiac findings.     FINDINGS:   "   CORONARY CALCIUM SCORE     Total Agatston calcium score: 3671   Left main: 520  Left anterior descendin  Left circumflex: 335  Right coronary artery: 1590   This places the patient in the 97th percentile when compared to an  age- and gender-matched control group.     CORONARY ANGIOGRAPHY     DOMINANCE: Right dominant system.   Normal coronary origins and course.     LEFT MAIN:   The LM is a medium caliber vessel.   The left main arises normally from the left coronary cusp and has a  mild (25-49%) stenosis composed of mixed plaque.     LEFT ANTERIOR DESCENDING:   The LAD is a medium caliber vessel. It has a type III morphology. It  gives rise to proximal medium caliber first diagonal branch and a  distal medium caliber second diagonal branch.   Proximal LAD: Mild (25-49%) stenosis composed of mixed plaque.  Mid LAD: 100% occluded proximal to the origin of the second diagonal  branch.  Distal LAD: Non-diagnostic due to insufficient opacification.  First diagonal: Severe (>70%) ostial stenosis composed of mixed  plaque.  Second diagonal: 100% occluded.  The remainder of the left anterior descending and its major diagonal  branches are patent with minimal luminal irregularities.     LEFT CIRCUMFLEX:   The LCx is a medium caliber vessel. It gives rise to a proximal medium  caliber first obtuse marginal branch and a distal small caliber second  obtuse marginal branch.   Proximal LCX: Moderate (50-69%) stenosis composed of mixed plaque.  Mid LCX: Moderate (50-69%) stenosis composed of mixed plaque.  Distal LCX: Severe (>70%) stenosis composed of calcified plaque.  First marginal: Severe (>70%) stenosis composed of mixed plaque.  Second marginal: Severe (>70%) stenosis composed of mixed plaque.  The remainder of the left circumflex and its major marginal branches  are patent with minimal luminal irregularities.     RIGHT CORONARY ARTERY:   The RCA is a large caliber vessel.    Proximal RCA: Minimal (<25%) stenosis  composed of mixed plaque.  Mid RCA: Mild (25-49%) stenosis composed of mixed plaque.  Distal RCA: Severe (>70%) stenosis composed of mixed plaque.  The remainder of the right coronary and the posterior descending  artery are non-diagnostic due to insufficient opacification.

## 2025-02-13 NOTE — PRE-PROCEDURE
GENERAL PRE-PROCEDURE:   Procedure:  Coronary angiogram with possible percutaneous coronary intervention  Date/Time:  2/13/2025 12:22 PM    Verbal consent obtained?: Yes    Written consent obtained?: Yes    Risks and benefits: Risks, benefits and alternatives were discussed    DC Plan: Appropriate discharge home plan in place for patients who are going home after procedure   Consent given by:  Patient  Patient states understanding of procedure being performed: Yes    Patient's understanding of procedure matches consent: Yes    Procedure consent matches procedure scheduled: Yes    Expected level of sedation:  Moderate  Appropriately NPO:  Yes  ASA Class:  2  Mallampati  :  Grade 2- soft palate, base of uvula, tonsillar pillars, and portion of posterior pharyngeal wall visible  Lungs:  Lungs clear with good breath sounds bilaterally  Heart:  Normal heart sounds and rate  History & Physical reviewed:  Abbreviated history and physical done prior to moderate sedation  Statement of review:  I have reviewed the lab findings, diagnostic data, medications, and the plan for sedation

## 2025-02-13 NOTE — PRE-PROCEDURE
"Consenting/Education for Cardiology Procedure: Possible percutaneous intervention and Coronary angiogram    Patient understands we would like to perform the listed procedure(s) due to multivessel CAD on CT.    The patient understands the following:     The procedure was described to the patient in detail.    Moderate sedation is required for this procedure and the risks, benefits and alternatives to moderate sedation were discussed. Patient also understands risks and complications of the procedure which include but are not limited to bruising/swelling around the incision site, infection, bleeding, allergic reaction to local anesthetic, air embolism, arterial puncture, stroke, heart attack, need for emergency surgery, death.    Patient verbalized understanding of risks and benefits and has elected to proceed with the procedure or procedures listed above.    Clinically Significant Risk Factors Present on Admission                # Drug Induced Platelet Defect: home medication list includes an antiplatelet medication   # Hypertension: Home medication list includes antihypertensive(s)          # DMII: A1C = N/A within past 6 months    # Overweight: Estimated body mass index is 28.77 kg/m  as calculated from the following:    Height as of this encounter: 1.854 m (6' 1\").    Weight as of this encounter: 98.9 kg (218 lb 1.6 oz).         Cardiovascular : Native vessel CAD     Sydney Lovett PA-C  Cardiology    "

## 2025-02-14 LAB
ATRIAL RATE - MUSE: 86 BPM
DIASTOLIC BLOOD PRESSURE - MUSE: NORMAL MMHG
INTERPRETATION ECG - MUSE: NORMAL
P AXIS - MUSE: 22 DEGREES
PR INTERVAL - MUSE: 154 MS
QRS DURATION - MUSE: 78 MS
QT - MUSE: 360 MS
QTC - MUSE: 430 MS
R AXIS - MUSE: -10 DEGREES
SYSTOLIC BLOOD PRESSURE - MUSE: NORMAL MMHG
T AXIS - MUSE: -10 DEGREES
VENTRICULAR RATE- MUSE: 86 BPM

## 2025-02-14 NOTE — PROGRESS NOTES
Patient tolerated recovery stage well. VSS, right wrist site clean/dry/intact, no hematoma, and denies pain. Patient tolerated PO food and fluids. Teaching was done and discharge instructions were given. Patient ambulated, voided, and PIV was removed. Patient discharged from the hospital ambulatory to home with family.

## 2025-02-20 ENCOUNTER — OFFICE VISIT (OUTPATIENT)
Dept: CARDIOLOGY | Facility: CLINIC | Age: 70
End: 2025-02-20
Attending: THORACIC SURGERY (CARDIOTHORACIC VASCULAR SURGERY)
Payer: COMMERCIAL

## 2025-02-20 VITALS
HEART RATE: 80 BPM | DIASTOLIC BLOOD PRESSURE: 85 MMHG | WEIGHT: 228.9 LBS | BODY MASS INDEX: 30.2 KG/M2 | SYSTOLIC BLOOD PRESSURE: 122 MMHG | OXYGEN SATURATION: 97 %

## 2025-02-20 DIAGNOSIS — I25.118 CORONARY ARTERY DISEASE OF NATIVE ARTERY OF NATIVE HEART WITH STABLE ANGINA PECTORIS: Primary | ICD-10-CM

## 2025-02-20 PROCEDURE — G0463 HOSPITAL OUTPT CLINIC VISIT: HCPCS | Performed by: THORACIC SURGERY (CARDIOTHORACIC VASCULAR SURGERY)

## 2025-02-20 RX ORDER — LANCETS 33 GAUGE
EACH MISCELLANEOUS
COMMUNITY
Start: 2024-06-18

## 2025-02-20 ASSESSMENT — PAIN SCALES - GENERAL: PAINLEVEL_OUTOF10: NO PAIN (0)

## 2025-02-20 NOTE — NURSING NOTE
Chief Complaint   Patient presents with    New Patient     NEW CV SURGERY - CABG       Vitals were taken and medications reconciled.    Gomez Cristobal, EMT  4:15 PM

## 2025-02-20 NOTE — LETTER
2/20/2025      RE: Rohit William  [address]  [facility] [address]       Dear Colleague,    Thank you for the opportunity to participate in the care of your patient, Rohit William, at the [facility]. Please see a copy of my visit note below.    [facility] CARDIOTHORACIC SURGERY CONSULT  Patient Name: Rohit William  Medical Record Number: [id number]  YOB: 1955  Room Number: Room/bed info not found  Referring Physician: Dr. Payne    CC: Coronary artery disease    History of Present Illness: Rohit William is a 69 year old male with 3V CAD discovered after elective workup.  He has not noticed symptoms.  Coronary angiogram showed 3V CAD not amenable to stenting.    Assessment and Plan:  Rohit William is a 69 year old male with 3V CAD  1) Plan CABG - LIMA to LAD, SVG to D1, Ramus, OM, PDA, possible THOMAS  2) Check carotid, left radial and venous US   3) Please call with questions or concerns.     Thank you for the opportunity to participate in the care of this patient.    Miguel Barnes MD  Cardiothoracic Surgery  [phone]    Past Medical History:  Past Medical History:   Diagnosis Date     Diabetes (H)      Hypertension        Past Surgical History:  Past Surgical History:   Procedure Laterality Date     COLONOSCOPY N/A 12/07/2022    Procedure: COLONOSCOPY, FLEXIBLE, WITH LESION REMOVAL USING SNARE;  Surgeon: Gomez Zurita MD;  Location: [facility] OR     COLONOSCOPY WITH CO2 INSUFFLATION N/A 12/07/2022    Procedure: COLONOSCOPY, WITH CO2 INSUFFLATION;  Surgeon: Gomez Zurita MD;  Location: [facility] OR     CV CORONARY ANGIOGRAM N/A 2/13/2025    Procedure: Coronary Angiogram;  Surgeon: Carlos Lepe MD;  Location:  HEART CARDIAC CATH LAB     CV INSTANTANEOUS WAVE-FREE RATIO N/A 2/13/2025    Procedure: Instantaneous Wave-Free Ratio;  Surgeon: Carlos Lepe MD;  Location:   HEART CARDIAC CATH LAB     ORTHOPEDIC SURGERY  2003        Family History:   Family History   Problem Relation Age of Onset     Alzheimer Disease Mother      Heart Disease Father      Coronary Artery Disease Father      Diabetes Sister      Coronary Artery Disease Brother      Diabetes Brother        Social History:  Social History     Socioeconomic History     Marital status:      Spouse name: Not on file     Number of children: Not on file     Years of education: Not on file     Highest education level: Not on file   Occupational History     Not on file   Tobacco Use     Smoking status: Never     Smokeless tobacco: Never     Tobacco comments:     Occ. cigar    Vaping Use     Vaping status: Never Used   Substance and Sexual Activity     Alcohol use: Yes     Drug use: Not Currently     Types: Marijuana     Sexual activity: Not Currently     Partners: Female     Birth control/protection: Post-menopausal, Male Surgical   Other Topics Concern     Parent/sibling w/ CABG, MI or angioplasty before 65F 55M? Yes   Social History Narrative     Not on file     Social Drivers of Health     Financial Resource Strain: Low Risk  (10/30/2024)    Financial Resource Strain      Within the past 12 months, have you or your family members you live with been unable to get utilities (heat, electricity) when it was really needed?: No   Food Insecurity: Low Risk  (10/30/2024)    Food Insecurity      Within the past 12 months, did you worry that your food would run out before you got money to buy more?: No      Within the past 12 months, did the food you bought just not last and you didn t have money to get more?: No   Transportation Needs: Low Risk  (10/30/2024)    Transportation Needs      Within the past 12 months, has lack of transportation kept you from medical appointments, getting your medicines, non-medical meetings or appointments, work, or from getting things that you need?: No   Physical Activity: Insufficiently Active  (10/30/2024)    Exercise Vital Sign      Days of Exercise per Week: 1 day      Minutes of Exercise per Session: 20 min   Stress: No Stress Concern Present (10/30/2024)    [employer] - Occupational Stress Questionnaire      Feeling of Stress : Not at all   Social Connections: Unknown (10/30/2024)    Social Connection and Isolation Panel [NHANES]      Frequency of Communication with Friends and Family: Not on file      Frequency of Social Gatherings with Friends and Family: More than three times a week      Attends Nondenominational Services: Not on file      Active Member of Clubs or Organizations: Not on file      Attends Club or Organization Meetings: Not on file      Marital Status: Not on file   Interpersonal Safety: Unknown (2/13/2025)    Interpersonal Safety      Do you feel physically and emotionally safe where you currently live?: Patient unable to answer      Within the past 12 months, have you been hit, slapped, kicked or otherwise physically hurt by someone?: Patient unable to answer      Within the past 12 months, have you been humiliated or emotionally abused in other ways by your partner or ex-partner?: Patient unable to answer   Housing Stability: Low Risk  (10/30/2024)    Housing Stability      Do you have housing? : Yes      Are you worried about losing your housing?: No       Allergies:     Allergies   Allergen Reactions     Jardiance [Empagliflozin]      rash       Medications:  Current Outpatient Medications   Medication Sig Dispense Refill     aspirin (ASA) 81 MG tablet Take 1 tablet (81 mg) by mouth daily 1 tablet 3     atorvastatin (LIPITOR) 10 MG tablet Take 1 tablet (10 mg) by mouth daily or cholesterol/prevents hearts attacks/strokes 90 tablet 3     atorvastatin (LIPITOR) 80 MG tablet Take 1 tablet (80 mg) by mouth daily. 90 tablet 3     blood glucose (NO BRAND SPECIFIED) lancets standard Use to test blood sugar one time daily or as directed. 100 each 3     blood  glucose (ONETOUCH ULTRA) test strip USE TO TEST BLOOD SUGAR ONE TIME DAILY OR AS DIRECTED. 100 strip 1     blood glucose monitoring (ONE TOUCH ULTRA 2) meter device kit Use to test blood sugar one time daily or as directed. 1 kit 0     Lancets (ONETOUCH DELICA PLUS [id number]) MISC USE TO TEST BLOOD SUGAR ONE TIME DAILY OR AS DIRECTED.       losartan-hydrochlorothiazide (HYZAAR) 100-25 MG tablet Take 1 tablet by mouth daily. For blood pressure in AM. This is a combination pill of cozaar and hydrochlorothiazide. Finish individual pills first 90 tablet 1     metFORMIN (GLUCOPHAGE XR) 500 MG 24 hr tablet TAKE 1 TAB  BY MOUTH DAILY (WITH BREAKFAST) AND 1 TAB DAILY (WITH DINNER). This is half dosage 180 tablet 1     sitagliptin (JANUVIA) 25 MG tablet Take 1 tablet (25 mg) by mouth daily. 30 tablet 3     bisacodyl (DULCOLAX) 5 MG EC tablet Take 2 tablets at 3 pm the day before your procedure. If your procedure is before 11 am, take 2 additional tablets at 11 pm. If your procedure is after 11 am, take 2 additional tablets at 6 am. For additional instructions refer to your colonoscopy prep instructions. (Patient not taking: Reported on 12/1/2022) 4 tablet 0     polyethylene glycol (GOLYTELY) 236 g suspension The night before the exam at 6 pm drink an 8-ounce glass every 15 minutes until the jug is half empty. If you arrive before 11 AM: Drink the other half of the Golytely jug at 11 PM night before procedure. If you arrive after 11 AM: Drink the other half of the Golytely jug at 6 AM day of procedure. For additional instructions refer to your colonoscopy prep instructions. (Patient not taking: Reported on 12/1/2022) 4000 mL 0     No current facility-administered medications for this visit.       Review of Systems: A 10 point ROS was performed and is negative other than HPI.    Physical Exam:  Pulse:  [80] 80  BP: (122)/(85) 122/85  SpO2:  [97 %] 97 %    Gen: NAD, resting comfortably in chair   Lungs: CTAB, non-labored  "breathing   CV: regular rhythm, normal rate   Abd: Soft, not tender, not distended   Ext: Motor, sensation, pulses intact   Neuro: AOx3    Labs:  Lab Results   Component Value Date    WBC 8.7 02/13/2025     Lab Results   Component Value Date    RBC 5.11 02/13/2025     Lab Results   Component Value Date    HGB 15.0 02/13/2025     Lab Results   Component Value Date    HCT 46.0 02/13/2025     No components found for: \"MCT\"  Lab Results   Component Value Date    MCV 90 02/13/2025     Lab Results   Component Value Date    MCH 29.4 02/13/2025     Lab Results   Component Value Date    MCHC 32.6 02/13/2025     Lab Results   Component Value Date    RDW 13.6 02/13/2025     Lab Results   Component Value Date     02/13/2025     Last Comprehensive Metabolic Panel:  Lab Results   Component Value Date     02/13/2025    POTASSIUM 4.4 02/13/2025    CHLORIDE 102 02/13/2025    CO2 23 02/13/2025    ANIONGAP 15 02/13/2025     (H) 02/13/2025    BUN 21.6 02/13/2025    CR 0.85 02/13/2025    GFRESTIMATED >90 02/13/2025    MAK 9.5 02/13/2025         Imaging:  Transthoracic echocardiogram (1/2/25):  Interpretation Summary     Global and regional left ventricular function is normal with an EF of 55-60%.  Mild concentric wall thickening consistent with left ventricular hypertrophy  is present.  Global right ventricular function is normal.  No significant valvular abnormalities were noted.  Dilated aortic root and ascending aorta measuring 4.4 cm (2.0 cm/m2) and 4.4  cm (2.0 cm/m2 respectively.) Recommend CTA or MRA chest to better assess  aortic dimensions.  Previous study not available for comparison.  _________________________________________    Coronary angiogram (2/13/25):  Coronary Findings    Diagnostic  Dominance: Right  Left Main   The vessel is large.   Mid LM to Dist LM lesion is 20% stenosed.      Left Anterior Descending   The vessel is moderate in size.   Prox LAD to Mid LAD lesion is 40% stenosed. The lesion is " irregular. The lesion is calcified. Pressure wire/iFR used. dPR was measured as 0.89   Mid LAD lesion is 70% stenosed. Measured dPr. Pre adenosine administration dPr: 0.89.      First Diagonal Branch   The vessel is large.   1st Diag lesion is 70% stenosed.      Second Diagonal Branch   The vessel is moderate in size.   2nd Diag-1 lesion is 70% stenosed.   2nd Diag-2 lesion is 70% stenosed.      Ramus Intermedius   The vessel is large.   Ramus lesion is 60% stenosed.      Left Circumflex   Prox Cx lesion is 75% stenosed.      First Obtuse Marginal Branch   The vessel is moderate in size and is angiographically normal.      Second Obtuse Marginal Branch   The vessel is small.   2nd Mrg lesion is 60% stenosed.      Right Coronary Artery   The vessel was visualized by selective angiography and is large. There was 0% mildly ectatic vessel disease.   Prox RCA to Mid RCA lesion is 30% stenosed. The lesion is irregular.      Right Ventricular Branch   The vessel is small.      Right Posterior Descending Artery   The vessel is moderate in size.   RPDA-1 lesion is 60% stenosed.   RPDA-2 lesion is 75% stenosed.      Right Posterior Atrioventricular Artery   The vessel is moderate in size.   RPAV lesion is 90% stenosed.      First Right Posterolateral Branch   The vessel is moderate in size and is angiographically normal.      Second Right Posterolateral Branch   The vessel is small and is angiographically normal.      Third Right Posterolateral Branch   The vessel is small and is angiographically normal.               Please do not hesitate to contact me if you have any questions/concerns.     Sincerely,     Miguel Barnes MD

## 2025-02-20 NOTE — PATIENT INSTRUCTIONS
You were seen today in the [facility]                     Cardiothoracic Surgery Clinic    Your surgeon was Dr Miguel Barnes recommends:    coronary artery bypass graft  Pre operative testing including; history and physical, labs, EKG, chest xray, arm ultrasound, carotid ultrasound, vein mapping, - we will get these scheduled for you!  Coronary angiogram - you will need a  for this test      *Pre operative testing needed within 30 days of surgery. History and physical with the anesthesia team (PAC), labs, EKG, and chest xray.     *You will be contacted by our surgery scheduler Zamzam to set up your surgery date. Once we have your surgery date you will hear from Dannielle, our clinic scheduler, to set up your pre op appointments. Once these are both scheduled you will get a letter with pre operative instructions including possible medication holds, where and when to be at the hospital, and what to bring with you.    Please feel free to call me with any questions or concerns.    Jamaica Aguilera RN   Cardiothoracic Surgery  [phone]

## 2025-02-20 NOTE — PROGRESS NOTES
[facility] CARDIOTHORACIC SURGERY CONSULT  Patient Name: Rohit William  Medical Record Number: [id number]  YOB: 1955  Room Number: Room/bed info not found  Referring Physician: Dr. Payne    CC: Coronary artery disease    History of Present Illness: Rohit William is a 69 year old male with 3V CAD discovered after elective workup.  He has not noticed symptoms.  Coronary angiogram showed 3V CAD not amenable to stenting.    Assessment and Plan:  Rohit William is a 69 year old male with 3V CAD  1) Plan CABG - LIMA to LAD, SVG to D1, Ramus, OM, PDA, possible THOMAS  2) Check carotid, left radial and venous US   3) Please call with questions or concerns.     Thank you for the opportunity to participate in the care of this patient.    Miguel Barnes MD  Cardiothoracic Surgery  [phone]    Past Medical History:  Past Medical History:   Diagnosis Date    Diabetes (H)     Hypertension        Past Surgical History:  Past Surgical History:   Procedure Laterality Date    COLONOSCOPY N/A 12/07/2022    Procedure: COLONOSCOPY, FLEXIBLE, WITH LESION REMOVAL USING SNARE;  Surgeon: Gomez Zurita MD;  Location: [facility] OR    COLONOSCOPY WITH CO2 INSUFFLATION N/A 12/07/2022    Procedure: COLONOSCOPY, WITH CO2 INSUFFLATION;  Surgeon: Gomez Zurita MD;  Location: [facility] OR    CV CORONARY ANGIOGRAM N/A 2/13/2025    Procedure: Coronary Angiogram;  Surgeon: Carlos Lepe MD;  Location:  [facility]    CV INSTANTANEOUS WAVE-FREE RATIO N/A 2/13/2025    Procedure: Instantaneous Wave-Free Ratio;  Surgeon: Carlos Lepe MD;  Location:  [facility] CATH LAB    ORTHOPEDIC SURGERY  2003        Family History:   Family History   Problem Relation Age of Onset    Alzheimer Disease Mother     Heart Disease Father     Coronary Artery Disease Father     Diabetes Sister     Coronary Artery Disease Brother     Diabetes Brother        Social History:  Social History      Socioeconomic History    Marital status:      Spouse name: Not on file    Number of children: Not on file    Years of education: Not on file    Highest education level: Not on file   Occupational History    Not on file   Tobacco Use    Smoking status: Never    Smokeless tobacco: Never    Tobacco comments:     Occ. cigar    Vaping Use    Vaping status: Never Used   Substance and Sexual Activity    Alcohol use: Yes    Drug use: Not Currently     Types: Marijuana    Sexual activity: Not Currently     Partners: Female     Birth control/protection: Post-menopausal, Male Surgical   Other Topics Concern    Parent/sibling w/ CABG, MI or angioplasty before 65F 55M? Yes   Social History Narrative    Not on file     Social Drivers of Health     Financial Resource Strain: Low Risk  (10/30/2024)    Financial Resource Strain     Within the past 12 months, have you or your family members you live with been unable to get utilities (heat, electricity) when it was really needed?: No   Food Insecurity: Low Risk  (10/30/2024)    Food Insecurity     Within the past 12 months, did you worry that your food would run out before you got money to buy more?: No     Within the past 12 months, did the food you bought just not last and you didn t have money to get more?: No   Transportation Needs: Low Risk  (10/30/2024)    Transportation Needs     Within the past 12 months, has lack of transportation kept you from medical appointments, getting your medicines, non-medical meetings or appointments, work, or from getting things that you need?: No   Physical Activity: Insufficiently Active (10/30/2024)    Exercise Vital Sign     Days of Exercise per Week: 1 day     Minutes of Exercise per Session: 20 min   Stress: No Stress Concern Present (10/30/2024)    [employer] - Occupational Stress Questionnaire     Feeling of Stress : Not at all   Social Connections: Unknown (10/30/2024)    Social Connection and  Isolation Panel [NHANES]     Frequency of Communication with Friends and Family: Not on file     Frequency of Social Gatherings with Friends and Family: More than three times a week     Attends Mandaeism Services: Not on file     Active Member of Clubs or Organizations: Not on file     Attends Club or Organization Meetings: Not on file     Marital Status: Not on file   Interpersonal Safety: Unknown (2/13/2025)    Interpersonal Safety     Do you feel physically and emotionally safe where you currently live?: Patient unable to answer     Within the past 12 months, have you been hit, slapped, kicked or otherwise physically hurt by someone?: Patient unable to answer     Within the past 12 months, have you been humiliated or emotionally abused in other ways by your partner or ex-partner?: Patient unable to answer   Housing Stability: Low Risk  (10/30/2024)    Housing Stability     Do you have housing? : Yes     Are you worried about losing your housing?: No       Allergies:     Allergies   Allergen Reactions    Jardiance [Empagliflozin]      rash       Medications:  Current Outpatient Medications   Medication Sig Dispense Refill    aspirin (ASA) 81 MG tablet Take 1 tablet (81 mg) by mouth daily 1 tablet 3    atorvastatin (LIPITOR) 10 MG tablet Take 1 tablet (10 mg) by mouth daily or cholesterol/prevents hearts attacks/strokes 90 tablet 3    atorvastatin (LIPITOR) 80 MG tablet Take 1 tablet (80 mg) by mouth daily. 90 tablet 3    blood glucose (NO BRAND SPECIFIED) lancets standard Use to test blood sugar one time daily or as directed. 100 each 3    blood glucose (ONETOUCH ULTRA) test strip USE TO TEST BLOOD SUGAR ONE TIME DAILY OR AS DIRECTED. 100 strip 1    blood glucose monitoring (ONE TOUCH ULTRA 2) meter device kit Use to test blood sugar one time daily or as directed. 1 kit 0    Lancets (ONETOUCH DELICA PLUS [id number]) MISC USE TO TEST BLOOD SUGAR ONE TIME DAILY OR AS DIRECTED.      losartan-hydrochlorothiazide  (HYZAAR) 100-25 MG tablet Take 1 tablet by mouth daily. For blood pressure in AM. This is a combination pill of cozaar and hydrochlorothiazide. Finish individual pills first 90 tablet 1    metFORMIN (GLUCOPHAGE XR) 500 MG 24 hr tablet TAKE 1 TAB  BY MOUTH DAILY (WITH BREAKFAST) AND 1 TAB DAILY (WITH DINNER). This is half dosage 180 tablet 1    sitagliptin (JANUVIA) 25 MG tablet Take 1 tablet (25 mg) by mouth daily. 30 tablet 3    bisacodyl (DULCOLAX) 5 MG EC tablet Take 2 tablets at 3 pm the day before your procedure. If your procedure is before 11 am, take 2 additional tablets at 11 pm. If your procedure is after 11 am, take 2 additional tablets at 6 am. For additional instructions refer to your colonoscopy prep instructions. (Patient not taking: Reported on 12/1/2022) 4 tablet 0    polyethylene glycol (GOLYTELY) 236 g suspension The night before the exam at 6 pm drink an 8-ounce glass every 15 minutes until the jug is half empty. If you arrive before 11 AM: Drink the other half of the Golytely jug at 11 PM night before procedure. If you arrive after 11 AM: Drink the other half of the Golytely jug at 6 AM day of procedure. For additional instructions refer to your colonoscopy prep instructions. (Patient not taking: Reported on 12/1/2022) 4000 mL 0     No current facility-administered medications for this visit.       Review of Systems: A 10 point ROS was performed and is negative other than HPI.    Physical Exam:  Pulse:  [80] 80  BP: (122)/(85) 122/85  SpO2:  [97 %] 97 %    Gen: NAD, resting comfortably in chair   Lungs: CTAB, non-labored breathing   CV: regular rhythm, normal rate   Abd: Soft, not tender, not distended   Ext: Motor, sensation, pulses intact   Neuro: AOx3    Labs:  Lab Results   Component Value Date    WBC 8.7 02/13/2025     Lab Results   Component Value Date    RBC 5.11 02/13/2025     Lab Results   Component Value Date    HGB 15.0 02/13/2025     Lab Results   Component Value Date    HCT 46.0  "02/13/2025     No components found for: \"MCT\"  Lab Results   Component Value Date    MCV 90 02/13/2025     Lab Results   Component Value Date    MCH 29.4 02/13/2025     Lab Results   Component Value Date    MCHC 32.6 02/13/2025     Lab Results   Component Value Date    RDW 13.6 02/13/2025     Lab Results   Component Value Date     02/13/2025     Last Comprehensive Metabolic Panel:  Lab Results   Component Value Date     02/13/2025    POTASSIUM 4.4 02/13/2025    CHLORIDE 102 02/13/2025    CO2 23 02/13/2025    ANIONGAP 15 02/13/2025     (H) 02/13/2025    BUN 21.6 02/13/2025    CR 0.85 02/13/2025    GFRESTIMATED >90 02/13/2025    MAK 9.5 02/13/2025         Imaging:  Transthoracic echocardiogram (1/2/25):  Interpretation Summary     Global and regional left ventricular function is normal with an EF of 55-60%.  Mild concentric wall thickening consistent with left ventricular hypertrophy  is present.  Global right ventricular function is normal.  No significant valvular abnormalities were noted.  Dilated aortic root and ascending aorta measuring 4.4 cm (2.0 cm/m2) and 4.4  cm (2.0 cm/m2 respectively.) Recommend CTA or MRA chest to better assess  aortic dimensions.  Previous study not available for comparison.  _________________________________________    Coronary angiogram (2/13/25):  Coronary Findings    Diagnostic  Dominance: Right  Left Main   The vessel is large.   Mid LM to Dist LM lesion is 20% stenosed.      Left Anterior Descending   The vessel is moderate in size.   Prox LAD to Mid LAD lesion is 40% stenosed. The lesion is irregular. The lesion is calcified. Pressure wire/iFR used. dPR was measured as 0.89   Mid LAD lesion is 70% stenosed. Measured dPr. Pre adenosine administration dPr: 0.89.      First Diagonal Branch   The vessel is large.   1st Diag lesion is 70% stenosed.      Second Diagonal Branch   The vessel is moderate in size.   2nd Diag-1 lesion is 70% stenosed.   2nd Diag-2 lesion " is 70% stenosed.      Ramus Intermedius   The vessel is large.   Ramus lesion is 60% stenosed.      Left Circumflex   Prox Cx lesion is 75% stenosed.      First Obtuse Marginal Branch   The vessel is moderate in size and is angiographically normal.      Second Obtuse Marginal Branch   The vessel is small.   2nd Mrg lesion is 60% stenosed.      Right Coronary Artery   The vessel was visualized by selective angiography and is large. There was 0% mildly ectatic vessel disease.   Prox RCA to Mid RCA lesion is 30% stenosed. The lesion is irregular.      Right Ventricular Branch   The vessel is small.      Right Posterior Descending Artery   The vessel is moderate in size.   RPDA-1 lesion is 60% stenosed.   RPDA-2 lesion is 75% stenosed.      Right Posterior Atrioventricular Artery   The vessel is moderate in size.   RPAV lesion is 90% stenosed.      First Right Posterolateral Branch   The vessel is moderate in size and is angiographically normal.      Second Right Posterolateral Branch   The vessel is small and is angiographically normal.      Third Right Posterolateral Branch   The vessel is small and is angiographically normal.

## 2025-02-21 ENCOUNTER — PREP FOR PROCEDURE (OUTPATIENT)
Dept: CARDIOLOGY | Facility: CLINIC | Age: 70
End: 2025-02-21
Payer: COMMERCIAL

## 2025-02-21 ENCOUNTER — TELEPHONE (OUTPATIENT)
Dept: CARDIOLOGY | Facility: CLINIC | Age: 70
End: 2025-02-21
Payer: COMMERCIAL

## 2025-02-21 DIAGNOSIS — I35.0 AS (AORTIC STENOSIS): Primary | ICD-10-CM

## 2025-02-21 DIAGNOSIS — R73.09 OTHER ABNORMAL GLUCOSE: ICD-10-CM

## 2025-02-21 DIAGNOSIS — R07.89 OTHER CHEST PAIN: ICD-10-CM

## 2025-02-21 DIAGNOSIS — I35.0 AORTIC VALVE STENOSIS, ETIOLOGY OF CARDIAC VALVE DISEASE UNSPECIFIED: Primary | ICD-10-CM

## 2025-02-21 DIAGNOSIS — R09.89 OTHER SPECIFIED SYMPTOMS AND SIGNS INVOLVING THE CIRCULATORY AND RESPIRATORY SYSTEMS: ICD-10-CM

## 2025-02-24 ENCOUNTER — TELEPHONE (OUTPATIENT)
Dept: CARDIOLOGY | Facility: CLINIC | Age: 70
End: 2025-02-24
Payer: COMMERCIAL

## 2025-02-24 ENCOUNTER — HOSPITAL ENCOUNTER (INPATIENT)
Facility: CLINIC | Age: 70
Setting detail: SURGERY ADMIT
End: 2025-02-24
Attending: THORACIC SURGERY (CARDIOTHORACIC VASCULAR SURGERY) | Admitting: THORACIC SURGERY (CARDIOTHORACIC VASCULAR SURGERY)

## 2025-02-24 DIAGNOSIS — I25.10 CAD, MULTIPLE VESSEL: Primary | ICD-10-CM

## 2025-02-25 NOTE — TELEPHONE ENCOUNTER
FUTURE VISIT INFORMATION      SURGERY INFORMATION:  Date: 3/24/25  Location: [facility] OR  Surgeon:  Miguel Barnes MD   Anesthesia Type:  general  Procedure: AORTIC VALVE REPLACEMENT AND ANY ASSOCIATED PROCEDURES   Consult: ov 25    RECORDS REQUESTED FROM:       Primary Care Provider: Martin Marsh MD - [facility]    Pertinent Medical History: CAD    Most recent EKG+ Tracin25    Most recent ECHO: 25    Most recent Coronary Angiogram: 25

## 2025-02-26 ENCOUNTER — TELEPHONE (OUTPATIENT)
Dept: CARDIOLOGY | Facility: CLINIC | Age: 70
End: 2025-02-26

## 2025-02-27 ENCOUNTER — PREP FOR PROCEDURE (OUTPATIENT)
Dept: CARDIOLOGY | Facility: CLINIC | Age: 70
End: 2025-02-27
Payer: COMMERCIAL

## 2025-03-02 LAB
ABO + RH BLD: NORMAL
BLD GP AB SCN SERPL QL: NEGATIVE
SPECIMEN EXP DATE BLD: NORMAL

## 2025-03-03 ENCOUNTER — OFFICE VISIT (OUTPATIENT)
Dept: SURGERY | Facility: CLINIC | Age: 70
End: 2025-03-03
Payer: COMMERCIAL

## 2025-03-03 ENCOUNTER — LAB (OUTPATIENT)
Dept: LAB | Facility: CLINIC | Age: 70
End: 2025-03-03
Payer: COMMERCIAL

## 2025-03-03 ENCOUNTER — ANCILLARY PROCEDURE (OUTPATIENT)
Dept: GENERAL RADIOLOGY | Facility: CLINIC | Age: 70
End: 2025-03-03
Attending: THORACIC SURGERY (CARDIOTHORACIC VASCULAR SURGERY)
Payer: COMMERCIAL

## 2025-03-03 ENCOUNTER — ANESTHESIA EVENT (OUTPATIENT)
Dept: SURGERY | Facility: CLINIC | Age: 70
End: 2025-03-03

## 2025-03-03 ENCOUNTER — ANCILLARY PROCEDURE (OUTPATIENT)
Dept: ULTRASOUND IMAGING | Facility: CLINIC | Age: 70
End: 2025-03-03
Attending: THORACIC SURGERY (CARDIOTHORACIC VASCULAR SURGERY)
Payer: COMMERCIAL

## 2025-03-03 ENCOUNTER — APPOINTMENT (OUTPATIENT)
Dept: LAB | Facility: CLINIC | Age: 70
End: 2025-03-03
Attending: THORACIC SURGERY (CARDIOTHORACIC VASCULAR SURGERY)
Payer: COMMERCIAL

## 2025-03-03 ENCOUNTER — PRE VISIT (OUTPATIENT)
Dept: SURGERY | Facility: CLINIC | Age: 70
End: 2025-03-03

## 2025-03-03 VITALS
HEART RATE: 86 BPM | DIASTOLIC BLOOD PRESSURE: 73 MMHG | RESPIRATION RATE: 16 BRPM | HEIGHT: 73 IN | OXYGEN SATURATION: 98 % | SYSTOLIC BLOOD PRESSURE: 124 MMHG | BODY MASS INDEX: 29.03 KG/M2 | TEMPERATURE: 98.4 F | WEIGHT: 219 LBS

## 2025-03-03 DIAGNOSIS — I35.0 AORTIC VALVE STENOSIS, ETIOLOGY OF CARDIAC VALVE DISEASE UNSPECIFIED: ICD-10-CM

## 2025-03-03 DIAGNOSIS — I25.10 CORONARY ARTERY DISEASE INVOLVING NATIVE CORONARY ARTERY OF NATIVE HEART, UNSPECIFIED WHETHER ANGINA PRESENT: ICD-10-CM

## 2025-03-03 DIAGNOSIS — R07.89 OTHER CHEST PAIN: ICD-10-CM

## 2025-03-03 DIAGNOSIS — R09.89 OTHER SPECIFIED SYMPTOMS AND SIGNS INVOLVING THE CIRCULATORY AND RESPIRATORY SYSTEMS: ICD-10-CM

## 2025-03-03 DIAGNOSIS — Z01.818 PRE-OP EVALUATION: Primary | ICD-10-CM

## 2025-03-03 DIAGNOSIS — R73.09 OTHER ABNORMAL GLUCOSE: ICD-10-CM

## 2025-03-03 LAB
ALBUMIN SERPL BCG-MCNC: 4.4 G/DL (ref 3.5–5.2)
ALBUMIN UR-MCNC: 30 MG/DL
ALP SERPL-CCNC: 68 U/L (ref 40–150)
ALT SERPL W P-5'-P-CCNC: 18 U/L (ref 0–70)
ANION GAP SERPL CALCULATED.3IONS-SCNC: 12 MMOL/L (ref 7–15)
APPEARANCE UR: ABNORMAL
APTT PPP: 28 SECONDS (ref 22–38)
AST SERPL W P-5'-P-CCNC: 17 U/L (ref 0–45)
ATRIAL RATE - MUSE: 92 BPM
BACTERIA #/AREA URNS HPF: ABNORMAL /HPF
BILIRUB SERPL-MCNC: 0.4 MG/DL
BILIRUB UR QL STRIP: NEGATIVE
BUN SERPL-MCNC: 20.5 MG/DL (ref 8–23)
CALCIUM SERPL-MCNC: 9.1 MG/DL (ref 8.8–10.4)
CHLORIDE SERPL-SCNC: 101 MMOL/L (ref 98–107)
COLOR UR AUTO: YELLOW
CREAT SERPL-MCNC: 0.81 MG/DL (ref 0.67–1.17)
DIASTOLIC BLOOD PRESSURE - MUSE: NORMAL MMHG
EGFRCR SERPLBLD CKD-EPI 2021: >90 ML/MIN/1.73M2
ERYTHROCYTE [DISTWIDTH] IN BLOOD BY AUTOMATED COUNT: 13.2 % (ref 10–15)
EST. AVERAGE GLUCOSE BLD GHB EST-MCNC: 200 MG/DL
GLUCOSE SERPL-MCNC: 223 MG/DL (ref 70–99)
GLUCOSE UR STRIP-MCNC: NEGATIVE MG/DL
HBA1C MFR BLD: 8.6 %
HCO3 SERPL-SCNC: 24 MMOL/L (ref 22–29)
HCT VFR BLD AUTO: 44.1 % (ref 40–53)
HGB BLD-MCNC: 14.9 G/DL (ref 13.3–17.7)
HGB UR QL STRIP: ABNORMAL
INR PPP: 1.04 (ref 0.85–1.15)
INTERPRETATION ECG - MUSE: NORMAL
KETONES UR STRIP-MCNC: NEGATIVE MG/DL
LEUKOCYTE ESTERASE UR QL STRIP: ABNORMAL
MAGNESIUM SERPL-MCNC: 1.8 MG/DL (ref 1.7–2.3)
MCH RBC QN AUTO: 29.4 PG (ref 26.5–33)
MCHC RBC AUTO-ENTMCNC: 33.8 G/DL (ref 31.5–36.5)
MCV RBC AUTO: 87 FL (ref 78–100)
MUCOUS THREADS #/AREA URNS LPF: PRESENT /LPF
NITRATE UR QL: POSITIVE
P AXIS - MUSE: 39 DEGREES
PH UR STRIP: 7 [PH] (ref 5–7)
PLATELET # BLD AUTO: 220 10E3/UL (ref 150–450)
POTASSIUM SERPL-SCNC: 4 MMOL/L (ref 3.4–5.3)
PR INTERVAL - MUSE: 160 MS
PREALB SERPL-MCNC: 29.8 MG/DL (ref 20–40)
PROT SERPL-MCNC: 7.8 G/DL (ref 6.4–8.3)
QRS DURATION - MUSE: 78 MS
QT - MUSE: 370 MS
QTC - MUSE: 457 MS
R AXIS - MUSE: -9 DEGREES
RBC # BLD AUTO: 5.06 10E6/UL (ref 4.4–5.9)
RBC URINE: 8 /HPF
SODIUM SERPL-SCNC: 137 MMOL/L (ref 135–145)
SP GR UR STRIP: 1.03 (ref 1–1.03)
SYSTOLIC BLOOD PRESSURE - MUSE: NORMAL MMHG
T AXIS - MUSE: 5 DEGREES
UROBILINOGEN UR STRIP-MCNC: NORMAL MG/DL
VENTRICULAR RATE- MUSE: 92 BPM
WBC # BLD AUTO: 10 10E3/UL (ref 4–11)
WBC URINE: >182 /HPF

## 2025-03-03 PROCEDURE — 80053 COMPREHEN METABOLIC PANEL: CPT | Performed by: PATHOLOGY

## 2025-03-03 PROCEDURE — 85027 COMPLETE CBC AUTOMATED: CPT | Performed by: PATHOLOGY

## 2025-03-03 PROCEDURE — 99000 SPECIMEN HANDLING OFFICE-LAB: CPT | Performed by: PATHOLOGY

## 2025-03-03 PROCEDURE — 87186 SC STD MICRODIL/AGAR DIL: CPT | Performed by: THORACIC SURGERY (CARDIOTHORACIC VASCULAR SURGERY)

## 2025-03-03 PROCEDURE — 85730 THROMBOPLASTIN TIME PARTIAL: CPT | Performed by: PATHOLOGY

## 2025-03-03 PROCEDURE — 84134 ASSAY OF PREALBUMIN: CPT | Performed by: THORACIC SURGERY (CARDIOTHORACIC VASCULAR SURGERY)

## 2025-03-03 PROCEDURE — 99203 OFFICE O/P NEW LOW 30 MIN: CPT | Performed by: PHYSICIAN ASSISTANT

## 2025-03-03 PROCEDURE — 85610 PROTHROMBIN TIME: CPT | Performed by: PATHOLOGY

## 2025-03-03 PROCEDURE — 36415 COLL VENOUS BLD VENIPUNCTURE: CPT | Performed by: PATHOLOGY

## 2025-03-03 PROCEDURE — 81001 URINALYSIS AUTO W/SCOPE: CPT | Performed by: PATHOLOGY

## 2025-03-03 PROCEDURE — 93880 EXTRACRANIAL BILAT STUDY: CPT | Performed by: RADIOLOGY

## 2025-03-03 PROCEDURE — 71046 X-RAY EXAM CHEST 2 VIEWS: CPT | Mod: GC | Performed by: RADIOLOGY

## 2025-03-03 PROCEDURE — 86850 RBC ANTIBODY SCREEN: CPT

## 2025-03-03 PROCEDURE — 83036 HEMOGLOBIN GLYCOSYLATED A1C: CPT | Performed by: THORACIC SURGERY (CARDIOTHORACIC VASCULAR SURGERY)

## 2025-03-03 PROCEDURE — 86900 BLOOD TYPING SEROLOGIC ABO: CPT

## 2025-03-03 PROCEDURE — 83735 ASSAY OF MAGNESIUM: CPT | Performed by: PATHOLOGY

## 2025-03-03 ASSESSMENT — LIFESTYLE VARIABLES: TOBACCO_USE: 0

## 2025-03-03 ASSESSMENT — ENCOUNTER SYMPTOMS: DYSRHYTHMIAS: 0

## 2025-03-03 ASSESSMENT — PAIN SCALES - GENERAL: PAINLEVEL_OUTOF10: NO PAIN (0)

## 2025-03-03 NOTE — PATIENT INSTRUCTIONS
Preparing for Your Surgery      Name:  Rohit William   MRN:  [id number]   :  1955   Today's Date:  3/3/2025       Arriving for surgery:  Surgery date:  3-14-25  Arrival time:  5 am    Please come to:     [facility]    [address] [place]  [address]     The [facility] [place] Patient/Visitor Ramp is at [address]. Patients and visitors who self-park will receive the reduced hospital parking rate. If the Patient /Visitor Ramp is full, please follow the signs to the [employer] car park located at the main hospital entrance.       parking is available (24 hours/ 7 days a week)      Discounted parking pass options are available for patients and visitors. They can be purchased at the [employer] desk at the [facility] hospital entrance.     -    Stop at the security desk and they will direct surgery patients to the Surgery Check in and Family Lounge. [phone]        - If you need directions, a wheelchair or an escort please stop at the Information/security desk in the lobby.     What can I eat or drink?  -  You may eat and drink normally up to 8 hours prior to arrival time. (Until 9 pm 3-13-25)  -  You may have clear liquids until 2 hours prior to arrival time. (Until 3 am )    Examples of clear liquids:  Water  Clear broth  Juices (apple, white grape, white cranberry  and cider) without pulp  Noncarbonated, powder based beverages  (lemonade and Corey-Aid)  Sodas (Sprite, 7-Up, ginger ale and seltzer)  Coffee or tea (without milk or cream)  Gatorade    -  No Alcohol or cannabis products for at least 24 hours before surgery.     Which medicines can I take?    Hold Multivitamins for 7 days before surgery.  Hold Supplements for 7 days before surgery.  Hold Ibuprofen (Advil, Motrin) for 7 days before surgery--unless otherwise directed by surgeon.  Hold Naproxen (Aleve) for 7 days before  surgery.  Acetaminophen (Tylenol) is okay to take if needed.    Hold Losartan-Hydrochlorothiazide (Hyzaar) for 48 hours prior to surgery. Take the last Losartan-Hydrochlorothiazide on 3-11-25, and then hold until surgery.    Take the last dose of Aspirin on 3-13-25, and then hold until surgery.    -  DO NOT take these medications the day of surgery:  Aspirin  Metformin (Glucophage XR)  Sitagliptin (Januvia)      -  PLEASE TAKE these medications the day of surgery:  Atorvastatin (Lipitor)    How do I prepare myself?  - Please take 2 showers (one the night prior to surgery and one the morning of surgery) using Scrubcare or Hibiclens soap.    Use this soap only from the neck to your toes. Avoid genital area      Leave the soap on your skin for one minute--then rinse thoroughly.      You may use your own shampoo and conditioner. No other hair products.   - Please remove all jewelry and body piercings.  - No lotions, deodorants or fragrance.  - No makeup or fingernail polish.   - Bring your ID and insurance card.    -For patients being admitted to the [facility]  Family members are to take the patient belongings with them and place them in the lockers provided in the Family Lounge.  Please limit the items you bring to 1 bag as the lockers are small.      -If you use a CPAP machine, please bring the CPAP machine, tubing, and mask to hospital.    -If you have a Deep Brain Stimulator, Spinal Cord Stimulator, or any Neuro Stimulator device---you must bring the remote control to the hospital.      ALL PATIENTS GOING HOME THE SAME DAY OF SURGERY ARE REQUIRED TO HAVE A RESPONSIBLE ADULT TO DRIVE AND BE IN ATTENDANCE WITH THEM FOR 24 HOURS FOLLOWING SURGERY.    Covid testing policy as of 12/06/2022  Your surgeon will notify and schedule you for a COVID test if one is needed before surgery--please direct any questions or COVID symptoms to your surgeon      Questions or Concerns:    - For any questions regarding the day of  surgery or your hospital stay, please contact the Pre Admission Nursing Office at [phone].       - If you have health changes between today and your surgery, please call your surgeon.       - For questions after surgery, please call your surgeons office.           Current Visitor Guidelines    2 adult visitors for adult patients in the pre op area    If additional visitors come (beyond a patient care attendant or a group home caregiver), the additional visitors will be asked to wait in the main lobby of the hospital    Visiting hours: 8 a.m. to 8:30 p.m.    Patients confirmed or suspected to have symptoms of COVID 19 or flu:     No visitors allowed for adult patients.   Children (under age 18) can have 1 named visitor.     People who are sick or showing symptoms of COVID 19 or flu:    Are not allowed to visit patients--we can only make exceptions in special situations.       Please follow these guidelines for your visit:          Please maintain social distance          Masking is optional--however at times you may be asked to wear a mask for the safety of yourself and others     Clean your hands with alcohol hand . Do this when you arrive at and leave the building and patient room,    And again after you touch your mask or anything in the room.     Go directly to and from the room you are visiting.     Stay in the patient s room during your visit. Limit going to other places in the hospital as much as possible     Leave bags and jackets at home or in the car.     For everyone s health, please don t come and go during your visit. That includes for smoking   during your visit.

## 2025-03-03 NOTE — H&P
Pre-Operative H & P     CC:  Preoperative exam to assess for increased cardiopulmonary risk while undergoing surgery and anesthesia.    Date of Encounter: 3/3/2025  Primary Care Physician:  Martin Marsh     Reason for visit:   Encounter Diagnoses   Name Primary?    Pre-op evaluation Yes    Coronary artery disease involving native coronary artery of native heart, unspecified whether angina present        HPI  Rohit William is a 69 year old male who presents for pre-operative H & P in preparation for  Procedure Information       Case: [id number] Date/Time: 03/14/25 0730    Procedure: CORONARY ARTERY BYPASS GRAFT AND ANY ASSOCIATED PROCEDURES (Chest)    Anesthesia type: General    Diagnosis: AS (aortic stenosis) [I35.0]    Pre-op diagnosis: AS (aortic stenosis) [I35.0]    Location:  OR  /  OR    Providers: Miguel Barnes MD            Patient is being evaluated for comorbid conditions of HTN, diabetes, and obesity.    He was recently found to have severe multivessel coronary artery disease after undergoing an evaluation due to strong family history of early cardiac disease. He has completed a consultation with Dr. Barnes and is now scheduled for surgical intervention as above.     History is obtained from the patient and chart review    Hx of abnormal bleeding or anti-platelet use: ASA 81 mg      Past Medical History  Past Medical History:   Diagnosis Date    Diabetes (H)     Hypertension        Past Surgical History  Past Surgical History:   Procedure Laterality Date    COLONOSCOPY N/A 12/07/2022    Procedure: COLONOSCOPY, FLEXIBLE, WITH LESION REMOVAL USING SNARE;  Surgeon: Gomez Zurita MD;  Location: [facility] OR    COLONOSCOPY WITH CO2 INSUFFLATION N/A 12/07/2022    Procedure: COLONOSCOPY, WITH CO2 INSUFFLATION;  Surgeon: Gomez Zurita MD;  Location: [facility]    CV CORONARY ANGIOGRAM N/A 02/13/2025    Procedure: Coronary Angiogram;  Surgeon: Carlos Lepe MD;   Location:  [facility]    CV INSTANTANEOUS WAVE-FREE RATIO N/A 02/13/2025    Procedure: Instantaneous Wave-Free Ratio;  Surgeon: Carlos Lepe MD;  Location:  [facility]    ORTHOPEDIC SURGERY Left 2003       Prior to Admission Medications  Current Outpatient Medications   Medication Sig Dispense Refill    aspirin (ASA) 81 MG tablet Take 81 mg by mouth every morning. 1 tablet 3    atorvastatin (LIPITOR) 80 MG tablet Take 1 tablet (80 mg) by mouth daily. (Patient taking differently: Take 80 mg by mouth every morning.) 90 tablet 3    losartan-hydrochlorothiazide (HYZAAR) 100-25 MG tablet Take 1 tablet by mouth daily. For blood pressure in AM. This is a combination pill of cozaar and hydrochlorothiazide. Finish individual pills first (Patient taking differently: Take 1 tablet by mouth every morning. For blood pressure in AM. This is a combination pill of cozaar and hydrochlorothiazide. Finish individual pills first) 90 tablet 1    metFORMIN (GLUCOPHAGE XR) 500 MG 24 hr tablet TAKE 1 TAB  BY MOUTH DAILY (WITH BREAKFAST) AND 1 TAB DAILY (WITH DINNER). This is half dosage (Patient taking differently: Take 500 mg by mouth 2 times daily (with meals). TAKE 1 TAB  BY MOUTH DAILY (WITH BREAKFAST) AND 1 TAB DAILY (WITH DINNER). This is half dosage) 180 tablet 1    sitagliptin (JANUVIA) 25 MG tablet Take 1 tablet (25 mg) by mouth daily. (Patient taking differently: Take 25 mg by mouth every morning.) 30 tablet 3    atorvastatin (LIPITOR) 10 MG tablet Take 1 tablet (10 mg) by mouth daily or cholesterol/prevents hearts attacks/strokes (Patient not taking: Reported on 3/3/2025) 90 tablet 3    blood glucose (NO BRAND SPECIFIED) lancets standard Use to test blood sugar one time daily or as directed. 100 each 3    blood glucose (ONETOUCH ULTRA) test strip USE TO TEST BLOOD SUGAR ONE TIME DAILY OR AS DIRECTED. 100 strip 1    blood glucose monitoring (ONE TOUCH ULTRA 2) meter device kit Use to  test blood sugar one time daily or as directed. 1 kit 0    Lancets (ONETOUCH DELICA PLUS [id number]) MISC USE TO TEST BLOOD SUGAR ONE TIME DAILY OR AS DIRECTED.         Allergies  Allergies   Allergen Reactions    Jardiance [Empagliflozin]      rash       Social History  Social History     Socioeconomic History    Marital status:      Spouse name: Not on file    Number of children: Not on file    Years of education: Not on file    Highest education level: Not on file   Occupational History    Not on file   Tobacco Use    Smoking status: Never    Smokeless tobacco: Never   Vaping Use    Vaping status: Never Used   Substance and Sexual Activity    Alcohol use: Not Currently     Comment: 1-2 drinks month    Drug use: Not Currently     Types: Marijuana    Sexual activity: Not Currently     Partners: Female     Birth control/protection: Post-menopausal, Male Surgical   Other Topics Concern    Parent/sibling w/ CABG, MI or angioplasty before 65F 55M? Yes   Social History Narrative    Not on file     Social Drivers of Health     Financial Resource Strain: Low Risk  (10/30/2024)    Financial Resource Strain     Within the past 12 months, have you or your family members you live with been unable to get utilities (heat, electricity) when it was really needed?: No   Food Insecurity: Low Risk  (10/30/2024)    Food Insecurity     Within the past 12 months, did you worry that your food would run out before you got money to buy more?: No     Within the past 12 months, did the food you bought just not last and you didn t have money to get more?: No   Transportation Needs: Low Risk  (10/30/2024)    Transportation Needs     Within the past 12 months, has lack of transportation kept you from medical appointments, getting your medicines, non-medical meetings or appointments, work, or from getting things that you need?: No   Physical Activity: Insufficiently Active (10/30/2024)    Exercise Vital Sign     Days of Exercise per Week:  1 day     Minutes of Exercise per Session: 20 min   Stress: No Stress Concern Present (10/30/2024)    [employer] [place] [employer] - Occupational Stress Questionnaire     Feeling of Stress : Not at all   Social Connections: Unknown (10/30/2024)    Social Connection and Isolation Panel [NHANES]     Frequency of Communication with Friends and Family: Not on file     Frequency of Social Gatherings with Friends and Family: More than three times a week     Attends Yazidism Services: Not on file     Active Member of Clubs or Organizations: Not on file     Attends Club or Organization Meetings: Not on file     Marital Status: Not on file   Interpersonal Safety: Unknown (2/13/2025)    Interpersonal Safety     Do you feel physically and emotionally safe where you currently live?: Patient unable to answer     Within the past 12 months, have you been hit, slapped, kicked or otherwise physically hurt by someone?: Patient unable to answer     Within the past 12 months, have you been humiliated or emotionally abused in other ways by your partner or ex-partner?: Patient unable to answer   Housing Stability: Low Risk  (10/30/2024)    Housing Stability     Do you have housing? : Yes     Are you worried about losing your housing?: No       Family History  Family History   Problem Relation Age of Onset    Alzheimer Disease Mother     Heart Disease Father     Coronary Artery Disease Father     Diabetes Sister     Pulmonary Embolism Sister     Coronary Artery Disease Brother     Diabetes Brother     Anesthesia Reaction No family hx of        Review of Systems  The complete review of systems is negative other than noted in the HPI or here.   Anesthesia Evaluation   Pt has had prior anesthetic.     No history of anesthetic complications       ROS/MED HX  ENT/Pulmonary:     (+)     LIA risk factors,  hypertension,                              (-) tobacco use, asthma and recent URI   Neurologic:  - neg neurologic ROS    "  Cardiovascular:     (+)  hypertension- -  CAD -  - -                                 Previous cardiac testing   Echo: Date: 1/2/25 Results:  See H&P  Stress Test:  Date: Results:    ECG Reviewed:  Date: 2/13/25 Results:  Sinus rhythm with Premature atrial complexes   Minimal voltage criteria for LVH, may be normal variant ( R in aVL )   Inferior infarct (cited on or before 10-Dec-2024)   Abnormal ECG   When compared with ECG of 10-Dec-2024 17:17,   Premature atrial complexes are now Present     Cath:  Date: 2/13/25 Results:  Conclusion    Severe three vessel CAD involving a severe stenosis of the mid LAD, D1 and D2, mid LCx, ramus intermedius, as well as rPL and rPDA.  dPR of the mid LAD hemodynamically significant at 0.89.   (-) angina, arrhythmias and angina   METS/Exercise Tolerance: >4 METS Comment: Walking, managing a horse farm   Hematologic:  - neg hematologic  ROS     Musculoskeletal: Comment: S/p left knee surgery (2003)  (+)  arthritis (hands),             GI/Hepatic:  - neg GI/hepatic ROS     Renal/Genitourinary:    (-) renal disease   Endo:     (+)  type II DM, Last HgA1c: 7.4, date: 10/31/24, Not using insulin,  Normal glucose range: Avg 165 in the morning, not previously admitted for DM/DKA.       Obesity,    (-) thyroid disease   Psychiatric/Substance Use:  - neg psychiatric ROS     Infectious Disease:  - neg infectious disease ROS     Malignancy:  - neg malignancy ROS     Other:            /73 (BP Location: Right arm, Patient Position: Sitting, Cuff Size: Adult Large)   Pulse 86   Temp 98.4  F (36.9  C) (Oral)   Resp 16   Ht 1.854 m (6' 1\")   Wt 99.3 kg (219 lb)   SpO2 98%   BMI 28.89 kg/m      Physical Exam   Constitutional: Pleasant, well-appearing, no apparent distress, and appears stated age.  Eyes: Pupils equal, round and reactive to light, extra ocular muscles intact, sclera clear, conjunctiva normal.  HENT: Normocephalic and atraumatic, oral pharynx with moist mucus membranes, " good dentition. No goiter appreciated.   Respiratory: Clear to auscultation bilaterally, no crackles or wheezing.  Cardiovascular: Regular rate and rhythm, normal S1 and S2, and no murmur noted.  Carotids +2, no bruits. No edema. Palpable pulses to radial  DP and PT arteries.   GI: Non-distended abdomen  Lymph/Hematologic: No cervical lymphadenopathy and no supraclavicular lymphadenopathy.  Genitourinary:  Deferred  Skin: Warm and dry.  No rashes on exposed skin   Musculoskeletal: Full ROM of neck. There is no redness, warmth, or swelling of the visible joints. Gross motor strength is normal.    Neurologic: Awake, alert, oriented to name, place and time. Cranial nerves II-XII are grossly intact. Gait is normal.   Neuropsychiatric: Calm, cooperative. Normal affect.       Prior Labs/Diagnostic Studies   All labs and imaging personally reviewed     EKG/ stress test - if available please see in ROS above   Echo result w/o MOPS: Interpretation Summary Global and regional left ventricular function is normal with an EF of 55-60%.Mild concentric wall thickening consistent with left ventricular hypertrophyis present.Global right ventricular function is normal.No significant valvular abnormalities were noted.Dilated aortic root and ascending aorta measuring 4.4 cm (2.0 cm/m2) and 4.4cm (2.0 cm/m2 respectively.) Recommend CTA or MRA chest to better assessaortic dimensions.Previous study not available for comparison.      CT Angiogram Coronary Arteries 1/31/25  IMPRESSION:  1.  Severe three-vessel coronary artery disease.   2.  Total Agatston score 3671 placing the patient in the 97th  percentile when compared to an age- and gender-matched control group.         No data to display                  The patient's records and results personally reviewed by this provider.     Outside records reviewed from: Care Everywhere    LAB/DIAGNOSTIC STUDIES TODAY:  Per surgery    Assessment  Rohit William is a 69 year old male seen as a PAC  "referral for risk assessment and optimization for anesthesia.    Plan/Recommendations  Pt will be optimized for the proposed procedure.  See below for details on the assessment, risk, and preoperative recommendations    NEUROLOGY  - No history of TIA, CVA or seizure    -Post Op delirium risk factors:  No risk identified    ENT  - No current airway concerns.  Will need to be reassessed day of surgery.  Mallampati: II  TM: > 3    CARDIAC  - No history of Afib  - CAD  Above surgery planned for further management.   - Hypertension  Hold Hyzaar 48 hours prior to surgery.    - METS (Metabolic Equivalents)  Patient performs 4 or more METS exercise without symptoms             Total Score: 0          PULMONARY    LIA Medium Risk             Total Score: 3    LIA: Hypertension    LIA: Over 50 ys old    LIA: Male      - Denies asthma or inhaler use  - Tobacco History    History   Smoking Status    Never   Smokeless Tobacco    Never       GI    PONV Medium Risk  Total Score: 2           1 AN PONV: Patient is not a current smoker    1 AN PONV: Intended Post Op Opioids        /RENAL  - Baseline Creatinine  0.85    ENDOCRINE    - BMI: Estimated body mass index is 28.89 kg/m  as calculated from the following:    Height as of this encounter: 1.854 m (6' 1\").    Weight as of this encounter: 99.3 kg (219 lb).  Overweight (BMI 25.0-29.9)  - Diabetes  Hemoglobin A1C (%)   Date Value   10/31/2024 7.4 (H)   01/10/2019 7.5 (H)     Diabetes Mellitus, Type 2, non-insulin dependent.  Hold Januvia and hold Metformin day of surgery. Recommend close monitoring of the patient's blood glucose levels throughout the perioperative period.    HEME  VTE Medium Risk 1.8%             Total Score: 7    VTE: Greater than 59 yrs old    VTE: Male    VTE: Family Hx of VTE      - Platelet dysfunction secondary to Aspirin ([place], many others). Last dose 3/13 per surgery team.   - A type and screen has been ordered for this patient    MSK  Patient is NOT " Frail             Total Score: 0      - History of left knee surgery  - Arthritis, hands  - Recommend consideration for careful positioning to limit patient discomfort.    Different anesthesia methods/types have been discussed with the patient, but they are aware that the final plan will be decided by the assigned anesthesia provider on the date of service.      The patient is optimized for their procedure. AVS with information on surgery time/arrival time, meds and NPO status given by nursing staff. No further diagnostic testing indicated.      On the day of service:     Prep time: 10 minutes  Visit time: 10 minutes  Documentation time: 10 minutes  ------------------------------------------  Total time: 30 minutes      Geneva Cee PA-C  Preoperative Assessment Center  [facility]  Phone: [phone]  Fax: [phone]

## 2025-03-04 RX ORDER — FAMOTIDINE 20 MG/1
20 TABLET, FILM COATED ORAL
Status: CANCELLED | OUTPATIENT
Start: 2025-03-04

## 2025-03-04 RX ORDER — CEFAZOLIN SODIUM/WATER 2 G/20 ML
2 SYRINGE (ML) INTRAVENOUS
Status: CANCELLED | OUTPATIENT
Start: 2025-03-04

## 2025-03-04 RX ORDER — EPINEPHRINE IN 0.9 % SOD CHLOR 5 MG/250ML
.01-.3 PLASTIC BAG, INJECTION (ML) INTRAVENOUS CONTINUOUS
Status: CANCELLED | OUTPATIENT
Start: 2025-03-14

## 2025-03-04 RX ORDER — HEPARIN SOD,PORCINE/0.9 % NACL 30K/1000ML
INTRAVENOUS SOLUTION INTRAVENOUS
Status: CANCELLED | OUTPATIENT
Start: 2025-03-14

## 2025-03-04 RX ORDER — DEXMEDETOMIDINE HYDROCHLORIDE 4 UG/ML
.1-1.2 INJECTION, SOLUTION INTRAVENOUS CONTINUOUS
Status: CANCELLED | OUTPATIENT
Start: 2025-03-14

## 2025-03-04 RX ORDER — LIDOCAINE 40 MG/G
CREAM TOPICAL
Status: CANCELLED | OUTPATIENT
Start: 2025-03-04

## 2025-03-04 RX ORDER — GABAPENTIN 100 MG/1
100 CAPSULE ORAL
Status: CANCELLED | OUTPATIENT
Start: 2025-03-04

## 2025-03-04 RX ORDER — ASPIRIN 81 MG/1
81 TABLET, CHEWABLE ORAL
Status: CANCELLED | OUTPATIENT
Start: 2025-03-04

## 2025-03-04 RX ORDER — ACETAMINOPHEN 325 MG/1
975 TABLET ORAL ONCE
Status: CANCELLED | OUTPATIENT
Start: 2025-03-04 | End: 2025-03-04

## 2025-03-04 RX ORDER — ASPIRIN 81 MG/1
162 TABLET, CHEWABLE ORAL
Status: CANCELLED | OUTPATIENT
Start: 2025-03-04

## 2025-03-04 RX ORDER — PHENYLEPHRINE HCL IN 0.9% NACL 50MG/250ML
.1-6 PLASTIC BAG, INJECTION (ML) INTRAVENOUS CONTINUOUS
Status: CANCELLED | OUTPATIENT
Start: 2025-03-14

## 2025-03-04 RX ORDER — CHLORHEXIDINE GLUCONATE ORAL RINSE 1.2 MG/ML
10 SOLUTION DENTAL ONCE
Status: CANCELLED | OUTPATIENT
Start: 2025-03-04 | End: 2025-03-04

## 2025-03-04 RX ORDER — CEFAZOLIN SODIUM/WATER 2 G/20 ML
2 SYRINGE (ML) INTRAVENOUS SEE ADMIN INSTRUCTIONS
Status: CANCELLED | OUTPATIENT
Start: 2025-03-04

## 2025-03-06 LAB — BACTERIA UR CULT: ABNORMAL

## 2025-03-12 DIAGNOSIS — E11.69 TYPE 2 DIABETES MELLITUS WITH OTHER SPECIFIED COMPLICATION, UNSPECIFIED WHETHER LONG TERM INSULIN USE (H): ICD-10-CM

## 2025-03-12 RX ORDER — METFORMIN HYDROCHLORIDE 500 MG/1
TABLET, EXTENDED RELEASE ORAL
Qty: 180 TABLET | Refills: 0 | Status: SHIPPED | OUTPATIENT
Start: 2025-03-12

## 2025-03-13 RX ORDER — LIDOCAINE 40 MG/G
CREAM TOPICAL
Status: CANCELLED | OUTPATIENT
Start: 2025-03-13

## 2025-03-13 RX ORDER — SODIUM CHLORIDE, SODIUM LACTATE, POTASSIUM CHLORIDE, CALCIUM CHLORIDE 600; 310; 30; 20 MG/100ML; MG/100ML; MG/100ML; MG/100ML
INJECTION, SOLUTION INTRAVENOUS CONTINUOUS
Status: CANCELLED | OUTPATIENT
Start: 2025-03-13

## 2025-03-13 ASSESSMENT — LIFESTYLE VARIABLES: TOBACCO_USE: 0

## 2025-03-13 ASSESSMENT — ENCOUNTER SYMPTOMS: DYSRHYTHMIAS: 0

## 2025-03-13 NOTE — ANESTHESIA PREPROCEDURE EVALUATION
Anesthesia Pre-Procedure Evaluation    Patient: Rohit William   MRN: [id number] : 1955        Procedure : Procedure(s):  CORONARY ARTERY BYPASS GRAFT AND ANY ASSOCIATED PROCEDURES          Past Medical History:   Diagnosis Date    Diabetes (H)     Hypertension       Past Surgical History:   Procedure Laterality Date    COLONOSCOPY N/A 2022    Procedure: COLONOSCOPY, FLEXIBLE, WITH LESION REMOVAL USING SNARE;  Surgeon: Gomez Zurita MD;  Location: [facility]    COLONOSCOPY WITH CO2 INSUFFLATION N/A 2022    Procedure: COLONOSCOPY, WITH CO2 INSUFFLATION;  Surgeon: Gomez Zurita MD;  Location: [facility]    CV CORONARY ANGIOGRAM N/A 2025    Procedure: Coronary Angiogram;  Surgeon: Carlos Lepe MD;  Location:  [facility]    CV INSTANTANEOUS WAVE-FREE RATIO N/A 2025    Procedure: Instantaneous Wave-Free Ratio;  Surgeon: Carlos Lepe MD;  Location:  [facility]    ORTHOPEDIC SURGERY Left       Allergies   Allergen Reactions    Jardiance [Empagliflozin]      rash      Social History     Tobacco Use    Smoking status: Never    Smokeless tobacco: Never   Substance Use Topics    Alcohol use: Not Currently     Comment: 1-2 drinks month      Wt Readings from Last 1 Encounters:   25 99.3 kg (219 lb)        Anesthesia Evaluation   Pt has had prior anesthetic.     No history of anesthetic complications       ROS/MED HX  ENT/Pulmonary:     (+)     LIA risk factors,  hypertension,                              (-) tobacco use, asthma and recent URI   Neurologic:  - neg neurologic ROS     Cardiovascular:     (+)  hypertension- -  CAD -  - -                                 Previous cardiac testing   Echo: Date: 25 Results:  Global and regional left ventricular function is normal with an EF of 55-60%.  Mild concentric wall thickening consistent with left ventricular hypertrophy  is present.  Global right  ventricular function is normal.  No significant valvular abnormalities were noted.  Dilated aortic root and ascending aorta measuring 4.4 cm (2.0 cm/m2) and 4.4  cm (2.0 cm/m2 respectively.) Recommend CTA or MRA chest to better assess  aortic dimensions.  Previous study not available for comparison.  ______________________________________________________________________________  Left Ventricle  Left ventricular size is normal. Global and regional left ventricular function  is normal with an EF of 55-60%. Biplane LVEF is 56%. Mild concentric wall  thickening consistent with left ventricular hypertrophy is present. Thickening  of the anterobasal septum is present. Left ventricular diastolic function is  indeterminate.     Right Ventricle  The right ventricle is normal size. Global right ventricular function is  normal.     Atria  The right atria appears normal. Mild left atrial enlargement is present.     Mitral Valve  The mitral valve is normal. Trace mitral insufficiency is present.     Aortic Valve  The aortic valve is tricuspid. Trace aortic insufficiency is present.     Tricuspid Valve  The tricuspid valve is normal. Trace tricuspid insufficiency is present. The  right ventricular systolic pressure is approximated at 25.0 mmHg plus the  right atrial pressure.     Pulmonic Valve  The pulmonic valve is normal. Trace pulmonic insufficiency is present.     Vessels  The inferior vena cava was normal in size with preserved respiratory  variability. Aortic root and ascending dilatation is present. Sinuses of  Valsalva 4.4 cm. Ascending aorta 4.4 cm. IVC diameter <2.1 cm collapsing >50%  with sniff suggests a normal RA pressure of 3 mmHg.     Compared to Previous Study  Previous study not available for comparison.     ______________________________________________________________________________  MMode/2D Measurements & Calculations  IVSd: 1.4 cm  LVIDd: 5.0 cm  LVIDs: 3.0 cm  LVPWd: 1.3 cm  FS: 39.2 %     LV mass(C)d:  285.4 grams  LV mass(C)dI: 127.0 grams/m2  Ao root diam: 4.4 cm  asc Aorta Diam: 4.4 cm  LVOT diam: 2.7 cm  LVOT area: 5.9 cm2  Ao root diam index Ht(cm/m): 2.4  Ao root diam index BSA (cm/m2): 2.0  Asc Ao diam index BSA (cm/m2): 2.0  Asc Ao diam index Ht(cm/m): 2.4  EF Biplane: 56.2 %  LA Volume (BP): 75.3 ml     LA Volume Index (BP): 33.5 ml/m2  RV Base: 3.3 cm  RWT: 0.53  TAPSE: 1.7 cm     Doppler Measurements & Calculations  MV E max trudy: 61.7 cm/sec  MV A max trudy: 71.3 cm/sec  MV E/A: 0.87  MV dec time: 0.13 sec  PA acc time: 0.08 sec  TR max trudy: 249.8 cm/sec  TR max P.0 mmHg  E/E' av.4  Lateral E/e': 9.8  Medial E/e': 17.0    Stress Test:  Date: Results:    ECG Reviewed:  Date: 25 Results:  Sinus rhythm with Premature atrial complexes   Minimal voltage criteria for LVH, may be normal variant ( R in aVL )   Inferior infarct (cited on or before 10-Dec-2024)   Abnormal ECG   When compared with ECG of 10-Dec-2024 17:17,   Premature atrial complexes are now Present     Cath:  Date: 25 Results:  Conclusion    Severe three vessel CAD involving a severe stenosis of the mid LAD, D1 and D2, mid LCx, ramus intermedius, as well as rPL and rPDA.  dPR of the mid LAD hemodynamically significant at 0.89.   (-) angina, arrhythmias and angina   METS/Exercise Tolerance: >4 METS Comment: Walking, managing a horse farm   Hematologic:  - neg hematologic  ROS     Musculoskeletal: Comment: S/p left knee surgery ()  (+)  arthritis (hands),             GI/Hepatic:  - neg GI/hepatic ROS     Renal/Genitourinary:    (-) renal disease   Endo:     (+)  type II DM, Last HgA1c: 7.4, date: 10/31/24, Not using insulin,  Normal glucose range: Avg 165 in the morning, not previously admitted for DM/DKA.       Obesity,    (-) thyroid disease   Psychiatric/Substance Use:  - neg psychiatric ROS     Infectious Disease:  - neg infectious disease ROS     Malignancy:  - neg malignancy ROS     Other:               OUTSIDE  "LABS:  CBC:   Lab Results   Component Value Date    WBC 10.0 03/03/2025    WBC 8.7 02/13/2025    HGB 14.9 03/03/2025    HGB 15.0 02/13/2025    HCT 44.1 03/03/2025    HCT 46.0 02/13/2025     03/03/2025     02/13/2025     BMP:   Lab Results   Component Value Date     03/03/2025     02/13/2025    POTASSIUM 4.0 03/03/2025    POTASSIUM 4.4 02/13/2025    CHLORIDE 101 03/03/2025    CHLORIDE 102 02/13/2025    CO2 24 03/03/2025    CO2 23 02/13/2025    BUN 20.5 03/03/2025    BUN 21.6 02/13/2025    CR 0.81 03/03/2025    CR 0.85 02/13/2025     (H) 03/03/2025     (H) 02/13/2025     COAGS:   Lab Results   Component Value Date    PTT 28 03/03/2025    INR 1.04 03/03/2025     POC: No results found for: \"BGM\", \"HCG\", \"HCGS\"  HEPATIC:   Lab Results   Component Value Date    ALBUMIN 4.4 03/03/2025    PROTTOTAL 7.8 03/03/2025    ALT 18 03/03/2025    AST 17 03/03/2025    ALKPHOS 68 03/03/2025    BILITOTAL 0.4 03/03/2025     OTHER:   Lab Results   Component Value Date    A1C 8.6 (H) 03/03/2025    MAK 9.1 03/03/2025    PHOS 4.2 10/31/2024    MAG 1.8 03/03/2025       Anesthesia Plan    ASA Status:  3    NPO Status:  NPO Appropriate    Anesthesia Type: General.     - Airway: ETT   Induction: Propofol, Intravenous.   Maintenance: Balanced.   Techniques and Equipment:     - Airway: Video-Laryngoscope     - Lines/Monitors: Central Line, Arterial Line, BIS, NIRS, CVP     - Blood: Blood in Room     Consents            Postoperative Care    Pain management: IV analgesics, Oral pain medications, Multi-modal analgesia.        Comments:               Rashaad Pearce MD    I have reviewed the pertinent notes and labs in the chart from the past 30 days and (re)examined the patient.  Any updates or changes from those notes are reflected in this note.    Clinically Significant Risk Factors Present on Admission                            # DMII: A1C = 8.6 % (Ref range: <5.7 %) within past 6 months    # " "Overweight: Estimated body mass index is 28.89 kg/m  as calculated from the following:    Height as of 3/3/25: 1.854 m (6' 1\").    Weight as of 3/3/25: 99.3 kg (219 lb).                "

## 2025-03-14 ENCOUNTER — ANESTHESIA (OUTPATIENT)
Dept: SURGERY | Facility: CLINIC | Age: 70
End: 2025-03-14

## 2025-03-17 ENCOUNTER — MYC REFILL (OUTPATIENT)
Dept: FAMILY MEDICINE | Facility: CLINIC | Age: 70
End: 2025-03-17
Payer: COMMERCIAL

## 2025-03-17 ENCOUNTER — PREP FOR PROCEDURE (OUTPATIENT)
Dept: CARDIOLOGY | Facility: CLINIC | Age: 70
End: 2025-03-17
Payer: COMMERCIAL

## 2025-03-17 DIAGNOSIS — I25.10 CAD (CORONARY ARTERY DISEASE): Primary | ICD-10-CM

## 2025-03-17 DIAGNOSIS — E11.69 TYPE 2 DIABETES MELLITUS WITH OTHER SPECIFIED COMPLICATION, UNSPECIFIED WHETHER LONG TERM INSULIN USE (H): ICD-10-CM

## 2025-03-18 ENCOUNTER — HOSPITAL ENCOUNTER (INPATIENT)
Facility: CLINIC | Age: 70
Setting detail: SURGERY ADMIT
End: 2025-03-18
Attending: THORACIC SURGERY (CARDIOTHORACIC VASCULAR SURGERY) | Admitting: THORACIC SURGERY (CARDIOTHORACIC VASCULAR SURGERY)
Payer: COMMERCIAL

## 2025-03-18 DIAGNOSIS — I99.8 OTHER DISORDER OF CIRCULATORY SYSTEM: ICD-10-CM

## 2025-03-18 DIAGNOSIS — I25.10 CAD (CORONARY ARTERY DISEASE): Primary | ICD-10-CM

## 2025-03-18 DIAGNOSIS — I25.10 CAD, MULTIPLE VESSEL: Primary | ICD-10-CM

## 2025-03-20 ENCOUNTER — TELEPHONE (OUTPATIENT)
Dept: CARDIOLOGY | Facility: CLINIC | Age: 70
End: 2025-03-20
Payer: COMMERCIAL

## 2025-03-25 NOTE — CONFIDENTIAL NOTE
FUTURE VISIT INFORMATION      SURGERY INFORMATION:  Date: 4.3.25   Location:  [facility]  Surgeon:  Dr Miguel Barnes   Anesthesia Type:    Procedure: CORONARY ARTERY BYPASS GRAFT AND ANY ASSOCIATED PROCEDURES   Consult: CORONARY ARTERY BYPASS GRAFT AND ANY ASSOCIATED PROCEDURES     RECORDS REQUESTED FROM:       Primary Care Provider:  3.16.25 -CORONARY ARTERY BYPASS GRAFT AND ANY ASSOCIATED PROCEDURES     Pertinent Medical History:  -3.3.25 [place]     Most recent EKG+ Tracing:  -3.3.25 non tracing  - 2.21.25 Tracing     Most recent ECHO:  --1.2.25  Most recent Cardiac Stress Test: n/a     Most recent Coronary Angiogram: n/a     Most recent PFT's: n/a     Most recent Sleep Study: n/a

## 2025-03-27 ENCOUNTER — DOCUMENTATION ONLY (OUTPATIENT)
Dept: OTHER | Facility: CLINIC | Age: 70
End: 2025-03-27
Payer: COMMERCIAL

## 2025-04-02 LAB
ABO + RH BLD: NORMAL
BLD GP AB SCN SERPL QL: NEGATIVE
SPECIMEN EXP DATE BLD: NORMAL

## 2025-04-03 ENCOUNTER — PRE VISIT (OUTPATIENT)
Dept: SURGERY | Facility: CLINIC | Age: 70
End: 2025-04-03

## 2025-04-03 ENCOUNTER — OFFICE VISIT (OUTPATIENT)
Dept: SURGERY | Facility: CLINIC | Age: 70
End: 2025-04-03
Payer: COMMERCIAL

## 2025-04-03 ENCOUNTER — LAB (OUTPATIENT)
Dept: LAB | Facility: CLINIC | Age: 70
End: 2025-04-03
Payer: COMMERCIAL

## 2025-04-03 ENCOUNTER — ANESTHESIA EVENT (OUTPATIENT)
Dept: SURGERY | Facility: CLINIC | Age: 70
End: 2025-04-03

## 2025-04-03 VITALS
OXYGEN SATURATION: 97 % | BODY MASS INDEX: 29.36 KG/M2 | HEART RATE: 80 BPM | HEIGHT: 73 IN | DIASTOLIC BLOOD PRESSURE: 89 MMHG | SYSTOLIC BLOOD PRESSURE: 132 MMHG | RESPIRATION RATE: 16 BRPM | TEMPERATURE: 99.2 F | WEIGHT: 221.5 LBS

## 2025-04-03 DIAGNOSIS — I25.10 CAD (CORONARY ARTERY DISEASE): ICD-10-CM

## 2025-04-03 DIAGNOSIS — I99.8 OTHER DISORDER OF CIRCULATORY SYSTEM: ICD-10-CM

## 2025-04-03 DIAGNOSIS — I25.10 CORONARY ARTERY DISEASE INVOLVING NATIVE CORONARY ARTERY OF NATIVE HEART, UNSPECIFIED WHETHER ANGINA PRESENT: ICD-10-CM

## 2025-04-03 DIAGNOSIS — Z01.818 PREOP EXAMINATION: Primary | ICD-10-CM

## 2025-04-03 LAB
ALBUMIN SERPL BCG-MCNC: 4.5 G/DL (ref 3.5–5.2)
ALBUMIN UR-MCNC: 20 MG/DL
ALP SERPL-CCNC: 64 U/L (ref 40–150)
ALT SERPL W P-5'-P-CCNC: 18 U/L (ref 0–70)
ANION GAP SERPL CALCULATED.3IONS-SCNC: 13 MMOL/L (ref 7–15)
APPEARANCE UR: ABNORMAL
APTT PPP: 27 SECONDS (ref 22–38)
AST SERPL W P-5'-P-CCNC: 16 U/L (ref 0–45)
ATRIAL RATE - MUSE: 81 BPM
BILIRUB SERPL-MCNC: 0.5 MG/DL
BILIRUB UR QL STRIP: NEGATIVE
BUN SERPL-MCNC: 24.8 MG/DL (ref 8–23)
CALCIUM SERPL-MCNC: 9.3 MG/DL (ref 8.8–10.4)
CHLORIDE SERPL-SCNC: 101 MMOL/L (ref 98–107)
COLOR UR AUTO: YELLOW
CREAT SERPL-MCNC: 0.87 MG/DL (ref 0.67–1.17)
DIASTOLIC BLOOD PRESSURE - MUSE: NORMAL MMHG
EGFRCR SERPLBLD CKD-EPI 2021: >90 ML/MIN/1.73M2
ERYTHROCYTE [DISTWIDTH] IN BLOOD BY AUTOMATED COUNT: 13.8 % (ref 10–15)
GLUCOSE SERPL-MCNC: 186 MG/DL (ref 70–99)
GLUCOSE UR STRIP-MCNC: NEGATIVE MG/DL
HCO3 SERPL-SCNC: 24 MMOL/L (ref 22–29)
HCT VFR BLD AUTO: 43.2 % (ref 40–53)
HGB BLD-MCNC: 14.5 G/DL (ref 13.3–17.7)
HGB UR QL STRIP: ABNORMAL
INR PPP: 0.94 (ref 0.85–1.15)
INTERPRETATION ECG - MUSE: NORMAL
KETONES UR STRIP-MCNC: NEGATIVE MG/DL
LEUKOCYTE ESTERASE UR QL STRIP: ABNORMAL
MAGNESIUM SERPL-MCNC: 1.9 MG/DL (ref 1.7–2.3)
MCH RBC QN AUTO: 29.3 PG (ref 26.5–33)
MCHC RBC AUTO-ENTMCNC: 33.6 G/DL (ref 31.5–36.5)
MCV RBC AUTO: 87 FL (ref 78–100)
NITRATE UR QL: NEGATIVE
P AXIS - MUSE: 39 DEGREES
PH UR STRIP: 6.5 [PH] (ref 5–7)
PLATELET # BLD AUTO: 225 10E3/UL (ref 150–450)
POTASSIUM SERPL-SCNC: 3.9 MMOL/L (ref 3.4–5.3)
PR INTERVAL - MUSE: 162 MS
PREALB SERPL-MCNC: 28.7 MG/DL (ref 20–40)
PROT SERPL-MCNC: 7.9 G/DL (ref 6.4–8.3)
QRS DURATION - MUSE: 80 MS
QT - MUSE: 382 MS
QTC - MUSE: 443 MS
R AXIS - MUSE: 68 DEGREES
RBC # BLD AUTO: 4.95 10E6/UL (ref 4.4–5.9)
RBC URINE: 12 /HPF
SODIUM SERPL-SCNC: 138 MMOL/L (ref 135–145)
SP GR UR STRIP: 1.03 (ref 1–1.03)
SQUAMOUS EPITHELIAL: 1 /HPF
SYSTOLIC BLOOD PRESSURE - MUSE: NORMAL MMHG
T AXIS - MUSE: 58 DEGREES
UROBILINOGEN UR STRIP-MCNC: NORMAL MG/DL
VENTRICULAR RATE- MUSE: 81 BPM
WBC # BLD AUTO: 9.5 10E3/UL (ref 4–11)
WBC URINE: >182 /HPF

## 2025-04-03 PROCEDURE — 99000 SPECIMEN HANDLING OFFICE-LAB: CPT | Performed by: PATHOLOGY

## 2025-04-03 PROCEDURE — 86900 BLOOD TYPING SEROLOGIC ABO: CPT

## 2025-04-03 PROCEDURE — 87086 URINE CULTURE/COLONY COUNT: CPT | Performed by: THORACIC SURGERY (CARDIOTHORACIC VASCULAR SURGERY)

## 2025-04-03 PROCEDURE — 84134 ASSAY OF PREALBUMIN: CPT | Performed by: THORACIC SURGERY (CARDIOTHORACIC VASCULAR SURGERY)

## 2025-04-03 PROCEDURE — 86850 RBC ANTIBODY SCREEN: CPT

## 2025-04-03 RX ORDER — ZINC GLUCONATE 50 MG
50 TABLET ORAL DAILY
COMMUNITY

## 2025-04-03 RX ORDER — CHLORAL HYDRATE 500 MG
2 CAPSULE ORAL DAILY
COMMUNITY

## 2025-04-03 RX ORDER — MAGNESIUM OXIDE 400 MG/1
400 TABLET ORAL DAILY
COMMUNITY

## 2025-04-03 ASSESSMENT — ENCOUNTER SYMPTOMS
DYSRHYTHMIAS: 0
SEIZURES: 0

## 2025-04-03 ASSESSMENT — LIFESTYLE VARIABLES: TOBACCO_USE: 1

## 2025-04-03 ASSESSMENT — PAIN SCALES - GENERAL: PAINLEVEL_OUTOF10: NO PAIN (0)

## 2025-04-03 NOTE — H&P
Pre-Operative H & P     CC:  Preoperative exam to assess for increased cardiopulmonary risk while undergoing surgery and anesthesia.    Date of Encounter: 4/3/2025  Primary Care Physician:  Martin Marsh     Reason for visit:   Encounter Diagnoses   Name Primary?    Coronary artery disease involving native coronary artery of native heart, unspecified whether angina present Yes    Preop examination        HPI  Rohit William is a 70 year old male who presents for pre-operative H & P in preparation for  Procedure Information       Case: [id number] Date/Time: 04/10/25 0730    Procedure: CORONARY ARTERY BYPASS GRAFT AND ANY ASSOCIATED PROCEDURES (Chest)    Anesthesia type: General    Diagnosis: CAD (coronary artery disease) [I25.10]    Pre-op diagnosis: CAD (coronary artery disease) [I25.10]    Location:  OR  /  OR    Providers: Miguel Barnes MD            Patient is being evaluated for comorbid conditions of HTN, diabetes, and obesity.     Mr. William was recently found to have severe multivessel coronary artery disease after undergoing an evaluation due to strong family history of early cardiac disease. He has completed a consultation with Dr. Barnes and is now scheduled for surgical intervention as above.     History is obtained from the patient and chart review    Hx of abnormal bleeding or anti-platelet use: on ASA 81 mg      Past Medical History  Past Medical History:   Diagnosis Date    CAD (coronary artery disease)     Diabetes (H)     Hypertension        Past Surgical History  Past Surgical History:   Procedure Laterality Date    COLONOSCOPY N/A 12/07/2022    Procedure: COLONOSCOPY, FLEXIBLE, WITH LESION REMOVAL USING SNARE;  Surgeon: Gomez Zurita MD;  Location: [facility] OR    COLONOSCOPY WITH CO2 INSUFFLATION N/A 12/07/2022    Procedure: COLONOSCOPY, WITH CO2 INSUFFLATION;  Surgeon: Gomez Zurita MD;  Location: [facility] OR    CV CORONARY ANGIOGRAM N/A 02/13/2025    Procedure:  Coronary Angiogram;  Surgeon: Carlos Lepe MD;  Location: [facility] CARDIAC CATH LAB    CV INSTANTANEOUS WAVE-FREE RATIO N/A 02/13/2025    Procedure: Instantaneous Wave-Free Ratio;  Surgeon: Carlos Lepe MD;  Location: [facility] CARDIAC CATH LAB    ORTHOPEDIC SURGERY Left 2003       Prior to Admission Medications  Current Outpatient Medications   Medication Sig Dispense Refill    aspirin (ASA) 81 MG tablet Take 81 mg by mouth every morning. 1 tablet 3    atorvastatin (LIPITOR) 10 MG tablet Take 1 tablet (10 mg) by mouth daily or cholesterol/prevents hearts attacks/strokes (Patient taking differently: Take 10 mg by mouth every morning. or cholesterol/prevents hearts attacks/strokes) 90 tablet 3    atorvastatin (LIPITOR) 80 MG tablet Take 1 tablet (80 mg) by mouth daily. (Patient taking differently: Take 80 mg by mouth every morning.) 90 tablet 3    fish oil-omega-3 fatty acids 1000 MG capsule Take 2 g by mouth daily.      losartan-hydrochlorothiazide (HYZAAR) 100-25 MG tablet Take 1 tablet by mouth daily. For blood pressure in AM. This is a combination pill of cozaar and hydrochlorothiazide. Finish individual pills first (Patient taking differently: Take 1 tablet by mouth every morning. For blood pressure in AM. This is a combination pill of cozaar and hydrochlorothiazide. Finish individual pills first) 90 tablet 1    magnesium oxide (MAG-OX) 400 MG tablet Take 400 mg by mouth daily.      metFORMIN (GLUCOPHAGE XR) 500 MG 24 hr tablet TAKE 1 TAB  BY MOUTH DAILY (WITH BREAKFAST) AND 1 TAB DAILY (WITH DINNER) 180 tablet 0    sitagliptin (JANUVIA) 25 MG tablet Take 1 tablet (25 mg) by mouth daily. (Patient taking differently: Take 25 mg by mouth every morning.) 30 tablet 3    zinc gluconate 50 MG tablet Take 50 mg by mouth daily.      blood glucose (NO BRAND SPECIFIED) lancets standard Use to test blood sugar one time daily or as directed. 100 each 3    blood glucose (ONETOUCH ULTRA)  test strip USE TO TEST BLOOD SUGAR ONE TIME DAILY OR AS DIRECTED. 100 strip 1    blood glucose monitoring (ONE TOUCH ULTRA 2) meter device kit Use to test blood sugar one time daily or as directed. 1 kit 0    Lancets (ONETOUCH DELICA PLUS [id number]) MISC USE TO TEST BLOOD SUGAR ONE TIME DAILY OR AS DIRECTED.         Allergies  Allergies   Allergen Reactions    Jardiance [Empagliflozin]      rash       Social History  Social History     Socioeconomic History    Marital status:      Spouse name: Not on file    Number of children: Not on file    Years of education: Not on file    Highest education level: Not on file   Occupational History    Not on file   Tobacco Use    Smoking status: Some Days     Types: Cigars    Smokeless tobacco: Never    Tobacco comments:     Rare cigar   Vaping Use    Vaping status: Never Used   Substance and Sexual Activity    Alcohol use: Not Currently     Comment: 1-2 drinks month    Drug use: Not Currently     Types: Marijuana    Sexual activity: Not Currently     Partners: Female     Birth control/protection: Post-menopausal, Male Surgical   Other Topics Concern    Parent/sibling w/ CABG, MI or angioplasty before 65F 55M? Yes   Social History Narrative    Not on file     Social Drivers of Health     Financial Resource Strain: Low Risk  (10/30/2024)    Financial Resource Strain     Within the past 12 months, have you or your family members you live with been unable to get utilities (heat, electricity) when it was really needed?: No   Food Insecurity: Low Risk  (10/30/2024)    Food Insecurity     Within the past 12 months, did you worry that your food would run out before you got money to buy more?: No     Within the past 12 months, did the food you bought just not last and you didn t have money to get more?: No   Transportation Needs: Low Risk  (10/30/2024)    Transportation Needs     Within the past 12 months, has lack of transportation kept you from medical appointments, getting  your medicines, non-medical meetings or appointments, work, or from getting things that you need?: No   Physical Activity: Insufficiently Active (10/30/2024)    Exercise Vital Sign     Days of Exercise per Week: 1 day     Minutes of Exercise per Session: 20 min   Stress: No Stress Concern Present (10/30/2024)    [employer] [place] [employer] - Occupational Stress Questionnaire     Feeling of Stress : Not at all   Social Connections: Unknown (10/30/2024)    Social Connection and Isolation Panel [NHANES]     Frequency of Communication with Friends and Family: Not on file     Frequency of Social Gatherings with Friends and Family: More than three times a week     Attends [employer] Services: Not on file     Active Member of Clubs or Organizations: Not on file     Attends Club or Organization Meetings: Not on file     Marital Status: Not on file   Interpersonal Safety: Unknown (2/13/2025)    Interpersonal Safety     Do you feel physically and emotionally safe where you currently live?: Patient unable to answer     Within the past 12 months, have you been hit, slapped, kicked or otherwise physically hurt by someone?: Patient unable to answer     Within the past 12 months, have you been humiliated or emotionally abused in other ways by your partner or ex-partner?: Patient unable to answer   Housing Stability: Low Risk  (10/30/2024)    Housing Stability     Do you have housing? : Yes     Are you worried about losing your housing?: No       Family History  Family History   Problem Relation Age of Onset    Alzheimer Disease Mother     Heart Disease Father     Coronary Artery Disease Father     Diabetes Sister     Pulmonary Embolism Sister     Coronary Artery Disease Brother     Diabetes Brother     Anesthesia Reaction No family hx of        Review of Systems  The complete review of systems is negative other than noted in the HPI or here.     Anesthesia Evaluation   Pt has had prior anesthetic.     No history of  anesthetic complications       ROS/MED HX  ENT/Pulmonary:     (+)     LIA risk factors,  hypertension,         tobacco use (occasional cigar),                     (-) asthma and recent URI   Neurologic:  - neg neurologic ROS  (-) no seizures and no CVA   Cardiovascular:     (+)  hypertension- -  CAD -  - -   Taking blood thinners (ASA 81 mg)                              Previous cardiac testing   Echo: Date: 1/2/25 Results:  See H&P  Stress Test:  Date: Results:    ECG Reviewed:  Date: 3/3/25 Results:  Sinus rhythm with Premature atrial complexes   Minimal voltage criteria for LVH, may be normal variant ( R in aVL )   Inferior infarct (cited on or before 10-Dec-2024)   Abnormal ECG   When compared with ECG of 13-Feb-2025 11:34,   No significant change was found       Cath:  Date: 2/13/25 Results:  Conclusion    Severe three vessel CAD involving a severe stenosis of the mid LAD, D1 and D2, mid LCx, ramus intermedius, as well as rPL and rPDA.  dPR of the mid LAD hemodynamically significant at 0.89.   (-) angina, arrhythmias and angina   METS/Exercise Tolerance: >4 METS Comment: Walking, managing a horse farm   Hematologic:  - neg hematologic  ROS  (-) history of blood clots and history of blood transfusion   Musculoskeletal: Comment: S/p left knee surgery (2003)  (+)  arthritis (hands),             GI/Hepatic:  - neg GI/hepatic ROS  (-) liver disease   Renal/Genitourinary:  - neg Renal ROS  (-) renal disease   Endo:     (+)  type II DM, Last HgA1c: 8.6, date: 3/3/25, Not using insulin,  Normal glucose range: Avg 165 in the morning, not previously admitted for DM/DKA.       Obesity,    (-) thyroid disease   Psychiatric/Substance Use:  - neg psychiatric ROS     Infectious Disease:  - neg infectious disease ROS     Malignancy:  - neg malignancy ROS     Other:  - neg other ROS          /89 (BP Location: Right arm, Patient Position: Sitting, Cuff Size: Adult Regular)   Pulse 80   Temp 99.2  F (37.3  C) (Oral)    "Resp 16   Ht 1.854 m (6' 1\")   Wt 100.5 kg (221 lb 8 oz)   SpO2 97%   BMI 29.22 kg/m      Physical Exam  Constitutional: Awake, alert, cooperative, no apparent distress, and appears stated age.  Eyes: Pupils equal, round and reactive to light, extra ocular muscles intact, sclera clear, conjunctiva normal.  HENT: Normocephalic, oral pharynx with moist mucus membranes, good dentition. No goiter appreciated. No removable dental hardware.  Respiratory: Clear to auscultation bilaterally, no crackles or wheezing. No SOB when supine.  Cardiovascular: Regular rate and rhythm, normal S1 and S2, and no murmur noted.  Carotids +2, no bruits. No edema. Palpable pulses to radial, DP and PT arteries.   GI: Normal bowel sounds, soft, non-distended, non-tender, no masses palpated.    Lymph/Hematologic: No cervical lymphadenopathy and no supraclavicular lymphadenopathy.  Genitourinary:  deferred  Skin: Warm and dry.  No rashes.   Musculoskeletal: full ROM of neck. There is no redness, warmth, or swelling of the joints. Gross motor strength is normal.    Neurologic: Awake, alert, oriented to name, place and time. Cranial nerves II-XII are grossly intact. Gait is normal. Ambulates from chair to exam table, seats self, lies supine and sits back up w/o assistance.  Neuropsychiatric: Calm, cooperative. Normal affect. Answers questions appropriately, follows commands w/o difficulty.        PRIOR LABS/DIAGNOSTIC STUDIES:    All pertinent records, results, labs and imaging personally reviewed        EKG/ heart cath -  please see in ROS above     1/2/25 Echo result w/o MOPS: Interpretation Summary Global and regional left ventricular function is normal with an EF of 55-60%.Mild concentric wall thickening consistent with left ventricular hypertrophyis present.Global right ventricular function is normal.No significant valvular abnormalities were noted.Dilated aortic root and ascending aorta measuring 4.4 cm (2.0 cm/m2) and 4.4cm (2.0 cm/m2 " "respectively.) Recommend CTA or MRA chest to better assessaortic dimensions.Previous study not available for comparison.      LAB/DIAGNOSTIC STUDIES TODAY:  CBC, CMP, T&S, INR, mag, PTT, prealb, UA    Assessment    Rohit William is a 70 year old male seen as a PAC referral for risk assessment and optimization for anesthesia.    Plan/Recommendations  Pt will be optimized for the proposed procedure.  See below for details on the assessment, risk, and preoperative recommendations    NEUROLOGY  - No history of TIA, CVA or seizure    -Post Op delirium risk factors:  No risk identified    ENT  - No current airway concerns.  Will need to be reassessed day of surgery.  Mallampati: II  TM: > 3    CARDIAC  - HTN, hold losartan-hydrochlorothiazide x2d prior    - METS (Metabolic Equivalents)  Walking, managing a horse farm    Patient performs 4 or more METS exercise without symptoms             Total Score: 0      RCRI-Moderate risk: Class 3  6.6% complication rate             Total Score: 2    RCRI: High Risk Surgery    RCRI: CAD        PULMONARY  LIA Medium Risk             Total Score: 3    LIA: Hypertension    LIA: Over 50 ys old    LIA: Male      - Denies asthma or inhaler use  - Tobacco History    History   Smoking Status    Some Days    Types: Cigars   Smokeless Tobacco    Never       GI  - Denies GERD  PONV Medium Risk  Total Score: 2           1 AN PONV: Patient is not a current smoker    1 AN PONV: Intended Post Op Opioids        /RENAL  - Baseline Creatinine  0.85    ENDOCRINE    - BMI: Estimated body mass index is 29.22 kg/m  as calculated from the following:    Height as of this encounter: 1.854 m (6' 1\").    Weight as of this encounter: 100.5 kg (221 lb 8 oz).  Overweight (BMI 25.0-29.9)  - DM2, hold Januvia & metformin DOS  - A1c on 3/3/25 was 8.6    HEME  VTE Medium Risk 1.8%             Total Score: 7    VTE: Greater than 59 yrs old    VTE: Male    VTE: Family Hx of VTE      - Hold ASA 81 mg " DOS      MSK  Patient is NOT Frail             Total Score: 1    Frailty: Increased exhaustion            The patient is aware that the final anesthesia plan will be decided by the assigned anesthesia provider on the date of service.      The patient is optimized for their procedure. AVS with information on surgery time/arrival time, meds and NPO status given by nursing staff. No further diagnostic testing indicated.      On the day of service:     Prep time: 12 minutes  Visit time: 19 minutes  Documentation time: 13 minutes  ------------------------------------------  Total time: 44 minutes      Michelle Lock PA-C  Preoperative Assessment Center  [facility]  Clinic and Surgery Center  Phone: [phone]  Fax: [phone]

## 2025-04-03 NOTE — PATIENT INSTRUCTIONS
Preparing for Your Surgery      Name:  Rohit William   MRN:  [id number]   :  1955   Today's Date:  4/3/2025     The [place] [employer] [address] Construction Project                                Timeline 2025 -2025    This project will affect travel to the [facility] and [facility], as well as the [facility].      Please check the [facility] [address] project website for the most up to date information and give yourself additional time to reach your destination.        Arriving for surgery:  Surgery date:  04/10/2025  Arrival time:  5:00 am    Please come to:         [facility]    [address] [place]  [address]     The [facility] [place] Patient/Visitor Ramp is at [address]. Patients and visitors who self-park will receive the reduced hospital parking rate. If the Patient /Visitor Ramp is full, please follow the signs to the [employer] car park located at the [facility] entrance.      [employer] parking is available (24 hours/ 7 days a week)      Discounted parking pass options are available for patients and visitors. They can be purchased at the [employer] desk at the [facility] entrance.     -    Stop at the security desk and they will direct surgery patients to the Surgery Check in and Family Lounge. [phone]        - If you need directions, a wheelchair or an escort please stop at the Information/security desk in the lobby.     What can I eat or drink?  -  You may eat and drink normally up to 8 hours prior to arrival time. (Until 25 at 10 pm)  -  You may have clear liquids until 2 hours prior to arrival time. (Until 3 am)    Examples of clear liquids:  Water  Clear broth  Juices (apple, white grape, white cranberry  and cider) without pulp  Noncarbonated, powder based beverages  (lemonade and Corey-Aid)  Sodas  (Magali, 7-Up, ginger ale and seltzer)  Coffee or tea (without milk or cream)  Gatorade    -  No Alcohol or cannabis products for at least 24 hours before surgery.     Which medicines can I take?    Hold Multivitamins for 7 days before surgery.  Hold Supplements for 7 days before surgery.  Hold Ibuprofen (Advil, Motrin) for 1 day(s) before surgery--unless otherwise directed by surgeon.  Hold Naproxen (Aleve) for 4 days before surgery.    Hold Losartan hydrochlorothiazide for 2 days before surgery---take last dose on 4/7/25    Take last dose of aspirin on 4/9/25    -  DO NOT take these medications the day of surgery:  Magnesium  Januvia (Sitagliptan)  Zinc  Metformin      -  PLEASE TAKE these medications the day of surgery:  Atorvastatin       How do I prepare myself?  - Please take 2 showers (one the night prior to surgery and one the morning of surgery) using Scrubcare or Hibiclens soap.    Use this soap only from the neck to your toes. Avoid genital area      Leave the soap on your skin for one minute--then rinse thoroughly.      You may use your own shampoo and conditioner. No other hair products.   - Please remove all jewelry and body piercings.  - No lotions, deodorants or fragrance.  - No makeup or fingernail polish.   - Bring your ID and insurance card.    -For patients being admitted to the [facility]  Family members are to take the patient belongings with them and place them in the lockers provided in the [facility].  Please limit the items you bring to 1 bag as the lockers are small.      -If you use a CPAP machine, please bring the CPAP machine, tubing, and mask to hospital.    -If you have a Deep Brain Stimulator, Spinal Cord Stimulator, or any Neuro Stimulator device---you must bring the remote control to the hospital.      ALL PATIENTS GOING HOME THE SAME DAY OF SURGERY ARE REQUIRED TO HAVE A RESPONSIBLE ADULT TO DRIVE AND BE IN ATTENDANCE WITH THEM FOR 24 HOURS FOLLOWING SURGERY.    Covid  testing policy as of 12/06/2022  Your surgeon will notify and schedule you for a COVID test if one is needed before surgery--please direct any questions or COVID symptoms to your surgeon      Questions or Concerns:    - For any questions regarding the day of surgery or your hospital stay, please contact the Pre Admission Nursing Office at [phone].       - If you have health changes between today and your surgery, please call your surgeon.   If you have questions about your medications, test results or have a change in your health status call Corinne Pickett RN at [phone] during regular business hours.       - For questions after surgery, please call your surgeons office.           Current Visitor Guidelines    2 adult visitors for adult patients in the pre op area    If additional visitors come (beyond a patient care attendant or a group home caregiver), the additional visitors will be asked to wait in the main lobby of the hospital    Visiting hours: 8 a.m. to 8:30 p.m.    Patients confirmed or suspected to have symptoms of COVID 19 or flu:     No visitors allowed for adult patients.   Children (under age 18) can have 1 named visitor.     People who are sick or showing symptoms of COVID 19 or flu:    Are not allowed to visit patients--we can only make exceptions in special situations.       Please follow these guidelines for your visit:          Please maintain social distance          Masking is optional--however at times you may be asked to wear a mask for the safety of yourself and others     Clean your hands with alcohol hand . Do this when you arrive at and leave the building and patient room,    And again after you touch your mask or anything in the room.     Go directly to and from the room you are visiting.     Stay in the patient s room during your visit. Limit going to other places in the hospital as much as possible     Leave bags and jackets at home or in the car.     For everyone s  health, please don t come and go during your visit. That includes for smoking   during your visit.

## 2025-04-07 RX ORDER — DEXMEDETOMIDINE HYDROCHLORIDE 4 UG/ML
.1-1.2 INJECTION, SOLUTION INTRAVENOUS CONTINUOUS
OUTPATIENT
Start: 2025-04-10

## 2025-04-07 RX ORDER — GABAPENTIN 100 MG/1
100 CAPSULE ORAL
OUTPATIENT
Start: 2025-04-07

## 2025-04-07 RX ORDER — PHENYLEPHRINE HCL IN 0.9% NACL 50MG/250ML
.1-6 PLASTIC BAG, INJECTION (ML) INTRAVENOUS CONTINUOUS
OUTPATIENT
Start: 2025-04-10

## 2025-04-07 RX ORDER — FAMOTIDINE 20 MG/1
20 TABLET, FILM COATED ORAL
OUTPATIENT
Start: 2025-04-07

## 2025-04-07 RX ORDER — CEFAZOLIN SODIUM/WATER 2 G/20 ML
2 SYRINGE (ML) INTRAVENOUS
OUTPATIENT
Start: 2025-04-07

## 2025-04-07 RX ORDER — CEFAZOLIN SODIUM/WATER 2 G/20 ML
2 SYRINGE (ML) INTRAVENOUS SEE ADMIN INSTRUCTIONS
OUTPATIENT
Start: 2025-04-07

## 2025-04-07 RX ORDER — LIDOCAINE 40 MG/G
CREAM TOPICAL
OUTPATIENT
Start: 2025-04-07

## 2025-04-07 RX ORDER — ASPIRIN 81 MG/1
81 TABLET, CHEWABLE ORAL
OUTPATIENT
Start: 2025-04-07

## 2025-04-07 RX ORDER — HEPARIN SOD,PORCINE/0.9 % NACL 30K/1000ML
INTRAVENOUS SOLUTION INTRAVENOUS
OUTPATIENT
Start: 2025-04-10

## 2025-04-07 RX ORDER — ACETAMINOPHEN 325 MG/1
975 TABLET ORAL ONCE
OUTPATIENT
Start: 2025-04-07 | End: 2025-04-07

## 2025-04-07 RX ORDER — CHLORHEXIDINE GLUCONATE ORAL RINSE 1.2 MG/ML
10 SOLUTION DENTAL ONCE
OUTPATIENT
Start: 2025-04-07 | End: 2025-04-07

## 2025-04-07 RX ORDER — ASPIRIN 81 MG/1
162 TABLET, CHEWABLE ORAL
OUTPATIENT
Start: 2025-04-07

## 2025-04-07 RX ORDER — EPINEPHRINE IN 0.9 % SOD CHLOR 5 MG/250ML
.01-.3 PLASTIC BAG, INJECTION (ML) INTRAVENOUS CONTINUOUS
OUTPATIENT
Start: 2025-04-10

## 2025-04-09 RX ORDER — NALOXONE HYDROCHLORIDE 0.4 MG/ML
0.2 INJECTION, SOLUTION INTRAMUSCULAR; INTRAVENOUS; SUBCUTANEOUS
Status: CANCELLED | OUTPATIENT
Start: 2025-04-09

## 2025-04-09 RX ORDER — FENTANYL CITRATE 50 UG/ML
25-50 INJECTION, SOLUTION INTRAMUSCULAR; INTRAVENOUS
Status: CANCELLED | OUTPATIENT
Start: 2025-04-09

## 2025-04-09 RX ORDER — NOREPINEPHRINE BITARTRATE 0.06 MG/ML
.01-.6 INJECTION, SOLUTION INTRAVENOUS CONTINUOUS
OUTPATIENT
Start: 2025-04-09

## 2025-04-09 RX ORDER — NALOXONE HYDROCHLORIDE 0.4 MG/ML
0.4 INJECTION, SOLUTION INTRAMUSCULAR; INTRAVENOUS; SUBCUTANEOUS
Status: CANCELLED | OUTPATIENT
Start: 2025-04-09

## 2025-04-09 RX ORDER — LIDOCAINE 40 MG/G
CREAM TOPICAL
OUTPATIENT
Start: 2025-04-09

## 2025-04-09 RX ORDER — NITROGLYCERIN 20 MG/100ML
10-200 INJECTION INTRAVENOUS CONTINUOUS
OUTPATIENT
Start: 2025-04-09

## 2025-04-09 RX ORDER — EPINEPHRINE IN 0.9 % SOD CHLOR 5 MG/250ML
0.03-0.3 PLASTIC BAG, INJECTION (ML) INTRAVENOUS CONTINUOUS
OUTPATIENT
Start: 2025-04-09

## 2025-04-09 RX ORDER — SODIUM CHLORIDE, SODIUM LACTATE, POTASSIUM CHLORIDE, CALCIUM CHLORIDE 600; 310; 30; 20 MG/100ML; MG/100ML; MG/100ML; MG/100ML
INJECTION, SOLUTION INTRAVENOUS CONTINUOUS
OUTPATIENT
Start: 2025-04-09

## 2025-04-09 RX ORDER — FLUMAZENIL 0.1 MG/ML
0.2 INJECTION, SOLUTION INTRAVENOUS
Status: CANCELLED | OUTPATIENT
Start: 2025-04-09

## 2025-04-09 RX ORDER — ACETAMINOPHEN 325 MG/1
975 TABLET ORAL ONCE
OUTPATIENT
Start: 2025-04-09 | End: 2025-04-09

## 2025-04-09 NOTE — ANESTHESIA PREPROCEDURE EVALUATION
Anesthesia Pre-Procedure Evaluation    Patient: Rohit William   MRN: [id number] : 1955        Procedure : Procedure(s):  CORONARY ARTERY BYPASS GRAFT AND ANY ASSOCIATED PROCEDURES          Past Medical History:   Diagnosis Date    CAD (coronary artery disease)     Diabetes (H)     Hypertension       Past Surgical History:   Procedure Laterality Date    COLONOSCOPY N/A 2022    Procedure: COLONOSCOPY, FLEXIBLE, WITH LESION REMOVAL USING SNARE;  Surgeon: Gomez Zurita MD;  Location: [facility]    COLONOSCOPY WITH CO2 INSUFFLATION N/A 2022    Procedure: COLONOSCOPY, WITH CO2 INSUFFLATION;  Surgeon: Gomez Zurita MD;  Location: [facility]    CV CORONARY ANGIOGRAM N/A 2025    Procedure: Coronary Angiogram;  Surgeon: Carlos Lepe MD;  [facility]    CV INSTANTANEOUS WAVE-FREE RATIO N/A 2025    Procedure: Instantaneous Wave-Free Ratio;  Surgeon: Carlos Lepe MD;  [facility]    ORTHOPEDIC SURGERY Left       Allergies   Allergen Reactions    Jardiance [Empagliflozin]      rash      Social History     Tobacco Use    Smoking status: Some Days     Types: Cigars    Smokeless tobacco: Never    Tobacco comments:     Rare cigar   Substance Use Topics    Alcohol use: Not Currently     Comment: 1-2 drinks month      Wt Readings from Last 1 Encounters:   25 100.5 kg (221 lb 8 oz)        Anesthesia Evaluation            ROS/MED HX  ENT/Pulmonary:       Neurologic:       Cardiovascular: Comment: Asymptomatic severe CAD, discovered with elective workup 2/2 significant FHx of coronary artery disease    Carotid ultrasound 3/3/2025:  IMPRESSION:  1. Arrythmia. Pulsus bisferiens waveforms consistent with aortic  stenosis.     2. RIGHT ICA: Less than 50% diameter stenosis by grayscale imaging and  sonographic velocity criteria.     3. LEFT ICA: Less than 50% diameter stenosis by grayscale imaging  and  sonographic velocity criteria.    carotid ultrasound 3/3/2025:  IMPRESSION:  1. Arrythmia. Pulsus bisferiens waveforms consistent with aortic  stenosis.     2. RIGHT ICA: Less than 50% diameter stenosis by grayscale imaging and  sonographic velocity criteria.     3. LEFT ICA: Less than 50% diameter stenosis by grayscale imaging and  sonographic velocity criteria.      (+)  hypertension- -  CAD -  - -                                    Echo: Date: 1/2/2025 Results:  Interpretation Summary     Global and regional left ventricular function is normal with an EF of 55-60%.  Mild concentric wall thickening consistent with left ventricular hypertrophy  is present.  Global right ventricular function is normal.  No significant valvular abnormalities were noted.  Dilated aortic root and ascending aorta measuring 4.4 cm (2.0 cm/m2) and 4.4  cm (2.0 cm/m2 respectively.) Recommend CTA or MRA chest to better assess  aortic dimensions.  Previous study not available for comparison.  ______________________________________________________________________________  Left Ventricle  Left ventricular size is normal. Global and regional left ventricular function  is normal with an EF of 55-60%. Biplane LVEF is 56%. Mild concentric wall  thickening consistent with left ventricular hypertrophy is present. Thickening  of the anterobasal septum is present. Left ventricular diastolic function is  indeterminate.     Right Ventricle  The right ventricle is normal size. Global right ventricular function is  normal.     Atria  The right atria appears normal. Mild left atrial enlargement is present.     Mitral Valve  The mitral valve is normal. Trace mitral insufficiency is present.     Aortic Valve  The aortic valve is tricuspid. Trace aortic insufficiency is present.     Tricuspid Valve  The tricuspid valve is normal. Trace tricuspid insufficiency is present. The  right ventricular systolic pressure is approximated at 25.0 mmHg plus  the  right atrial pressure.     Pulmonic Valve  The pulmonic valve is normal. Trace pulmonic insufficiency is present.     Vessels  The inferior vena cava was normal in size with preserved respiratory  variability. Aortic root and ascending dilatation is present. Sinuses of  Valsalva 4.4 cm. Ascending aorta 4.4 cm. IVC diameter <2.1 cm collapsing >50%  with sniff suggests a normal RA pressure of 3 mmHg.     Compared to Previous Study  Previous study not available for comparison.    Stress Test:  Date: Results:    ECG Reviewed:  Date: Results:    Cath:  Date: 2/13/2025 Results:  Left Main  The vessel is large.  Mid LM to Dist LM lesion is 20% stenosed.    Left Anterior Descending  The vessel is moderate in size.  Prox LAD to Mid LAD lesion is 40% stenosed. The lesion is irregular. The lesion is calcified. Pressure wire/iFR used. dPR was measured as 0.89  Mid LAD lesion is 70% stenosed. Measured dPr. Pre adenosine administration dPr: 0.89.    First Diagonal Branch  The vessel is large.  1st Diag lesion is 70% stenosed.    Second Diagonal Branch  The vessel is moderate in size.  2nd Diag-1 lesion is 70% stenosed.  2nd Diag-2 lesion is 70% stenosed.    Ramus Intermedius  The vessel is large.  Ramus lesion is 60% stenosed.    Left Circumflex  Prox Cx lesion is 75% stenosed.    First Obtuse Marginal Branch  The vessel is moderate in size and is angiographically normal.    Second Obtuse Marginal Branch  The vessel is small.  2nd Mrg lesion is 60% stenosed.    Right Coronary Artery  The vessel was visualized by selective angiography and is large. There was 0% mildly ectatic vessel disease.  Prox RCA to Mid RCA lesion is 30% stenosed. The lesion is irregular.    Right Ventricular Branch  The vessel is small.    Right Posterior Descending Artery  The vessel is moderate in size.  RPDA-1 lesion is 60% stenosed.  RPDA-2 lesion is 75% stenosed.    Right Posterior Atrioventricular Artery  The vessel is moderate in size.  RPAV  "lesion is 90% stenosed.    First Right Posterolateral Branch  The vessel is moderate in size and is angiographically normal.    Second Right Posterolateral Branch  The vessel is small and is angiographically normal.    Third Right Posterolateral Branch  The vessel is small and is angiographically normal.      METS/Exercise Tolerance:     Hematologic:       Musculoskeletal:       GI/Hepatic:       Renal/Genitourinary:       Endo:     (+)  type II DM,                    Psychiatric/Substance Use:       Infectious Disease:       Malignancy:       Other:               OUTSIDE LABS:  CBC:   Lab Results   Component Value Date    WBC 9.5 04/03/2025    WBC 10.0 03/03/2025    HGB 14.5 04/03/2025    HGB 14.9 03/03/2025    HCT 43.2 04/03/2025    HCT 44.1 03/03/2025     04/03/2025     03/03/2025     BMP:   Lab Results   Component Value Date     04/03/2025     03/03/2025    POTASSIUM 3.9 04/03/2025    POTASSIUM 4.0 03/03/2025    CHLORIDE 101 04/03/2025    CHLORIDE 101 03/03/2025    CO2 24 04/03/2025    CO2 24 03/03/2025    BUN 24.8 (H) 04/03/2025    BUN 20.5 03/03/2025    CR 0.87 04/03/2025    CR 0.81 03/03/2025     (H) 04/03/2025     (H) 03/03/2025     COAGS:   Lab Results   Component Value Date    PTT 27 04/03/2025    INR 0.94 04/03/2025     POC: No results found for: \"BGM\", \"HCG\", \"HCGS\"  HEPATIC:   Lab Results   Component Value Date    ALBUMIN 4.5 04/03/2025    PROTTOTAL 7.9 04/03/2025    ALT 18 04/03/2025    AST 16 04/03/2025    ALKPHOS 64 04/03/2025    BILITOTAL 0.5 04/03/2025     OTHER:   Lab Results   Component Value Date    A1C 8.6 (H) 03/03/2025    MAK 9.3 04/03/2025    PHOS 4.2 10/31/2024    MAG 1.9 04/03/2025       Anesthesia Plan    ASA Status:  4    NPO Status:  NPO Appropriate    Anesthesia Type: General.     - Airway: ETT   Induction: Intravenous.      Techniques and Equipment:     - Lines/Monitors: 2nd IV, Arterial Line, Central Line     Consents            Postoperative " "Care            Comments:    Other Comments: Preinduction arterial line           Chele Deluna MD    Clinically Significant Risk Factors Present on Admission                            # DMII: A1C = 8.6 % (Ref range: <5.7 %) within past 6 months    # Overweight: Estimated body mass index is 29.22 kg/m  as calculated from the following:    Height as of 4/3/25: 1.854 m (6' 1\").    Weight as of 4/3/25: 100.5 kg (221 lb 8 oz).                "

## 2025-04-10 ENCOUNTER — ANESTHESIA (OUTPATIENT)
Dept: SURGERY | Facility: CLINIC | Age: 70
End: 2025-04-10

## 2025-04-25 ENCOUNTER — ANESTHESIA EVENT (OUTPATIENT)
Dept: SURGERY | Facility: CLINIC | Age: 70
End: 2025-04-25
Payer: COMMERCIAL

## 2025-04-28 ASSESSMENT — LIFESTYLE VARIABLES: TOBACCO_USE: 1

## 2025-04-28 ASSESSMENT — ENCOUNTER SYMPTOMS
SEIZURES: 0
DYSRHYTHMIAS: 0

## 2025-04-28 NOTE — ANESTHESIA PREPROCEDURE EVALUATION
Anesthesia Pre-Procedure Evaluation    Patient: Rohit William   MRN: [id number] : 1955        Procedure : Procedure(s):  CORONARY ARTERY BYPASS GRAFT AND ANY ASSOCIATED PROCEDURES          Seen in the preoperative assessment clinic on 4/3/2025, please see this note for further details    69 yo with PMH significant for HTN, DM2, and obesity found to have severe multivessel coronary artery disease after undergoing an evaluation due to strong family history of early cardiac disease     Past Medical History:   Diagnosis Date    CAD (coronary artery disease)     Diabetes (H)     Hypertension       Past Surgical History:   Procedure Laterality Date    COLONOSCOPY N/A 2022    Procedure: COLONOSCOPY, FLEXIBLE, WITH LESION REMOVAL USING SNARE;  Surgeon: Gomez Zurita MD;  Location: [facility]    COLONOSCOPY WITH CO2 INSUFFLATION N/A 2022    Procedure: COLONOSCOPY, WITH CO2 INSUFFLATION;  Surgeon: Gomez Zurita MD;  Location: [facility]    CV CORONARY ANGIOGRAM N/A 2025    Procedure: Coronary Angiogram;  Surgeon: Carlos Lepe MD;  Location:  [facility]    CV INSTANTANEOUS WAVE-FREE RATIO N/A 2025    Procedure: Instantaneous Wave-Free Ratio;  Surgeon: Carlos Lepe MD;  [facility]    ORTHOPEDIC SURGERY Left       Allergies   Allergen Reactions    Jardiance [Empagliflozin]      rash      Social History     Tobacco Use    Smoking status: Some Days     Types: Cigars    Smokeless tobacco: Never    Tobacco comments:     Rare cigar   Substance Use Topics    Alcohol use: Not Currently     Comment: 1-2 drinks month      Wt Readings from Last 1 Encounters:   25 100.5 kg (221 lb 8 oz)        Anesthesia Evaluation   Pt has had prior anesthetic. Type: MAC and General.    No history of anesthetic complications       ROS/MED HX  ENT/Pulmonary:     (+)     LIA risk factors,  hypertension,         tobacco use  (occasional cigar),                     (-) asthma and recent URI   Neurologic:  - neg neurologic ROS  (-) no seizures and no CVA   Cardiovascular: Comment:   # multivessel CAD  # dilated aortic root/ascending aorta (4.4 cm)    3/3 Carotid US  IMPRESSION:  1. Arrythmia. Pulsus bisferiens waveforms consistent with aorticstenosis.  2. RIGHT ICA: Less than 50% diameter stenosis by grayscale imaging and sonographic velocity criteria.  3. LEFT ICA: Less than 50% diameter stenosis by grayscale imaging and sonographic velocity criteria.      (+)  hypertension- -  CAD -  - -   Taking blood thinners (ASA 81 mg)                              Previous cardiac testing   Echo: Date: 1/2/25 Results:  Global and regional left ventricular function is normal with an EF of 55-60%.  Mild concentric wall thickening consistent with left ventricular hypertrophy is present.  Global right ventricular function is normal.  No significant valvular abnormalities were noted.  Dilated aortic root and ascending aorta measuring 4.4 cm (2.0 cm/m2) and 4.4 cm (2.0 cm/m2 respectively.) Recommend CTA or MRA chest to better assess aortic dimensions.  Previous study not available for comparison.  Stress Test:  Date: Results:    ECG Reviewed:  Date: 3/3/25 Results:  Sinus rhythm with Premature atrial complexes   Minimal voltage criteria for LVH, may be normal variant ( R in aVL )   Inferior infarct (cited on or before 10-Dec-2024)   Abnormal ECG   When compared with ECG of 13-Feb-2025 11:34,   No significant change was found       Cath:  Date: 2/13/25 Results:  Conclusion    Severe three vessel CAD involving a severe stenosis of the mid LAD, D1 and D2, mid LCx, ramus intermedius, as well as rPL and rPDA.  dPR of the mid LAD hemodynamically significant at 0.89.   (-) angina, arrhythmias and angina   METS/Exercise Tolerance: >4 METS Comment: Walking, managing a horse farm   Hematologic:  - neg hematologic  ROS  (-) history of blood clots and history of  "blood transfusion   Musculoskeletal: Comment:   # OA s/p left knee surgery (2003)  (+)  arthritis (hands),             GI/Hepatic:  - neg GI/hepatic ROS  (-) GERD and liver disease   Renal/Genitourinary:  - neg Renal ROS  (-) renal disease   Endo:     (+)  type II DM, Last HgA1c: 8.6, date: 3/3/25, Not using insulin,  Normal glucose range: Avg 165 in the morning, not previously admitted for DM/DKA.       Obesity,    (-) thyroid disease   Psychiatric/Substance Use:  - neg psychiatric ROS     Infectious Disease:  - neg infectious disease ROS     Malignancy:  - neg malignancy ROS     Other:  - neg other ROS          Physical Exam    Airway        Mallampati: II   TM distance: < 3 FB   Neck ROM: full   Mouth opening: > 3 cm    Respiratory Devices and Support         Dental       (+) Modest Abnormalities - crowns, retainers, 1 or 2 missing teeth      Cardiovascular   cardiovascular exam normal       Rhythm and rate: regular and normal     Pulmonary   pulmonary exam normal        breath sounds clear to auscultation           OUTSIDE LABS:  CBC:   Lab Results   Component Value Date    WBC 9.5 04/03/2025    WBC 10.0 03/03/2025    HGB 14.5 04/03/2025    HGB 14.9 03/03/2025    HCT 43.2 04/03/2025    HCT 44.1 03/03/2025     04/03/2025     03/03/2025     BMP:   Lab Results   Component Value Date     04/03/2025     03/03/2025    POTASSIUM 3.9 04/03/2025    POTASSIUM 4.0 03/03/2025    CHLORIDE 101 04/03/2025    CHLORIDE 101 03/03/2025    CO2 24 04/03/2025    CO2 24 03/03/2025    BUN 24.8 (H) 04/03/2025    BUN 20.5 03/03/2025    CR 0.87 04/03/2025    CR 0.81 03/03/2025     (H) 04/03/2025     (H) 03/03/2025     COAGS:   Lab Results   Component Value Date    PTT 27 04/03/2025    INR 0.94 04/03/2025     POC: No results found for: \"BGM\", \"HCG\", \"HCGS\"  HEPATIC:   Lab Results   Component Value Date    ALBUMIN 4.5 04/03/2025    PROTTOTAL 7.9 04/03/2025    ALT 18 04/03/2025    AST 16 04/03/2025    " "ALKPHOS 64 04/03/2025    BILITOTAL 0.5 04/03/2025     OTHER:   Lab Results   Component Value Date    A1C 8.6 (H) 03/03/2025    MAK 9.3 04/03/2025    PHOS 4.2 10/31/2024    MAG 1.9 04/03/2025       Anesthesia Plan    ASA Status:  4    NPO Status:  NPO Appropriate    Anesthesia Type: General.     - Airway: ETT   Induction: Intravenous, Propofol.   Maintenance: Balanced.   Techniques and Equipment:     - Airway: Video-Laryngoscope     - Lines/Monitors: Arterial Line, Central Line, CVP, BIS, TRAVON, NIRS            TRAVON Absolute Contra-indication: NONE            TRAVON Relative Contra-indication: NONE     - Blood: T&S, Cell Saver, Blood in Room     Consents    Anesthesia Plan(s) and associated risks, benefits, and realistic alternatives discussed. Questions answered and patient/representative(s) expressed understanding.     - Discussed:     - Discussed with:  Patient, Spouse      - Extended Intubation/Ventilatory Support Discussed: Yes.      - Patient is DNR/DNI Status: No     Use of blood products discussed: Yes.     - Discussed with: Patient.     - Consented: consented to blood products     Postoperative Care    Pain management: Multi-modal analgesia, Oral pain medications, IV analgesics.   PONV prophylaxis: Ondansetron (or other 5HT-3), Dexamethasone or Solumedrol     Comments:               Kunal Reynolds MD    Clinically Significant Risk Factors Present on Admission                            # DMII: A1C = 8.6 % (Ref range: <5.7 %) within past 6 months    # Overweight: Estimated body mass index is 29.22 kg/m  as calculated from the following:    Height as of 4/3/25: 1.854 m (6' 1\").    Weight as of 4/3/25: 100.5 kg (221 lb 8 oz).                "

## 2025-04-29 ENCOUNTER — ANESTHESIA (OUTPATIENT)
Dept: SURGERY | Facility: CLINIC | Age: 70
End: 2025-04-29
Payer: COMMERCIAL

## 2025-04-29 ENCOUNTER — HOSPITAL ENCOUNTER (INPATIENT)
Facility: CLINIC | Age: 70
DRG: 235 | End: 2025-04-29
Attending: THORACIC SURGERY (CARDIOTHORACIC VASCULAR SURGERY) | Admitting: THORACIC SURGERY (CARDIOTHORACIC VASCULAR SURGERY)
Payer: COMMERCIAL

## 2025-04-29 ENCOUNTER — APPOINTMENT (OUTPATIENT)
Dept: GENERAL RADIOLOGY | Facility: CLINIC | Age: 70
DRG: 235 | End: 2025-04-29
Attending: SURGERY
Payer: COMMERCIAL

## 2025-04-29 ENCOUNTER — APPOINTMENT (OUTPATIENT)
Dept: GENERAL RADIOLOGY | Facility: CLINIC | Age: 70
DRG: 235 | End: 2025-04-29
Payer: COMMERCIAL

## 2025-04-29 DIAGNOSIS — I25.10 CAD, MULTIPLE VESSEL: ICD-10-CM

## 2025-04-29 DIAGNOSIS — I25.10 CORONARY ARTERY DISEASE: Primary | ICD-10-CM

## 2025-04-29 DIAGNOSIS — I63.413 CEREBROVASCULAR ACCIDENT (CVA) DUE TO BILATERAL EMBOLISM OF MIDDLE CEREBRAL ARTERIES (H): ICD-10-CM

## 2025-04-29 DIAGNOSIS — Z95.1 S/P CABG (CORONARY ARTERY BYPASS GRAFT): ICD-10-CM

## 2025-04-29 LAB
ALBUMIN SERPL BCG-MCNC: 3.5 G/DL (ref 3.5–5.2)
ALLEN'S TEST: ABNORMAL
ALLEN'S TEST: ABNORMAL
ALP SERPL-CCNC: 43 U/L (ref 40–150)
ALT SERPL W P-5'-P-CCNC: 14 U/L (ref 0–70)
ANION GAP SERPL CALCULATED.3IONS-SCNC: 10 MMOL/L (ref 7–15)
APTT PPP: 29 SECONDS (ref 22–38)
APTT PPP: 35 SECONDS (ref 22–38)
AST SERPL W P-5'-P-CCNC: 43 U/L (ref 0–45)
BASE EXCESS BLDA CALC-SCNC: -0.2 MMOL/L (ref -3–3)
BASE EXCESS BLDA CALC-SCNC: -0.4 MMOL/L (ref -3–3)
BASE EXCESS BLDA CALC-SCNC: -0.4 MMOL/L (ref -3–3)
BASE EXCESS BLDA CALC-SCNC: -1.1 MMOL/L (ref -3–3)
BASE EXCESS BLDA CALC-SCNC: -2.3 MMOL/L (ref -3–3)
BASE EXCESS BLDA CALC-SCNC: -2.3 MMOL/L (ref -3–3)
BASE EXCESS BLDA CALC-SCNC: -3.1 MMOL/L (ref -3–3)
BASE EXCESS BLDA CALC-SCNC: -3.3 MMOL/L (ref -3–3)
BASE EXCESS BLDA CALC-SCNC: -3.8 MMOL/L (ref -3–3)
BASE EXCESS BLDA CALC-SCNC: -4.5 MMOL/L (ref -3–3)
BASE EXCESS BLDV CALC-SCNC: 1.6 MMOL/L (ref -3–3)
BILIRUB SERPL-MCNC: 0.6 MG/DL
BLD PROD TYP BPU: NORMAL
BLOOD COMPONENT TYPE: NORMAL
BUN SERPL-MCNC: 17.5 MG/DL (ref 8–23)
CA-I BLD-MCNC: 3.9 MG/DL (ref 4.4–5.2)
CA-I BLD-MCNC: 4 MG/DL (ref 4.4–5.2)
CA-I BLD-MCNC: 4.1 MG/DL (ref 4.4–5.2)
CA-I BLD-MCNC: 4.4 MG/DL (ref 4.4–5.2)
CA-I BLD-MCNC: 4.5 MG/DL (ref 4.4–5.2)
CA-I BLD-MCNC: 4.6 MG/DL (ref 4.4–5.2)
CA-I BLD-MCNC: 4.8 MG/DL (ref 4.4–5.2)
CA-I BLD-MCNC: 5.3 MG/DL (ref 4.4–5.2)
CALCIUM SERPL-MCNC: 9.5 MG/DL (ref 8.8–10.4)
CHLORIDE SERPL-SCNC: 107 MMOL/L (ref 98–107)
CLOT INIT KAOL IND TO POST HEP NEUT TRTO: 1 {RATIO}
CLOT INIT KAOL IND TO POST HEP NEUT TRTO: 1 {RATIO}
CLOT INIT KAOL IND TO POST HEP NEUT TRTO: 3.1 {RATIO}
CLOT INIT KAOLIN IND BLD US: 119 SEC (ref 113–166)
CLOT INIT KAOLIN IND BLD US: 134 SEC (ref 113–166)
CLOT INIT KAOLIN IND BLD US: 450 SEC (ref 113–166)
CLOT INIT KAOLIN IND P HEP NEUT BLD US: 116 SEC (ref 103–153)
CLOT INIT KAOLIN IND P HEP NEUT BLD US: 139 SEC (ref 103–153)
CLOT INIT KAOLIN IND P HEP NEUT BLD US: 143 SEC (ref 103–153)
CLOT STIFF PLT CONT BLD CALC: 19.7 HPA (ref 11.9–29.8)
CLOT STIFF PLT CONT BLD CALC: 20.3 HPA (ref 11.9–29.8)
CLOT STIFF PLT CONT BLD CALC: 20.7 HPA (ref 11.9–29.8)
CLOT STIFF TF IND P HEP NEUT BLD US: 21.8 HPA (ref 13–33.2)
CLOT STIFF TF IND P HEP NEUT BLD US: 22.5 HPA (ref 13–33.2)
CLOT STIFF TF IND P HEP NEUT BLD US: 22.6 HPA (ref 13–33.2)
CLOT STIFF TF IND+IIB-IIIA INH P HEP NEU: 1.9 HPA (ref 1–3.7)
CLOT STIFF TF IND+IIB-IIIA INH P HEP NEU: 2.1 HPA (ref 1–3.7)
CLOT STIFF TF IND+IIB-IIIA INH P HEP NEU: 2.2 HPA (ref 1–3.7)
CODING SYSTEM: NORMAL
CREAT SERPL-MCNC: 0.8 MG/DL (ref 0.67–1.17)
EGFRCR SERPLBLD CKD-EPI 2021: >90 ML/MIN/1.73M2
ERYTHROCYTE [DISTWIDTH] IN BLOOD BY AUTOMATED COUNT: 14.4 % (ref 10–15)
ERYTHROCYTE [DISTWIDTH] IN BLOOD BY AUTOMATED COUNT: 14.5 % (ref 10–15)
ERYTHROCYTE [DISTWIDTH] IN BLOOD BY AUTOMATED COUNT: 14.5 % (ref 10–15)
FIBRINOGEN PPP-MCNC: 221 MG/DL (ref 170–510)
GLUCOSE BLD-MCNC: 179 MG/DL (ref 70–99)
GLUCOSE BLD-MCNC: 185 MG/DL (ref 70–99)
GLUCOSE BLD-MCNC: 190 MG/DL (ref 70–99)
GLUCOSE BLD-MCNC: 191 MG/DL (ref 70–99)
GLUCOSE BLD-MCNC: 195 MG/DL (ref 70–99)
GLUCOSE BLD-MCNC: 212 MG/DL (ref 70–99)
GLUCOSE BLDC GLUCOMTR-MCNC: 128 MG/DL (ref 70–99)
GLUCOSE BLDC GLUCOMTR-MCNC: 129 MG/DL (ref 70–99)
GLUCOSE BLDC GLUCOMTR-MCNC: 147 MG/DL (ref 70–99)
GLUCOSE BLDC GLUCOMTR-MCNC: 154 MG/DL (ref 70–99)
GLUCOSE BLDC GLUCOMTR-MCNC: 165 MG/DL (ref 70–99)
GLUCOSE BLDC GLUCOMTR-MCNC: 174 MG/DL (ref 70–99)
GLUCOSE BLDC GLUCOMTR-MCNC: 189 MG/DL (ref 70–99)
GLUCOSE BLDC GLUCOMTR-MCNC: 193 MG/DL (ref 70–99)
GLUCOSE BLDC GLUCOMTR-MCNC: 198 MG/DL (ref 70–99)
GLUCOSE BLDC GLUCOMTR-MCNC: 199 MG/DL (ref 70–99)
GLUCOSE BLDC GLUCOMTR-MCNC: 207 MG/DL (ref 70–99)
GLUCOSE SERPL-MCNC: 191 MG/DL (ref 70–99)
HCO3 BLD-SCNC: 21 MMOL/L (ref 21–28)
HCO3 BLD-SCNC: 22 MMOL/L (ref 21–28)
HCO3 BLDA-SCNC: 21 MMOL/L (ref 21–28)
HCO3 BLDA-SCNC: 22 MMOL/L (ref 21–28)
HCO3 BLDA-SCNC: 23 MMOL/L (ref 21–28)
HCO3 BLDA-SCNC: 23 MMOL/L (ref 21–28)
HCO3 BLDA-SCNC: 24 MMOL/L (ref 21–28)
HCO3 BLDA-SCNC: 25 MMOL/L (ref 21–28)
HCO3 BLDV-SCNC: 28 MMOL/L (ref 21–28)
HCO3 SERPL-SCNC: 21 MMOL/L (ref 22–29)
HCT VFR BLD AUTO: 26.5 % (ref 40–53)
HCT VFR BLD AUTO: 29.1 % (ref 40–53)
HCT VFR BLD AUTO: 32.6 % (ref 40–53)
HGB BLD-MCNC: 10 G/DL (ref 13.3–17.7)
HGB BLD-MCNC: 11.2 G/DL (ref 13.3–17.7)
HGB BLD-MCNC: 11.2 G/DL (ref 13.3–17.7)
HGB BLD-MCNC: 11.9 G/DL (ref 13.3–17.7)
HGB BLD-MCNC: 12.8 G/DL (ref 13.3–17.7)
HGB BLD-MCNC: 9 G/DL (ref 13.3–17.7)
HGB BLD-MCNC: 9.1 G/DL (ref 13.3–17.7)
HGB BLD-MCNC: 9.4 G/DL (ref 13.3–17.7)
HGB BLD-MCNC: 9.4 G/DL (ref 13.3–17.7)
HGB BLD-MCNC: 9.5 G/DL (ref 13.3–17.7)
HGB BLD-MCNC: 9.5 G/DL (ref 13.3–17.7)
HGB BLD-MCNC: 9.7 G/DL (ref 13.3–17.7)
INR PPP: 1.3 (ref 0.85–1.15)
INR PPP: 1.43 (ref 0.85–1.15)
ISSUE DATE AND TIME: NORMAL
LACTATE BLD-SCNC: 1.1 MMOL/L (ref 0.7–2)
LACTATE BLD-SCNC: 1.1 MMOL/L (ref 0.7–2)
LACTATE BLD-SCNC: 1.2 MMOL/L (ref 0.7–2)
LACTATE BLD-SCNC: 1.2 MMOL/L (ref 0.7–2)
LACTATE BLD-SCNC: 1.5 MMOL/L (ref 0.7–2)
LACTATE BLD-SCNC: 1.7 MMOL/L (ref 0.7–2)
LACTATE BLD-SCNC: 1.9 MMOL/L (ref 0.7–2)
LACTATE BLD-SCNC: 2.6 MMOL/L (ref 0.7–2)
LACTATE BLD-SCNC: 2.8 MMOL/L (ref 0.7–2)
LACTATE SERPL-SCNC: 2.5 MMOL/L (ref 0.7–2)
LACTATE SERPL-SCNC: 3.5 MMOL/L (ref 0.7–2)
MAGNESIUM SERPL-MCNC: 2.4 MG/DL (ref 1.7–2.3)
MCH RBC QN AUTO: 29.4 PG (ref 26.5–33)
MCH RBC QN AUTO: 29.9 PG (ref 26.5–33)
MCH RBC QN AUTO: 30 PG (ref 26.5–33)
MCHC RBC AUTO-ENTMCNC: 34.3 G/DL (ref 31.5–36.5)
MCHC RBC AUTO-ENTMCNC: 34.4 G/DL (ref 31.5–36.5)
MCHC RBC AUTO-ENTMCNC: 34.4 G/DL (ref 31.5–36.5)
MCV RBC AUTO: 86 FL (ref 78–100)
MCV RBC AUTO: 87 FL (ref 78–100)
MCV RBC AUTO: 87 FL (ref 78–100)
O2/TOTAL GAS SETTING VFR VENT: 100 %
O2/TOTAL GAS SETTING VFR VENT: 40 %
O2/TOTAL GAS SETTING VFR VENT: 40 %
O2/TOTAL GAS SETTING VFR VENT: 60 %
O2/TOTAL GAS SETTING VFR VENT: 60 %
O2/TOTAL GAS SETTING VFR VENT: 80 %
OXYHGB MFR BLDA: 96 % (ref 92–100)
OXYHGB MFR BLDA: 98 % (ref 92–100)
OXYHGB MFR BLDA: 99 % (ref 92–100)
OXYHGB MFR BLDV: 79 % (ref 70–75)
PCO2 BLD: 38 MM HG (ref 35–45)
PCO2 BLD: 42 MM HG (ref 35–45)
PCO2 BLDA: 39 MM HG (ref 35–45)
PCO2 BLDA: 41 MM HG (ref 35–45)
PCO2 BLDA: 42 MM HG (ref 35–45)
PCO2 BLDA: 42 MM HG (ref 35–45)
PCO2 BLDA: 43 MM HG (ref 35–45)
PCO2 BLDA: 44 MM HG (ref 35–45)
PCO2 BLDV: 49 MM HG (ref 40–50)
PEEP: 5 CM H2O
PH BLD: 7.32 [PH] (ref 7.35–7.45)
PH BLD: 7.37 [PH] (ref 7.35–7.45)
PH BLDA: 7.35 [PH] (ref 7.35–7.45)
PH BLDA: 7.36 [PH] (ref 7.35–7.45)
PH BLDA: 7.36 [PH] (ref 7.35–7.45)
PH BLDA: 7.37 [PH] (ref 7.35–7.45)
PH BLDA: 7.37 [PH] (ref 7.35–7.45)
PH BLDA: 7.38 [PH] (ref 7.35–7.45)
PH BLDV: 7.36 [PH] (ref 7.32–7.43)
PHOSPHATE SERPL-MCNC: 3.1 MG/DL (ref 2.5–4.5)
PLATELET # BLD AUTO: 193 10E3/UL (ref 150–450)
PLATELET # BLD AUTO: 222 10E3/UL (ref 150–450)
PLATELET # BLD AUTO: 245 10E3/UL (ref 150–450)
PO2 BLD: 141 MM HG (ref 80–105)
PO2 BLD: 99 MM HG (ref 80–105)
PO2 BLDA: 143 MM HG (ref 80–105)
PO2 BLDA: 185 MM HG (ref 80–105)
PO2 BLDA: 231 MM HG (ref 80–105)
PO2 BLDA: 293 MM HG (ref 80–105)
PO2 BLDA: 359 MM HG (ref 80–105)
PO2 BLDA: 391 MM HG (ref 80–105)
PO2 BLDA: 408 MM HG (ref 80–105)
PO2 BLDA: 410 MM HG (ref 80–105)
PO2 BLDV: 46 MM HG (ref 25–47)
POTASSIUM BLD-SCNC: 3.6 MMOL/L (ref 3.4–5.3)
POTASSIUM BLD-SCNC: 3.7 MMOL/L (ref 3.4–5.3)
POTASSIUM BLD-SCNC: 3.9 MMOL/L (ref 3.4–5.3)
POTASSIUM BLD-SCNC: 4 MMOL/L (ref 3.4–5.3)
POTASSIUM BLD-SCNC: 4 MMOL/L (ref 3.4–5.3)
POTASSIUM BLD-SCNC: 4.7 MMOL/L (ref 3.4–5.3)
POTASSIUM BLD-SCNC: 4.9 MMOL/L (ref 3.4–5.3)
POTASSIUM SERPL-SCNC: 3.7 MMOL/L (ref 3.4–5.3)
PROT SERPL-MCNC: 5.4 G/DL (ref 6.4–8.3)
PROTHROMBIN TIME: 16.5 SECONDS (ref 11.8–14.8)
PROTHROMBIN TIME: 17.7 SECONDS (ref 11.8–14.8)
RBC # BLD AUTO: 3.04 10E6/UL (ref 4.4–5.9)
RBC # BLD AUTO: 3.4 10E6/UL (ref 4.4–5.9)
RBC # BLD AUTO: 3.73 10E6/UL (ref 4.4–5.9)
SAO2 % BLDA: 100 % (ref 95–96)
SAO2 % BLDA: 97.8 % (ref 95–96)
SAO2 % BLDA: 99 % (ref 95–96)
SAO2 % BLDA: 99.8 % (ref 95–96)
SAO2 % BLDV: 80 % (ref 70–75)
SODIUM BLD-SCNC: 138 MMOL/L (ref 135–145)
SODIUM BLD-SCNC: 139 MMOL/L (ref 135–145)
SODIUM BLD-SCNC: 140 MMOL/L (ref 135–145)
SODIUM BLD-SCNC: 140 MMOL/L (ref 135–145)
SODIUM SERPL-SCNC: 138 MMOL/L (ref 135–145)
UNIT ABO/RH: NORMAL
UNIT NUMBER: NORMAL
UNIT STATUS: NORMAL
UNIT TYPE ISBT: 600
WBC # BLD AUTO: 22 10E3/UL (ref 4–11)
WBC # BLD AUTO: 22.7 10E3/UL (ref 4–11)
WBC # BLD AUTO: 25.8 10E3/UL (ref 4–11)

## 2025-04-29 PROCEDURE — 250N000009 HC RX 250: Performed by: STUDENT IN AN ORGANIZED HEALTH CARE EDUCATION/TRAINING PROGRAM

## 2025-04-29 PROCEDURE — 999N000157 HC STATISTIC RCP TIME EA 10 MIN

## 2025-04-29 PROCEDURE — 250N000024 HC ISOFLURANE, PER MIN: Performed by: THORACIC SURGERY (CARDIOTHORACIC VASCULAR SURGERY)

## 2025-04-29 PROCEDURE — 250N000012 HC RX MED GY IP 250 OP 636 PS 637: Performed by: THORACIC SURGERY (CARDIOTHORACIC VASCULAR SURGERY)

## 2025-04-29 PROCEDURE — 250N000013 HC RX MED GY IP 250 OP 250 PS 637: Performed by: STUDENT IN AN ORGANIZED HEALTH CARE EDUCATION/TRAINING PROGRAM

## 2025-04-29 PROCEDURE — 360N000079 HC SURGERY LEVEL 6, PER MIN: Performed by: THORACIC SURGERY (CARDIOTHORACIC VASCULAR SURGERY)

## 2025-04-29 PROCEDURE — 272N000085 HC PACK CELL SAVER CSP: Performed by: THORACIC SURGERY (CARDIOTHORACIC VASCULAR SURGERY)

## 2025-04-29 PROCEDURE — 258N000003 HC RX IP 258 OP 636

## 2025-04-29 PROCEDURE — 258N000003 HC RX IP 258 OP 636: Performed by: THORACIC SURGERY (CARDIOTHORACIC VASCULAR SURGERY)

## 2025-04-29 PROCEDURE — C1898 LEAD, PMKR, OTHER THAN TRANS: HCPCS | Performed by: THORACIC SURGERY (CARDIOTHORACIC VASCULAR SURGERY)

## 2025-04-29 PROCEDURE — 83605 ASSAY OF LACTIC ACID: CPT

## 2025-04-29 PROCEDURE — C1760 CLOSURE DEV, VASC: HCPCS | Performed by: THORACIC SURGERY (CARDIOTHORACIC VASCULAR SURGERY)

## 2025-04-29 PROCEDURE — 85730 THROMBOPLASTIN TIME PARTIAL: CPT | Performed by: SURGERY

## 2025-04-29 PROCEDURE — 272N000088 HC PUMP APP ADULT PERFUSION: Performed by: THORACIC SURGERY (CARDIOTHORACIC VASCULAR SURGERY)

## 2025-04-29 PROCEDURE — 83605 ASSAY OF LACTIC ACID: CPT | Performed by: SURGERY

## 2025-04-29 PROCEDURE — 250N000009 HC RX 250

## 2025-04-29 PROCEDURE — 33512 CABG VEIN THREE: CPT | Mod: GC | Performed by: THORACIC SURGERY (CARDIOTHORACIC VASCULAR SURGERY)

## 2025-04-29 PROCEDURE — 250N000011 HC RX IP 250 OP 636

## 2025-04-29 PROCEDURE — 250N000011 HC RX IP 250 OP 636: Performed by: PHYSICIAN ASSISTANT

## 2025-04-29 PROCEDURE — P9047 ALBUMIN (HUMAN), 25%, 50ML: HCPCS

## 2025-04-29 PROCEDURE — 250N000011 HC RX IP 250 OP 636: Performed by: STUDENT IN AN ORGANIZED HEALTH CARE EDUCATION/TRAINING PROGRAM

## 2025-04-29 PROCEDURE — 250N000009 HC RX 250: Performed by: ANESTHESIOLOGY

## 2025-04-29 PROCEDURE — 200N000002 HC R&B ICU UMMC

## 2025-04-29 PROCEDURE — 82330 ASSAY OF CALCIUM: CPT | Performed by: SURGERY

## 2025-04-29 PROCEDURE — 250N000011 HC RX IP 250 OP 636: Performed by: THORACIC SURGERY (CARDIOTHORACIC VASCULAR SURGERY)

## 2025-04-29 PROCEDURE — 85610 PROTHROMBIN TIME: CPT | Performed by: STUDENT IN AN ORGANIZED HEALTH CARE EDUCATION/TRAINING PROGRAM

## 2025-04-29 PROCEDURE — 74018 RADEX ABDOMEN 1 VIEW: CPT | Mod: 26 | Performed by: RADIOLOGY

## 2025-04-29 PROCEDURE — 250N000011 HC RX IP 250 OP 636: Performed by: SURGERY

## 2025-04-29 PROCEDURE — 410N000004: Performed by: THORACIC SURGERY (CARDIOTHORACIC VASCULAR SURGERY)

## 2025-04-29 PROCEDURE — 84100 ASSAY OF PHOSPHORUS: CPT | Performed by: SURGERY

## 2025-04-29 PROCEDURE — 83735 ASSAY OF MAGNESIUM: CPT | Performed by: SURGERY

## 2025-04-29 PROCEDURE — 258N000003 HC RX IP 258 OP 636: Performed by: STUDENT IN AN ORGANIZED HEALTH CARE EDUCATION/TRAINING PROGRAM

## 2025-04-29 PROCEDURE — 85396 CLOTTING ASSAY WHOLE BLOOD: CPT

## 2025-04-29 PROCEDURE — 85730 THROMBOPLASTIN TIME PARTIAL: CPT | Performed by: STUDENT IN AN ORGANIZED HEALTH CARE EDUCATION/TRAINING PROGRAM

## 2025-04-29 PROCEDURE — 71045 X-RAY EXAM CHEST 1 VIEW: CPT | Mod: 26 | Performed by: RADIOLOGY

## 2025-04-29 PROCEDURE — 84295 ASSAY OF SERUM SODIUM: CPT | Performed by: SURGERY

## 2025-04-29 PROCEDURE — 85384 FIBRINOGEN ACTIVITY: CPT | Performed by: STUDENT IN AN ORGANIZED HEALTH CARE EDUCATION/TRAINING PROGRAM

## 2025-04-29 PROCEDURE — 06BQ4ZZ EXCISION OF LEFT SAPHENOUS VEIN, PERCUTANEOUS ENDOSCOPIC APPROACH: ICD-10-PCS | Performed by: THORACIC SURGERY (CARDIOTHORACIC VASCULAR SURGERY)

## 2025-04-29 PROCEDURE — 999N000065 XR CHEST PORT 1 VIEW

## 2025-04-29 PROCEDURE — 36415 COLL VENOUS BLD VENIPUNCTURE: CPT | Performed by: STUDENT IN AN ORGANIZED HEALTH CARE EDUCATION/TRAINING PROGRAM

## 2025-04-29 PROCEDURE — 021209W BYPASS CORONARY ARTERY, THREE ARTERIES FROM AORTA WITH AUTOLOGOUS VENOUS TISSUE, OPEN APPROACH: ICD-10-PCS | Performed by: THORACIC SURGERY (CARDIOTHORACIC VASCULAR SURGERY)

## 2025-04-29 PROCEDURE — 82805 BLOOD GASES W/O2 SATURATION: CPT

## 2025-04-29 PROCEDURE — 74018 RADEX ABDOMEN 1 VIEW: CPT

## 2025-04-29 PROCEDURE — 85610 PROTHROMBIN TIME: CPT | Performed by: SURGERY

## 2025-04-29 PROCEDURE — P9037 PLATE PHERES LEUKOREDU IRRAD: HCPCS

## 2025-04-29 PROCEDURE — 85027 COMPLETE CBC AUTOMATED: CPT

## 2025-04-29 PROCEDURE — 999N000141 HC STATISTIC PRE-PROCEDURE NURSING ASSESSMENT: Performed by: THORACIC SURGERY (CARDIOTHORACIC VASCULAR SURGERY)

## 2025-04-29 PROCEDURE — 272N000001 HC OR GENERAL SUPPLY STERILE: Performed by: THORACIC SURGERY (CARDIOTHORACIC VASCULAR SURGERY)

## 2025-04-29 PROCEDURE — 82805 BLOOD GASES W/O2 SATURATION: CPT | Performed by: SURGERY

## 2025-04-29 PROCEDURE — 85018 HEMOGLOBIN: CPT | Performed by: STUDENT IN AN ORGANIZED HEALTH CARE EDUCATION/TRAINING PROGRAM

## 2025-04-29 PROCEDURE — 250N000009 HC RX 250: Performed by: NURSE ANESTHETIST, CERTIFIED REGISTERED

## 2025-04-29 PROCEDURE — 94002 VENT MGMT INPAT INIT DAY: CPT

## 2025-04-29 PROCEDURE — 99223 1ST HOSP IP/OBS HIGH 75: CPT | Mod: 24 | Performed by: INTERNAL MEDICINE

## 2025-04-29 PROCEDURE — 370N000017 HC ANESTHESIA TECHNICAL FEE, PER MIN: Performed by: THORACIC SURGERY (CARDIOTHORACIC VASCULAR SURGERY)

## 2025-04-29 PROCEDURE — 250N000009 HC RX 250: Performed by: THORACIC SURGERY (CARDIOTHORACIC VASCULAR SURGERY)

## 2025-04-29 PROCEDURE — 999N000259 HC STATISTIC EXTUBATION

## 2025-04-29 PROCEDURE — 258N000003 HC RX IP 258 OP 636: Performed by: PHYSICIAN ASSISTANT

## 2025-04-29 PROCEDURE — 93005 ELECTROCARDIOGRAM TRACING: CPT

## 2025-04-29 PROCEDURE — 85018 HEMOGLOBIN: CPT | Performed by: SURGERY

## 2025-04-29 PROCEDURE — 410N000003 HC PER-PERFUSION 1ST 30 MIN: Performed by: THORACIC SURGERY (CARDIOTHORACIC VASCULAR SURGERY)

## 2025-04-29 PROCEDURE — 250N000013 HC RX MED GY IP 250 OP 250 PS 637: Performed by: SURGERY

## 2025-04-29 PROCEDURE — 82330 ASSAY OF CALCIUM: CPT

## 2025-04-29 PROCEDURE — 5A1221Z PERFORMANCE OF CARDIAC OUTPUT, CONTINUOUS: ICD-10-PCS | Performed by: THORACIC SURGERY (CARDIOTHORACIC VASCULAR SURGERY)

## 2025-04-29 RX ORDER — NITROGLYCERIN 10 MG/100ML
INJECTION INTRAVENOUS PRN
Status: DISCONTINUED | OUTPATIENT
Start: 2025-04-29 | End: 2025-04-29

## 2025-04-29 RX ORDER — HYDRALAZINE HYDROCHLORIDE 20 MG/ML
10 INJECTION INTRAMUSCULAR; INTRAVENOUS EVERY 30 MIN PRN
Status: DISCONTINUED | OUTPATIENT
Start: 2025-04-29 | End: 2025-05-07 | Stop reason: HOSPADM

## 2025-04-29 RX ORDER — CEFAZOLIN SODIUM 2 G/50ML
2 SOLUTION INTRAVENOUS EVERY 8 HOURS
Status: COMPLETED | OUTPATIENT
Start: 2025-04-29 | End: 2025-04-30

## 2025-04-29 RX ORDER — ACETAMINOPHEN 325 MG/1
975 TABLET ORAL ONCE
Status: COMPLETED | OUTPATIENT
Start: 2025-04-29 | End: 2025-04-29

## 2025-04-29 RX ORDER — PROPOFOL 10 MG/ML
INJECTION, EMULSION INTRAVENOUS PRN
Status: DISCONTINUED | OUTPATIENT
Start: 2025-04-29 | End: 2025-04-29

## 2025-04-29 RX ORDER — LIDOCAINE HYDROCHLORIDE 20 MG/ML
INJECTION, SOLUTION INFILTRATION; PERINEURAL PRN
Status: DISCONTINUED | OUTPATIENT
Start: 2025-04-29 | End: 2025-04-29

## 2025-04-29 RX ORDER — HEPARIN SODIUM 5000 [USP'U]/.5ML
5000 INJECTION, SOLUTION INTRAVENOUS; SUBCUTANEOUS EVERY 8 HOURS
Status: DISCONTINUED | OUTPATIENT
Start: 2025-04-30 | End: 2025-05-07 | Stop reason: HOSPADM

## 2025-04-29 RX ORDER — FIBRINOGEN (HUMAN) 700-1300MG
1150 KIT INTRAVENOUS
Status: DISCONTINUED | OUTPATIENT
Start: 2025-04-29 | End: 2025-04-29

## 2025-04-29 RX ORDER — OXYCODONE HYDROCHLORIDE 5 MG/1
5 TABLET ORAL EVERY 4 HOURS PRN
Status: DISCONTINUED | OUTPATIENT
Start: 2025-04-29 | End: 2025-05-07 | Stop reason: HOSPADM

## 2025-04-29 RX ORDER — CEFAZOLIN SODIUM/WATER 2 G/20 ML
2 SYRINGE (ML) INTRAVENOUS
Status: COMPLETED | OUTPATIENT
Start: 2025-04-29 | End: 2025-04-29

## 2025-04-29 RX ORDER — OXYCODONE HYDROCHLORIDE 10 MG/1
10 TABLET ORAL EVERY 4 HOURS PRN
Status: DISCONTINUED | OUTPATIENT
Start: 2025-04-29 | End: 2025-05-02

## 2025-04-29 RX ORDER — LIDOCAINE 40 MG/G
CREAM TOPICAL
Status: DISCONTINUED | OUTPATIENT
Start: 2025-04-29 | End: 2025-04-29 | Stop reason: HOSPADM

## 2025-04-29 RX ORDER — SODIUM CHLORIDE, SODIUM GLUCONATE, SODIUM ACETATE, POTASSIUM CHLORIDE AND MAGNESIUM CHLORIDE 526; 502; 368; 37; 30 MG/100ML; MG/100ML; MG/100ML; MG/100ML; MG/100ML
INJECTION, SOLUTION INTRAVENOUS CONTINUOUS PRN
Status: DISCONTINUED | OUTPATIENT
Start: 2025-04-29 | End: 2025-04-29

## 2025-04-29 RX ORDER — CEFAZOLIN SODIUM/WATER 2 G/20 ML
2 SYRINGE (ML) INTRAVENOUS SEE ADMIN INSTRUCTIONS
Status: DISCONTINUED | OUTPATIENT
Start: 2025-04-29 | End: 2025-04-29 | Stop reason: HOSPADM

## 2025-04-29 RX ORDER — DEXTROSE MONOHYDRATE 25 G/50ML
25-50 INJECTION, SOLUTION INTRAVENOUS
Status: DISCONTINUED | OUTPATIENT
Start: 2025-04-29 | End: 2025-04-29

## 2025-04-29 RX ORDER — ASPIRIN 81 MG/1
81 TABLET, CHEWABLE ORAL
Status: COMPLETED | OUTPATIENT
Start: 2025-04-29 | End: 2025-04-29

## 2025-04-29 RX ORDER — POTASSIUM CHLORIDE 29.8 MG/ML
20 INJECTION INTRAVENOUS ONCE
Status: COMPLETED | OUTPATIENT
Start: 2025-04-29 | End: 2025-04-29

## 2025-04-29 RX ORDER — DEXTROSE MONOHYDRATE 25 G/50ML
25-50 INJECTION, SOLUTION INTRAVENOUS
Status: DISCONTINUED | OUTPATIENT
Start: 2025-04-29 | End: 2025-04-30

## 2025-04-29 RX ORDER — FAMOTIDINE 20 MG/1
20 TABLET, FILM COATED ORAL
Status: COMPLETED | OUTPATIENT
Start: 2025-04-29 | End: 2025-04-29

## 2025-04-29 RX ORDER — CALCIUM GLUCONATE 20 MG/ML
2 INJECTION, SOLUTION INTRAVENOUS EVERY 6 HOURS PRN
Status: DISCONTINUED | OUTPATIENT
Start: 2025-04-29 | End: 2025-05-01

## 2025-04-29 RX ORDER — PHENYLEPHRINE HCL IN 0.9% NACL 50MG/250ML
.1-6 PLASTIC BAG, INJECTION (ML) INTRAVENOUS CONTINUOUS
Status: DISCONTINUED | OUTPATIENT
Start: 2025-04-29 | End: 2025-04-29 | Stop reason: HOSPADM

## 2025-04-29 RX ORDER — PROCHLORPERAZINE MALEATE 5 MG/1
5 TABLET ORAL EVERY 6 HOURS PRN
Status: DISCONTINUED | OUTPATIENT
Start: 2025-04-29 | End: 2025-05-01

## 2025-04-29 RX ORDER — HEPARIN SODIUM 1000 [USP'U]/ML
INJECTION, SOLUTION INTRAVENOUS; SUBCUTANEOUS PRN
Status: DISCONTINUED | OUTPATIENT
Start: 2025-04-29 | End: 2025-04-29

## 2025-04-29 RX ORDER — NALOXONE HYDROCHLORIDE 0.4 MG/ML
0.4 INJECTION, SOLUTION INTRAMUSCULAR; INTRAVENOUS; SUBCUTANEOUS
Status: DISCONTINUED | OUTPATIENT
Start: 2025-04-29 | End: 2025-05-07 | Stop reason: HOSPADM

## 2025-04-29 RX ORDER — CALCIUM GLUCONATE 20 MG/ML
1 INJECTION, SOLUTION INTRAVENOUS EVERY 6 HOURS PRN
Status: DISCONTINUED | OUTPATIENT
Start: 2025-04-29 | End: 2025-05-01

## 2025-04-29 RX ORDER — NICOTINE POLACRILEX 4 MG
15-30 LOZENGE BUCCAL
Status: DISCONTINUED | OUTPATIENT
Start: 2025-04-29 | End: 2025-04-30

## 2025-04-29 RX ORDER — METHOCARBAMOL 500 MG/1
250 TABLET ORAL EVERY 6 HOURS PRN
Status: DISCONTINUED | OUTPATIENT
Start: 2025-04-29 | End: 2025-04-30

## 2025-04-29 RX ORDER — ONDANSETRON 4 MG/1
4 TABLET, ORALLY DISINTEGRATING ORAL EVERY 6 HOURS PRN
Status: DISCONTINUED | OUTPATIENT
Start: 2025-04-29 | End: 2025-05-07 | Stop reason: HOSPADM

## 2025-04-29 RX ORDER — PROTAMINE SULFATE 10 MG/ML
INJECTION, SOLUTION INTRAVENOUS PRN
Status: DISCONTINUED | OUTPATIENT
Start: 2025-04-29 | End: 2025-04-29

## 2025-04-29 RX ORDER — EPINEPHRINE IN 0.9 % SOD CHLOR 5 MG/250ML
.01-.1 PLASTIC BAG, INJECTION (ML) INTRAVENOUS CONTINUOUS
Status: DISCONTINUED | OUTPATIENT
Start: 2025-04-29 | End: 2025-04-30

## 2025-04-29 RX ORDER — HYDROMORPHONE HCL IN WATER/PF 6 MG/30 ML
0.2 PATIENT CONTROLLED ANALGESIA SYRINGE INTRAVENOUS
Status: DISCONTINUED | OUTPATIENT
Start: 2025-04-29 | End: 2025-05-01

## 2025-04-29 RX ORDER — DEXMEDETOMIDINE HYDROCHLORIDE 4 UG/ML
.1-1.2 INJECTION, SOLUTION INTRAVENOUS CONTINUOUS
Status: DISCONTINUED | OUTPATIENT
Start: 2025-04-29 | End: 2025-04-29 | Stop reason: HOSPADM

## 2025-04-29 RX ORDER — CHLORHEXIDINE GLUCONATE ORAL RINSE 1.2 MG/ML
15 SOLUTION DENTAL EVERY 12 HOURS
Status: DISCONTINUED | OUTPATIENT
Start: 2025-04-29 | End: 2025-04-29

## 2025-04-29 RX ORDER — ACETAMINOPHEN 325 MG/1
975 TABLET ORAL ONCE
Status: DISCONTINUED | OUTPATIENT
Start: 2025-04-29 | End: 2025-04-29 | Stop reason: HOSPADM

## 2025-04-29 RX ORDER — AMOXICILLIN 250 MG
1 CAPSULE ORAL 2 TIMES DAILY
Status: DISCONTINUED | OUTPATIENT
Start: 2025-04-29 | End: 2025-05-01

## 2025-04-29 RX ORDER — LIDOCAINE 40 MG/G
CREAM TOPICAL
Status: DISCONTINUED | OUTPATIENT
Start: 2025-04-29 | End: 2025-05-07 | Stop reason: HOSPADM

## 2025-04-29 RX ORDER — SODIUM CHLORIDE, SODIUM LACTATE, POTASSIUM CHLORIDE, CALCIUM CHLORIDE 600; 310; 30; 20 MG/100ML; MG/100ML; MG/100ML; MG/100ML
INJECTION, SOLUTION INTRAVENOUS CONTINUOUS PRN
Status: DISCONTINUED | OUTPATIENT
Start: 2025-04-29 | End: 2025-04-29

## 2025-04-29 RX ORDER — DEXMEDETOMIDINE HYDROCHLORIDE 4 UG/ML
.2-.7 INJECTION, SOLUTION INTRAVENOUS CONTINUOUS
Status: DISCONTINUED | OUTPATIENT
Start: 2025-04-29 | End: 2025-04-29

## 2025-04-29 RX ORDER — NALOXONE HYDROCHLORIDE 0.4 MG/ML
0.2 INJECTION, SOLUTION INTRAMUSCULAR; INTRAVENOUS; SUBCUTANEOUS
Status: DISCONTINUED | OUTPATIENT
Start: 2025-04-29 | End: 2025-05-07 | Stop reason: HOSPADM

## 2025-04-29 RX ORDER — ASPIRIN 81 MG/1
162 TABLET, CHEWABLE ORAL
Status: COMPLETED | OUTPATIENT
Start: 2025-04-29 | End: 2025-04-29

## 2025-04-29 RX ORDER — EPINEPHRINE IN 0.9 % SOD CHLOR 5 MG/250ML
.01-.3 PLASTIC BAG, INJECTION (ML) INTRAVENOUS CONTINUOUS
Status: DISCONTINUED | OUTPATIENT
Start: 2025-04-29 | End: 2025-04-29 | Stop reason: HOSPADM

## 2025-04-29 RX ORDER — HEPARIN SOD,PORCINE/0.9 % NACL 30K/1000ML
INTRAVENOUS SOLUTION INTRAVENOUS
Status: DISCONTINUED | OUTPATIENT
Start: 2025-04-29 | End: 2025-04-29 | Stop reason: HOSPADM

## 2025-04-29 RX ORDER — NOREPINEPHRINE BITARTRATE 0.06 MG/ML
.01-.6 INJECTION, SOLUTION INTRAVENOUS CONTINUOUS
Status: DISCONTINUED | OUTPATIENT
Start: 2025-04-29 | End: 2025-04-29 | Stop reason: HOSPADM

## 2025-04-29 RX ORDER — NICOTINE POLACRILEX 4 MG
15-30 LOZENGE BUCCAL
Status: DISCONTINUED | OUTPATIENT
Start: 2025-04-29 | End: 2025-04-29

## 2025-04-29 RX ORDER — CHLORHEXIDINE GLUCONATE ORAL RINSE 1.2 MG/ML
10 SOLUTION DENTAL ONCE
Status: COMPLETED | OUTPATIENT
Start: 2025-04-29 | End: 2025-04-29

## 2025-04-29 RX ORDER — ONDANSETRON 2 MG/ML
4 INJECTION INTRAMUSCULAR; INTRAVENOUS EVERY 6 HOURS PRN
Status: DISCONTINUED | OUTPATIENT
Start: 2025-04-29 | End: 2025-05-07 | Stop reason: HOSPADM

## 2025-04-29 RX ORDER — NOREPINEPHRINE BITARTRATE 0.06 MG/ML
.01-.15 INJECTION, SOLUTION INTRAVENOUS CONTINUOUS
Status: DISCONTINUED | OUTPATIENT
Start: 2025-04-29 | End: 2025-04-30

## 2025-04-29 RX ORDER — POLYETHYLENE GLYCOL 3350 17 G/17G
17 POWDER, FOR SOLUTION ORAL DAILY
Status: DISCONTINUED | OUTPATIENT
Start: 2025-04-30 | End: 2025-05-07 | Stop reason: HOSPADM

## 2025-04-29 RX ORDER — GABAPENTIN 100 MG/1
100 CAPSULE ORAL
Status: COMPLETED | OUTPATIENT
Start: 2025-04-29 | End: 2025-04-29

## 2025-04-29 RX ORDER — FENTANYL CITRATE 50 UG/ML
INJECTION, SOLUTION INTRAMUSCULAR; INTRAVENOUS PRN
Status: DISCONTINUED | OUTPATIENT
Start: 2025-04-29 | End: 2025-04-29

## 2025-04-29 RX ORDER — CALCIUM CHLORIDE 100 MG/ML
INJECTION INTRAVENOUS; INTRAVENTRICULAR PRN
Status: DISCONTINUED | OUTPATIENT
Start: 2025-04-29 | End: 2025-04-29

## 2025-04-29 RX ORDER — HYDROMORPHONE HCL IN WATER/PF 6 MG/30 ML
0.4 PATIENT CONTROLLED ANALGESIA SYRINGE INTRAVENOUS
Status: DISCONTINUED | OUTPATIENT
Start: 2025-04-29 | End: 2025-05-01

## 2025-04-29 RX ORDER — BISACODYL 10 MG
10 SUPPOSITORY, RECTAL RECTAL DAILY PRN
Status: DISCONTINUED | OUTPATIENT
Start: 2025-05-02 | End: 2025-05-07 | Stop reason: HOSPADM

## 2025-04-29 RX ORDER — PANTOPRAZOLE SODIUM 40 MG/1
40 TABLET, DELAYED RELEASE ORAL DAILY
Status: DISCONTINUED | OUTPATIENT
Start: 2025-04-29 | End: 2025-05-07 | Stop reason: HOSPADM

## 2025-04-29 RX ORDER — ACETAMINOPHEN 325 MG/1
975 TABLET ORAL EVERY 8 HOURS
Status: DISCONTINUED | OUTPATIENT
Start: 2025-04-29 | End: 2025-05-07 | Stop reason: HOSPADM

## 2025-04-29 RX ORDER — ASPIRIN 81 MG/1
162 TABLET, CHEWABLE ORAL DAILY
Status: DISCONTINUED | OUTPATIENT
Start: 2025-04-29 | End: 2025-05-07 | Stop reason: HOSPADM

## 2025-04-29 RX ORDER — LIDOCAINE HYDROCHLORIDE 10 MG/ML
INJECTION, SOLUTION INFILTRATION; PERINEURAL
Status: COMPLETED | OUTPATIENT
Start: 2025-04-29 | End: 2025-04-29

## 2025-04-29 RX ADMIN — Medication 10 MG: at 11:03

## 2025-04-29 RX ADMIN — LIDOCAINE HYDROCHLORIDE 60 MG: 20 INJECTION, SOLUTION INFILTRATION; PERINEURAL at 09:05

## 2025-04-29 RX ADMIN — Medication 0.5 MCG/KG/MIN: at 09:55

## 2025-04-29 RX ADMIN — GABAPENTIN 100 MG: 100 CAPSULE ORAL at 06:22

## 2025-04-29 RX ADMIN — OXYCODONE HYDROCHLORIDE 5 MG: 5 TABLET ORAL at 20:30

## 2025-04-29 RX ADMIN — FENTANYL CITRATE 100 MCG: 50 INJECTION INTRAMUSCULAR; INTRAVENOUS at 14:12

## 2025-04-29 RX ADMIN — NOREPINEPHRINE BITARTRATE 6.4 MCG: 1 INJECTION, SOLUTION, CONCENTRATE INTRAVENOUS at 14:08

## 2025-04-29 RX ADMIN — FENTANYL CITRATE 150 MCG: 50 INJECTION INTRAMUSCULAR; INTRAVENOUS at 09:02

## 2025-04-29 RX ADMIN — AMINOCAPROIC ACID 1 G/HR: 250 INJECTION, SOLUTION INTRAVENOUS at 10:35

## 2025-04-29 RX ADMIN — SODIUM CHLORIDE, SODIUM LACTATE, POTASSIUM CHLORIDE, AND CALCIUM CHLORIDE 500 ML: .6; .31; .03; .02 INJECTION, SOLUTION INTRAVENOUS at 19:53

## 2025-04-29 RX ADMIN — CALCIUM CHLORIDE INJECTION 1 G: 100 INJECTION, SOLUTION INTRAVENOUS at 15:54

## 2025-04-29 RX ADMIN — POTASSIUM CHLORIDE 20 MEQ: 29.8 INJECTION, SOLUTION INTRAVENOUS at 17:27

## 2025-04-29 RX ADMIN — LIDOCAINE HYDROCHLORIDE 40 MG: 20 INJECTION, SOLUTION INFILTRATION; PERINEURAL at 13:47

## 2025-04-29 RX ADMIN — PROPOFOL 20 MG: 10 INJECTION, EMULSION INTRAVENOUS at 10:02

## 2025-04-29 RX ADMIN — HEPARIN SODIUM 38000 UNITS: 1000 INJECTION INTRAVENOUS; SUBCUTANEOUS at 10:58

## 2025-04-29 RX ADMIN — FENTANYL CITRATE 50 MCG: 50 INJECTION INTRAMUSCULAR; INTRAVENOUS at 14:14

## 2025-04-29 RX ADMIN — FENTANYL CITRATE 100 MCG: 50 INJECTION INTRAMUSCULAR; INTRAVENOUS at 12:44

## 2025-04-29 RX ADMIN — Medication 2 G: at 13:41

## 2025-04-29 RX ADMIN — NOREPINEPHRINE BITARTRATE 0.03 MCG/KG/MIN: 0.06 INJECTION, SOLUTION INTRAVENOUS at 11:08

## 2025-04-29 RX ADMIN — SENNOSIDES AND DOCUSATE SODIUM 1 TABLET: 50; 8.6 TABLET ORAL at 20:29

## 2025-04-29 RX ADMIN — NITROGLYCERIN 50 MCG: 10 INJECTION INTRAVENOUS at 13:56

## 2025-04-29 RX ADMIN — INSULIN HUMAN 1.5 UNITS/HR: 1 INJECTION, SOLUTION INTRAVENOUS at 06:32

## 2025-04-29 RX ADMIN — NOREPINEPHRINE BITARTRATE 6.4 MCG: 1 INJECTION, SOLUTION, CONCENTRATE INTRAVENOUS at 11:28

## 2025-04-29 RX ADMIN — Medication 20 MG: at 11:26

## 2025-04-29 RX ADMIN — NITROGLYCERIN 50 MCG: 10 INJECTION INTRAVENOUS at 13:57

## 2025-04-29 RX ADMIN — NOREPINEPHRINE BITARTRATE 6.4 MCG: 1 INJECTION, SOLUTION, CONCENTRATE INTRAVENOUS at 14:06

## 2025-04-29 RX ADMIN — FENTANYL CITRATE 50 MCG: 50 INJECTION INTRAMUSCULAR; INTRAVENOUS at 14:30

## 2025-04-29 RX ADMIN — Medication 12.5 MG: at 06:22

## 2025-04-29 RX ADMIN — CEFAZOLIN SODIUM 2 G: 2 SOLUTION INTRAVENOUS at 22:19

## 2025-04-29 RX ADMIN — FENTANYL CITRATE 50 MCG: 50 INJECTION INTRAMUSCULAR; INTRAVENOUS at 13:34

## 2025-04-29 RX ADMIN — NOREPINEPHRINE BITARTRATE 6.4 MCG: 1 INJECTION, SOLUTION, CONCENTRATE INTRAVENOUS at 14:23

## 2025-04-29 RX ADMIN — Medication 250 MG: at 20:30

## 2025-04-29 RX ADMIN — EPINEPHRINE 0.03 MCG/KG/MIN: 1 INJECTION INTRAMUSCULAR; INTRAVENOUS; SUBCUTANEOUS at 13:44

## 2025-04-29 RX ADMIN — ACETAMINOPHEN 975 MG: 325 TABLET ORAL at 20:30

## 2025-04-29 RX ADMIN — LIDOCAINE HYDROCHLORIDE 2 ML: 10 INJECTION, SOLUTION INFILTRATION; PERINEURAL at 08:55

## 2025-04-29 RX ADMIN — Medication 2 G: at 09:40

## 2025-04-29 RX ADMIN — Medication 10 MG: at 13:32

## 2025-04-29 RX ADMIN — NOREPINEPHRINE BITARTRATE 6.4 MCG: 1 INJECTION, SOLUTION, CONCENTRATE INTRAVENOUS at 11:29

## 2025-04-29 RX ADMIN — CHLORHEXIDINE GLUCONATE 10 ML: 1.2 SOLUTION ORAL at 06:23

## 2025-04-29 RX ADMIN — NOREPINEPHRINE BITARTRATE 6.4 MCG: 1 INJECTION, SOLUTION, CONCENTRATE INTRAVENOUS at 09:08

## 2025-04-29 RX ADMIN — SODIUM CHLORIDE, SODIUM LACTATE, POTASSIUM CHLORIDE, AND CALCIUM CHLORIDE 1000 ML: .6; .31; .03; .02 INJECTION, SOLUTION INTRAVENOUS at 16:51

## 2025-04-29 RX ADMIN — Medication 20 MG: at 12:22

## 2025-04-29 RX ADMIN — Medication 70 MG: at 09:07

## 2025-04-29 RX ADMIN — FAMOTIDINE 20 MG: 20 TABLET, FILM COATED ORAL at 06:36

## 2025-04-29 RX ADMIN — DEXMEDETOMIDINE HYDROCHLORIDE 0.4 MCG/KG/HR: 400 INJECTION INTRAVENOUS at 09:40

## 2025-04-29 RX ADMIN — Medication 20 MG: at 10:03

## 2025-04-29 RX ADMIN — PROPOFOL 80 MG: 10 INJECTION, EMULSION INTRAVENOUS at 09:06

## 2025-04-29 RX ADMIN — SODIUM CHLORIDE, SODIUM LACTATE, POTASSIUM CHLORIDE, AND CALCIUM CHLORIDE: .6; .31; .03; .02 INJECTION, SOLUTION INTRAVENOUS at 09:43

## 2025-04-29 RX ADMIN — PANTOPRAZOLE SODIUM 40 MG: 40 TABLET, DELAYED RELEASE ORAL at 20:30

## 2025-04-29 RX ADMIN — SODIUM CHLORIDE, SODIUM LACTATE, POTASSIUM CHLORIDE, CALCIUM CHLORIDE: 600; 310; 30; 20 INJECTION, SOLUTION INTRAVENOUS at 09:06

## 2025-04-29 RX ADMIN — SODIUM CHLORIDE, SODIUM GLUCONATE, SODIUM ACETATE, POTASSIUM CHLORIDE AND MAGNESIUM CHLORIDE: 526; 502; 368; 37; 30 INJECTION, SOLUTION INTRAVENOUS at 09:36

## 2025-04-29 RX ADMIN — ACETAMINOPHEN 975 MG: 325 TABLET ORAL at 06:21

## 2025-04-29 RX ADMIN — AMINOCAPROIC ACID 5 G: 250 INJECTION, SOLUTION INTRAVENOUS at 09:36

## 2025-04-29 RX ADMIN — FENTANYL CITRATE 100 MCG: 50 INJECTION INTRAMUSCULAR; INTRAVENOUS at 11:11

## 2025-04-29 RX ADMIN — Medication 10 MG: at 14:14

## 2025-04-29 RX ADMIN — LIDOCAINE HYDROCHLORIDE 100 MG: 20 INJECTION, SOLUTION INFILTRATION; PERINEURAL at 13:49

## 2025-04-29 RX ADMIN — PROTAMINE SULFATE 170 MG: 10 INJECTION, SOLUTION INTRAVENOUS at 13:55

## 2025-04-29 RX ADMIN — HYDROMORPHONE HYDROCHLORIDE 0.2 MG: 0.2 INJECTION, SOLUTION INTRAMUSCULAR; INTRAVENOUS; SUBCUTANEOUS at 22:20

## 2025-04-29 RX ADMIN — HYDROMORPHONE HYDROCHLORIDE 0.2 MG: 0.2 INJECTION, SOLUTION INTRAMUSCULAR; INTRAVENOUS; SUBCUTANEOUS at 18:08

## 2025-04-29 RX ADMIN — Medication 10 MG: at 10:38

## 2025-04-29 RX ADMIN — PROPOFOL 40 MG: 10 INJECTION, EMULSION INTRAVENOUS at 09:08

## 2025-04-29 RX ADMIN — ASPIRIN 81 MG CHEWABLE TABLET 162 MG: 81 TABLET CHEWABLE at 20:29

## 2025-04-29 RX ADMIN — ASPIRIN 81 MG CHEWABLE TABLET 162 MG: 81 TABLET CHEWABLE at 06:21

## 2025-04-29 RX ADMIN — SODIUM CHLORIDE, SODIUM GLUCONATE, SODIUM ACETATE, POTASSIUM CHLORIDE AND MAGNESIUM CHLORIDE: 526; 502; 368; 37; 30 INJECTION, SOLUTION INTRAVENOUS at 09:44

## 2025-04-29 RX ADMIN — HYDROMORPHONE HYDROCHLORIDE 0.5 MG: 1 INJECTION, SOLUTION INTRAMUSCULAR; INTRAVENOUS; SUBCUTANEOUS at 14:32

## 2025-04-29 RX ADMIN — Medication 100 MG: at 16:08

## 2025-04-29 RX ADMIN — FENTANYL CITRATE 150 MCG: 50 INJECTION INTRAMUSCULAR; INTRAVENOUS at 10:01

## 2025-04-29 ASSESSMENT — ACTIVITIES OF DAILY LIVING (ADL)
ADLS_ACUITY_SCORE: 47
ADLS_ACUITY_SCORE: 18
ADLS_ACUITY_SCORE: 47
ADLS_ACUITY_SCORE: 49
ADLS_ACUITY_SCORE: 47
ADLS_ACUITY_SCORE: 18
ADLS_ACUITY_SCORE: 49
ADLS_ACUITY_SCORE: 18
ADLS_ACUITY_SCORE: 47
ADLS_ACUITY_SCORE: 18

## 2025-04-29 NOTE — ANESTHESIA CARE TRANSFER NOTE
Patient: Rohit William    Procedure: Procedure(s):  Median sternotomy, on cardiopulmonary pump, coronary artery bypass graft x 3, left endoscopic harvest of greater saphenous vein, left internal mammary harvest, intraoperative transesophageal echocardiogram by anesthesia       Diagnosis: CAD (coronary artery disease) [I25.10]  Diagnosis Additional Information: No value filed.    Anesthesia Type:   General     Note:    Oropharynx: endotracheal tube in place  Level of Consciousness: iatrogenic sedation      Independent Airway: airway patency not satisfactory and stable  Dentition: dentition unchanged  Vital Signs Stable: post-procedure vital signs reviewed and stable  Report to RN Given: handoff report given  Patient transferred to: ICU    ICU Handoff: Call for PAUSE to initiate/utilize ICU HANDOFF, Identified Patient, Identified Responsible Provider, Reviewed the Pertinent Medical History, Discussed Surgical Course, Reviewed Intra-OP Anesthesia Management and Issues during Anesthesia, Set Expectations for Post Procedure Period and Allowed Opportunity for Questions and Acknowledgement of Understanding  Vitals:  Vitals Value Taken Time   BP     Temp     Pulse 88 04/29/25 1631   Resp     SpO2 97 % 04/29/25 1631   Vitals shown include unfiled device data.    Electronically Signed By: LINSEY Harris CRNA  April 29, 2025  4:32 PM

## 2025-04-29 NOTE — BRIEF OP NOTE
[facility]    Brief Operative Note    Pre-operative diagnosis: CAD (coronary artery disease) [I25.10]  Post-operative diagnosis Same as pre-operative diagnosis    Procedure: Median sternotomy, on cardiopulmonary pump, coronary artery bypass graft x 3, left endoscopic harvest of greater saphenous vein, left internal mammary harvest, intraoperative transesophageal echocardiogram by anesthesia, N/A - Chest    Surgeon: Surgeons and Role:     * Miguel Barnes MD - Primary     * Ruslan Mccoy MD - Assisting     * Ashwini Jensen PA-C - Assisting     * Helio Diaz MD - Assisting  Anesthesia: General   Estimated Blood Loss: 500 ml    Drains: 2 med, 1 L pleural  Specimens: * No specimens in log *  Findings:   LIMA very small and unable to use, targets small but okay for bypass. rSVG-LAD, rSVG-PDA, rSVG-RI .  Complications: None.  Implants: * No implants in log *          Ruslan Mccoy MD  Cardiothoracic Surgery Fellow  Pager: [phone]

## 2025-04-29 NOTE — OP NOTE
OPERATIVE DATE: 4/29/2025      PRE-OPERATIVE DIAGNOSIS:  Coronary artery disease  Patient Active Problem List   Diagnosis    Diabetes mellitus, type 2 (H)    CAD (coronary artery disease)    Status post coronary angiogram    CAD, multiple vessel       POST-OPERATIVE DIAGNOSIS:  Coronary artery disease  Patient Active Problem List   Diagnosis    Diabetes mellitus, type 2 (H)    CAD (coronary artery disease)    Status post coronary angiogram    CAD, multiple vessel         PROCEDURE:  PROCEDURE PERFORMED:  1.  Coronary artery bypass grafting x 3    - reversed saphenous vein graft to the right posterior descending coronary artery   - reversed saphenous vein graft to the ramus intermedius coronary artery   - reversed saphenous vein graft to the left anterior descending coronary artery   ** please note the left internal mammary artery was not used due to poor blood flow after transection and assessment  2.  Endoscopic vein harvest of the greater saphenous vein from the left lower extremity.  3. Transesophageal echocardiogram    SURGEON: Miguel Barnes MD    ASSISTANT: Ruslan Mccoy MD; Ashwini Jensen PA-C    ANESTHESIA: GETA    ESTIMATED BLOOD LOSS: 1000cc    OPERATIVE FINDINGS:    1) The left internal mammary artery was 1.5 mm in diameter and had poor blood flow - the LIMA was not used.    2) The greater saphenous vein from the left lower extremity was 4-5 mm in diameter and suitable for conduit.    3) The ascending aorta was free of calcified plaque.    4) The right posterior descending coronary artery was 1.5 mm in diameter and free of disease at the site of anastomosis.   5) The ramus intermedius coronary artery was 1.5 mm in diameter and free of disease at the site of anastomosis.   6) The left anterior descending coronary artery 2 mm in diameter and free of disease at the site of anastomosis.    7) After reperfusion and defibrillation, sinus rhythm resumed.    8) Left ventricular function was 60%  preoperatively and unchanged after bypass on low-dose inotropic support.    INDICATIONS:  SOFIE FERNANDES is a 70 year old male admitted with coronary artery disease.  We were asked to evaluate for CABG.  Risks and benefits of the operation were explained to the patient and their family including, but not limited to, bleeding, infection, stroke and even death.  They understood these risks and agreed to proceed electively.    OPERATIVE REPORT:  The patient was transferred to the operating room and positioned supine on the OR table.  General anesthesia was initiated by the anesthesia team.  Endotracheal intubation and central venous access was performed by anesthesia.  The patients neck, chest, abdomen and bilateral lower extremities were clipped, prepped and draped in sterile fashion.  A pre-procedure time-out was performed confirming the correct patient, correct site and correct procedure.    Median sternotomy and lower leg incisions were made.  The left internal mammary artery and left greater saphenous vein were harvested for conduit.   The patient was heparinized with 400mg/kg IV heparin.  Ascending aorta and right atrial appendage were cannulated for bypass.  Cardiopulmonary bypass was initiated with good flows.  Antegrade and retrograde cardioplegia catheters were placed.  The ascending aorta was cross clamped.  The heart was arrested with 1000cc of antegrade cold blood cardioplegia.  An additional 300cc of retrograde cardioplegia was administered.    The following grafts were constructed in end-to-side fashion using running 7-0 Prolene:    - A reversed saphenous vein graft was sewn to the proximal right posterior descending coronary artery  - A reversed saphenous vein graft was sewn to the ramus intermedius coronary artery  - A reversed saphenous vein graft was sewn to the left anterior descending coronary artery.     The pedicled left internal mammary artery had poor flow after transection and assessment.  The  mammary was small in caliber.  The pedicle was inspected and not twisted.  No imaging was available to assess for proximal subclavian stenosis.  The artery was ligated with 0 silk tie and metal clips.      The proximal anastomoses of the 3 vein grafts were constructed on the ascending aorta in end-to-side fashion using running 6-0 Prolene under a single crossclamp.      Retrograde hot shot was administered.  Aortic cross clamp was removed.  The heart was resuscitated.  IV calcium was administered.  Lungs were ventilated bilaterally.  The  patient was weaned from cardipulmonary bypass.  Remaining pump blood volume was returned via the arterial cannula.  Cannulas were removed and sites were made hemostatic with prolene sutures.  Heparin was reversed with protamine.  Mediastinal and pleural chest tubes were placed.  The sternum was approximated with stainless steel sternal wires.  The wound was closed using stratafix sutures.    The patient was then transferred from the operating bed to an ICU bed and transferred to the ICU in critical, but stable, condition.    All needle, sponge and instrument counts were correct at the end of the case.    Miguel Barnes  Cardiothoracic Surgery  Pager: [phone]

## 2025-04-29 NOTE — ANESTHESIA PROCEDURE NOTES
Airway       Patient location during procedure: OR       Procedure Start/Stop Times: 4/29/2025 9:10 AM  Staff -        Anesthesiologist:  Miguel Angel Morrison MD       Resident/Fellow: Kunal Reynolds MD       Performed By: resident  Consent for Airway        Urgency: elective  Indications and Patient Condition       Indications for airway management: arina-procedural       Induction type:intravenous       Mask difficulty assessment: 1 - vent by mask    Final Airway Details       Final airway type: endotracheal airway       Successful airway: ETT - single  Endotracheal Airway Details        ETT size (mm): 8.0       Cuffed: yes       Successful intubation technique: video laryngoscopy       VL Blade Size: Glidescope 4       Grade View of Cords: 1       Adjucts: stylet       Position: Right       Measured from: gums/teeth       Secured at (cm): 24       Bite block used: None    Post intubation assessment        Placement verified by: capnometry, equal breath sounds and chest rise        Number of attempts at approach: 1       Secured with: pink tape       Ease of procedure: easy       Dentition: Intact and Unchanged    Medication(s) Administered   Medication Administration Time: 4/29/2025 9:10 AM

## 2025-04-29 NOTE — ANESTHESIA PROCEDURE NOTES
Arterial Line Procedure Note    Pre-Procedure   Staff -        Anesthesiologist:  Miguel Angel Morrison MD       Resident/Fellow: Kunal Reynolds MD       Performed By: resident       Location: OR       Pre-Anesthestic Checklist: patient identified, IV checked, risks and benefits discussed, informed consent, monitors and equipment checked, pre-op evaluation and at physician/surgeon's request  Line Placement:   This line was placed Pre Induction starting at 4/29/2025 8:55 AM and ending at 4/29/2025 9:02 AM  Procedure   Procedure: arterial line       Laterality: left       Insertion Site: radial.  Sterile Prep        Standard elements of sterile barrier followed       Skin prep: Chloraprep  Insertion/Injection        Technique: ultrasound guided and Seldinger Technique        1. Ultrasound was used to evaluate the access site.       2. Artery evaluated via ultrasound for patency/adequacy.       3. Using real-time ultrasound the needle/catheter was observed entering the artery/vein.       5. The visualized structures were anatomically normal.       6. There were no apparent abnormal pathologic findings.       Catheter Type/Size: 20 G, 12 cm  Narrative         Secured by: suture       Tegaderm dressing used.       Complications: None apparent,        Arterial waveform: Yes        IBP within 10% of NIBP: Yes

## 2025-04-29 NOTE — ANESTHESIA POSTPROCEDURE EVALUATION
Patient: Rohit William    Procedure: Procedure(s):  Median sternotomy, on cardiopulmonary pump, coronary artery bypass graft x 3, left endoscopic harvest of greater saphenous vein, left internal mammary harvest, intraoperative transesophageal echocardiogram by anesthesia       Anesthesia Type:  General    Note:  Disposition: ICU            ICU Sign Out: Anesthesiologist/ICU physician sign out WAS performed   Postop Pain Control: Uneventful            Sign Out: Well controlled pain   PONV: No   Neuro/Psych: Uneventful            Sign Out: Acceptable/Baseline neuro status   Airway/Respiratory: Uneventful            Sign Out: AIRWAY IN SITU/Resp. Support               Airway in situ/Resp. Support: ETT   CV/Hemodynamics: Uneventful            Sign Out: Acceptable CV status   Other NRE: NONE   DID A NON-ROUTINE EVENT OCCUR? No           Last vitals:  Vitals:    04/29/25 0533 04/29/25 1615   BP: 139/85    Pulse: 89 88   Resp: 16    Temp: 36.5  C (97.7  F)    SpO2: 96% 97%       Electronically Signed By: Mg Hill MD  April 29, 2025  4:39 PM

## 2025-04-29 NOTE — H&P
CV ICU H&P  4/29/2025  5:07 AM     ASSESSMENT: Rohit William is a 70 year old male with PMH of multivessel CAD, dilated aortic root/ascending aorta, HTN, T2DM, obesity. Presents to [facility] for coronary artery bypass graft x 3, left endoscopic harvest of greater saphenous vein, left internal mammary harvest, intraoperative transesophageal echocardiogram by anesthesia by Dr. Barnes.    CO-MORBIDITIES:   Patient Active Problem List   Diagnosis    Diabetes mellitus, type 2 (H)    CAD (coronary artery disease)    Status post coronary angiogram    CAD, multiple vessel     Intra-op:   - EBL: 500 // Cell-saver: 485 // Blood products: 0 // Kcentra: 0 // Fibryga: 0   - UOP: 750 // Given 1.5 IVF throughout case  - Hemodynamic goals post-op: MAP>65, SBP <140     Post-op:  - Patient arrived to CVICU at 16:30 with pressor support of epi 0.05 and norepi 0.04. Sedation with precedex gtt. V wires set to VVI back up 50. Will plan to fast track.     PLAN:  Neuro/ pain/ sedation:  - Monitor neurological status. Notify the MD for any acute changes in exam.    # Acute postoperative pain  - Dexmedetomidine infusion    - Scheduled: Tylenol  - PRN: Tylenol, oxycodone, Dilaudid    # Sedation while on mechanical ventilation   - Gtt: precedex   - Wean for RASS goal 0- -1    Pulmonary care:   # Post-operative mechanical ventilation   Resp: 16, Vent Mode: VC/AC, Resp Rate (Set): 14 breaths/min, Tidal Volume (Set, mL): 450 mL, PEEP (cm H2O): 5 cmH2O, Resp Rate (Set): 14 breaths/min, Tidal Volume (Set, mL): 450 mL, PEEP (cm H2O): 5 cmH2O  - Goal SpO2 > 92%  - Encourage IS q15-30 minutes when awake after extubation   - CXR on arrival, then daily   - Monitor CT output   - Wean vent as able; Trend ABG's     Cardiovascular:    # Cardiogenic shock  # Hx multivessel CAD s/p coronary artery bypass graft x3  Pre CPBP: Normal EF. Normal right and left ventricular cavity size and function. 1+ regurgitation of mitral valve, tricuspid valve, and pulmonic  valve. Ascending aorta dilated to 4.1 cm; dissection not presentation.   Post CPBP:  Unchanged biventricular function. No regional wall motion changes seen.   - VVI backup 50   - Cap TPW when able post-op   - Monitor hemodynamics closely  - Goal MAP >65, SBP <140   - Epi, NE gtts for inotropic and pressor support, wean as able  - Hold PTA ASA 81 mg, losartan-hydrochlorothiazide 100-25 mg  - Hold atorvastatin 10 mg   - Hold BB   - ASA: start tomorrow    GI care / Nutrition:   # At risk for protein calorie malnutrition   - NPO, bedside swallow eval once extubated  - PPI  - Bowel regimen: MiraLAX, senna  - OG tube for meds      Renal / Fluids / Electrolytes:   # Hypovolemia  BL creat appears to be ~ 0.6-0.8  - Strict I/O, daily weights, avoid/limit nephrotoxins  - Replete lytes PRN per protocol  - Trend lactate     Endocrine:    # Stress hyperglycemia  Preop A1c 8.6  - Insulin gtt  - Goal BG <180 for optimal healing    ID / Antibiotics:  # Stress induced leukocytosis  - To complete perioperative regimen (ancef)  - Monitor fever curve, WBC, and inflammatory markers as appropriate    Heme:     # Acute blood loss anemia  # Acute thrombocytopenia  No s/sx active bleeding  - Trending CBC   - Hgb goal > 7.0  - Hemoglobin 11.2 (4/29)    MSK / Skin:  # Sternotomy    # Surgical Incision  - Sternal precautions, postop incision management protocol  - PT/OT/CR    Prophylaxis:     - Mechanical DVT ppx  - Chemical DVT ppx: start SQH tomorrow  - PPI    Lines / Tubes / Drains:  - ETT  - RIJ CVC, PA catheter  - Arterial Line (radial)  - CTs meds x2, pleural x1  - Epstein  - OG    Disposition:  - CVICU      Patient seen, findings and plan discussed with CVICU staff and cardiothoracic surgeons and fellow.    I spent a total of 60 minutes providing critical care services at the bedside, and on the critical care unit, evaluating the patient, directing care and reviewing laboratory values and radiologic reports for the patient. Billing time  separate from procedural time.     Mickie Schreiber MD        ====================================    HPI:   Presents to CVICU intubated and sedated.    PAST MEDICAL HISTORY:   Past Medical History:   Diagnosis Date    CAD (coronary artery disease)     Diabetes (H)     Hypertension        PAST SURGICAL HISTORY:   Past Surgical History:   Procedure Laterality Date    COLONOSCOPY N/A 12/07/2022    Procedure: COLONOSCOPY, FLEXIBLE, WITH LESION REMOVAL USING SNARE;  Surgeon: Gomez Zurita MD;  Location: [facility] OR    COLONOSCOPY WITH CO2 INSUFFLATION N/A 12/07/2022    Procedure: COLONOSCOPY, WITH CO2 INSUFFLATION;  Surgeon: Goemz Zurita MD;  Location: [facility] OR    CV CORONARY ANGIOGRAM N/A 02/13/2025    Procedure: Coronary Angiogram;  Surgeon: Carlos Lepe MD;  [facility]    CV INSTANTANEOUS WAVE-FREE RATIO N/A 02/13/2025    Procedure: Instantaneous Wave-Free Ratio;  Surgeon: Carlos Lepe MD;  [facility]    ORTHOPEDIC SURGERY Left 2003       FAMILY HISTORY:   Family History   Problem Relation Age of Onset    Alzheimer Disease Mother     Heart Disease Father     Coronary Artery Disease Father     Diabetes Sister     Pulmonary Embolism Sister     Coronary Artery Disease Brother     Diabetes Brother     Anesthesia Reaction No family hx of        SOCIAL HISTORY:   Social History     Tobacco Use    Smoking status: Some Days     Types: Cigars    Smokeless tobacco: Never    Tobacco comments:     Rare cigar   Substance Use Topics    Alcohol use: Not Currently     Comment: 1-2 drinks month         OBJECTIVE:  1. VITAL SIGNS:   Temp:  [36.5  C (97.7  F)] 36.5  C (97.7  F)  Pulse:  [89] 89  Resp:  [16] 16  BP: (139)/(85) 139/85  SpO2:  [96 %] 96 %    Resp: 16      2. INTAKE/ OUTPUT:   I/O last 3 completed shifts:  In: 1785 [I.V.:1200; Other:485; IV Piggyback:100]  Out: 700 [Urine:700]      3. PHYSICAL EXAMINATION:    Neuro: Sedated, intubated   HEENT: Normocephalic, atraumatic. PERRL, and nonicteric.   CV: RRR on monitor, S1S2, all extremities well perfused   Respiratory: CTAB, Normal respiratory effort on RA, equal chest rise b/l   GI: Abdomen soft and non-tender to light palpation. Non-distended   : Voiding with Epstein   MSK: Moves all extremities well with normal appearing strength. Sensation intact in all upper/lower extremities.     Skin: Normal color. No rashes or skin lesions.   Psych: Cooperative, congruent mood and affect.       4. INVESTIGATIONS:   Arterial Blood Gases   Recent Labs   Lab 04/29/25  1445 04/29/25  1405 04/29/25  1339 04/29/25  1308   PH 7.35 7.38 7.37 7.38   PCO2 39 39 42 42   PO2 231* 293* 359* 410*   HCO3 21 23 24 25     Complete Blood Count   Recent Labs   Lab 04/29/25  1445 04/29/25  1405 04/29/25  1356 04/29/25  1339   WBC  --   --  22.7*  --    HGB 11.2* 9.5* 9.1* 9.0*   PLT  --   --  193  --      Basic Metabolic Panel  Recent Labs   Lab 04/29/25  1445 04/29/25  1405 04/29/25  1339 04/29/25  1308    140 139 138   POTASSIUM 3.6 4.0 4.9 4.7   * 191* 185* 191*     Liver Function Tests  Recent Labs   Lab 04/29/25  1356   INR 1.43*     Pancreatic Enzymes  No lab results found in last 7 days.  Coagulation Profile  Recent Labs   Lab 04/29/25  1356   INR 1.43*   PTT 29         5. RADIOLOGY:       Recent Results (from the past 24 hours)   TRAVON with Report    Narrative    Miguel Angel Morrison MD     4/29/2025  2:30 PM  Perioperative TRAVON Procedure Note    Staff -        Anesthesiologist:  Miguel Angel Morrison MD       Performed By: anesthesiologist  Preanesthesia Checklist:  Patient identified, IV assessed, risks and   benefits discussed, monitors and equipment assessed, procedure being   performed at surgeon's request and anesthesia consent obtained.    TRAVON Probe Insertion    Probe Status PRE Insertion: NO obvious damage  Probe type:  Adult 3D  Bite block used:   Soft  Insertion Technique: Easy, no  oropharyngeal manipulation  Insertion complications: None obvious  Billing Report:TRAVON report by Anesthesiologist (See Separate Report note)  Probe Status POST Removal: NO obvious damage    TRAVON Report  General Procedure Information  Images for this study have been archived.  Modalities: 2D, 3D, CW Doppler, PW Doppler and Color flow mapping  Echocardiographic and Doppler Measurements  Right Ventricle:  Cavity size normal.     Thrombus not present.    Global   function normal.     Left Ventricle:  Cavity size normal.     Thrombus not present.   Global   Function normal.       Ventricular Regional Function:  1- Basal Anteroseptal:  normal  2- Basal Anterior:  normal  3- Basal Anterolateral:  normal  4- Basal Inferolateral:  normal  5- Basal Inferior:  normal  6- Basal Inferoseptal:  normal  7- Mid Anteroseptal:  normal  8- Mid Anterior:  normal  9- Mid Anterolateral:  normal  10- Mid Inferolateral:  normal  11- Mid Inferior:  normal  12- Mid Inferoseptal:  normal  13- Apical Anterior:  normal  14- Apical Lateral:  normal  15- Apical Inferior:  normal  16- Apical Septal:  normal  17- [place]:  normal    Valves  Aortic Valve: Annulus normal.  Stenosis not present.  Regurgitation +1.    Leaflets thickened.    Mitral Valve: Stenosis not present.  Regurgitation +1.    Tricuspid Valve: Regurgitation +1.    Pulmonic Valve: Regurgitation +1.      Aorta: Ascending Aorta: Size dilated.  Diameter 4.1 cm.  Dissection not   present.  Plaque thickness less than 3 mm.  Mobile plaque not present.    Aortic Arch:   Dissection not present.   Plaque thickness less than 3 mm.     Mobile plaque not present.    Descending Aorta:   Dissection not present.   Plaque thickness less than 3   mm.   Mobile plaque not present.      Right Atrium:   Spontaneous echo contrast not present.   Thrombus not   present.   Tumor not present.   Device not present.     Left Atrium: Spontaneous echo contrast not present.  Thrombus not   present.  Tumor not present.   Device not present.    Left atrial appendage normal.     Atrial Septum: Intra-atrial septal morphology normal.         Diastolic Function Measurements:  Diastolic Dysfunction Grade= I.  E=  35.2 ms.  A=  53.7 ms.  E/A Ratio=    0.7.  DT=  ms.  S/D= .  IVRT= ms.  Other Findings:   Pericardium:  normal. Pleural Effusion:  none.  Pulmonary Venous Flow:    normal. Cornoary sinus catheter present.    Post Intervention Findings  Procedure(s) performed:  CABG.  Regional wall motion:. Surgeon(s) notified   of all postintervention findings: Yes (In real time).             Post Intervention comments: Unchanged biventricular function. No regional   wall motion changes seen  Unchanged valvular function.AI appeared worse immediately after separation   from bypass, improved subsequently  No clot in NEVA  No obvious dissection in ascending aorta  No pericardial or pleural effusions seen  Post CPB exam performed on epinephrine and norepinephrine drips.    Echocardiogram Comments  Echocardiogram comments: Guidewire for central line seen in RA,   subsequently removed.  Mild Tricuspid regurgitation seen, mild pulmonic insufficiency seen. Mild   mitral regurgitation seen, jet between A3/P3  Trileaflet aortic valve with central AI jet seen.  Doppler data not   reliable for valve interrogation due to enlarged ascending aorta . Based   on VC of 0.259 cm and the width of the AI jet/LVOT ratio AI appears to be   mild(was trace on pre op echo)    .       =========================================

## 2025-04-29 NOTE — ANESTHESIA PROCEDURE NOTES
Perioperative TRAVON Procedure Note    Staff -        Anesthesiologist:  Miguel Angel Morrison MD       Performed By: anesthesiologist  Preanesthesia Checklist:  Patient identified, IV assessed, risks and benefits discussed, monitors and equipment assessed, procedure being performed at surgeon's request and anesthesia consent obtained.    TRAVON Probe Insertion    Probe Status PRE Insertion: NO obvious damage  Probe type:  Adult 3D  Bite block used:   Soft  Insertion Technique: Easy, no oropharyngeal manipulation  Insertion complications: None obvious  Billing Report:TRAVON report by Anesthesiologist (See Separate Report note)  Probe Status POST Removal: NO obvious damage    TRAVON Report  General Procedure Information  Images for this study have been archived.  Modalities: 2D, 3D, CW Doppler, PW Doppler and Color flow mapping  Echocardiographic and Doppler Measurements  Right Ventricle:  Cavity size normal.     Thrombus not present.    Global function normal.     Left Ventricle:  Cavity size normal.     Thrombus not present.   Global Function normal.       Ventricular Regional Function:  1- Basal Anteroseptal:  normal  2- Basal Anterior:  normal  3- Basal Anterolateral:  normal  4- Basal Inferolateral:  normal  5- Basal Inferior:  normal  6- Basal Inferoseptal:  normal  7- Mid Anteroseptal:  normal  8- Mid Anterior:  normal  9- Mid Anterolateral:  normal  10- Mid Inferolateral:  normal  11- Mid Inferior:  normal  12- Mid Inferoseptal:  normal  13- Apical Anterior:  normal  14- Apical Lateral:  normal  15- Apical Inferior:  normal  16- Apical Septal:  normal  17- [place]:  normal    Valves  Aortic Valve: Annulus normal.  Stenosis not present.  Regurgitation +1.  Leaflets thickened.    Mitral Valve: Stenosis not present.  Regurgitation +1.    Tricuspid Valve: Regurgitation +1.    Pulmonic Valve: Regurgitation +1.      Aorta: Ascending Aorta: Size dilated.  Diameter 4.1 cm.  Dissection not present.  Plaque thickness less than 3 mm.   Mobile plaque not present.    Aortic Arch:   Dissection not present.   Plaque thickness less than 3 mm.   Mobile plaque not present.    Descending Aorta:   Dissection not present.   Plaque thickness less than 3 mm.   Mobile plaque not present.      Right Atrium:   Spontaneous echo contrast not present.   Thrombus not present.   Tumor not present.   Device not present.     Left Atrium: Spontaneous echo contrast not present.  Thrombus not present.  Tumor not present.  Device not present.    Left atrial appendage normal.     Atrial Septum: Intra-atrial septal morphology normal.         Diastolic Function Measurements:  Diastolic Dysfunction Grade= I.  E=  35.2 ms.  A=  53.7 ms.  E/A Ratio=  0.7.  DT=  ms.  S/D= .  IVRT= ms.  Other Findings:   Pericardium:  normal. Pleural Effusion:  none.  Pulmonary Venous Flow:  normal. Cornoary sinus catheter present.    Post Intervention Findings  Procedure(s) performed:  CABG.  Regional wall motion:. Surgeon(s) notified of all postintervention findings: Yes (In real time).             Post Intervention comments: Unchanged biventricular function. No regional wall motion changes seen  Unchanged valvular function.AI appeared worse immediately after separation from bypass, improved subsequently  No clot in NEVA  No obvious dissection in ascending aorta  No pericardial or pleural effusions seen  Post CPB exam performed on epinephrine and norepinephrine drips.    Echocardiogram Comments  Echocardiogram comments: Guidewire for central line seen in RA, subsequently removed.  Mild Tricuspid regurgitation seen, mild pulmonic insufficiency seen. Mild mitral regurgitation seen, jet between A3/P3  Trileaflet aortic valve with central AI jet seen.  Doppler data not reliable for valve interrogation due to enlarged ascending aorta . Based on VC of 0.259 cm and the width of the AI jet/LVOT ratio AI appears to be mild(was trace on pre op echo)    .

## 2025-04-29 NOTE — ANESTHESIA PROCEDURE NOTES
Central Line/PA Catheter Placement    Pre-Procedure   Staff -        Anesthesiologist:  Miguel Angel Morrison MD       Resident/Fellow: Kunal Reynolds MD       Performed By: resident       Location: OR       Pre-Anesthestic Checklist: patient identified, IV checked, risks and benefits discussed, informed consent, monitors and equipment checked, pre-op evaluation and at physician/surgeon's request  Line Placement:   This line was placed Post Induction    Procedure   Procedure: central line and elective       Laterality: right       Insertion Site: internal jugular.       Patient Position: Trendelenburg  Sterile Prep        All elements of maximal sterile barrier technique followed       Patient Prep/Sterile Barriers: draped, hand hygiene, gloves , hat , mask , draped, gown, sterile gel and probe cover       Skin prep: Chloraprep  Insertion/Injection        Technique: ultrasound guided and Seldinger Technique        1. Ultrasound was used to evaluate the access site.       2. Vein evaluated via ultrasound for patency/adequacy.       3. Using real-time ultrasound the needle/catheter was observed entering the artery/vein.       5. The visualized structures were anatomically normal.       6. There were no apparent abnormal pathologic findings.       Introducer Type: 9 Fr, 2-lumen MAC        Type: PA/CVC with Introducer       Catheter Size: 9 Fr       Catheter Length: 11.5       Number of Lumens: double lumen  Narrative         Secured by: suture       Tegaderm and Biopatch dressing used.       Complications: None apparent,        blood aspirated from all lumens,        All lumens flushed: Yes       Verification method: Placement to be verified post-op, Ultrasound and TRAVON   Comments:  Wire removed. Line secured and hands-free triple lumen placed through introducer, locked at 20 cm, all lumens draw and flush.

## 2025-04-30 ENCOUNTER — APPOINTMENT (OUTPATIENT)
Dept: GENERAL RADIOLOGY | Facility: CLINIC | Age: 70
DRG: 235 | End: 2025-04-30
Attending: SURGERY
Payer: COMMERCIAL

## 2025-04-30 ENCOUNTER — APPOINTMENT (OUTPATIENT)
Dept: GENERAL RADIOLOGY | Facility: CLINIC | Age: 70
DRG: 235 | End: 2025-04-30
Attending: THORACIC SURGERY (CARDIOTHORACIC VASCULAR SURGERY)
Payer: COMMERCIAL

## 2025-04-30 ENCOUNTER — APPOINTMENT (OUTPATIENT)
Dept: OCCUPATIONAL THERAPY | Facility: CLINIC | Age: 70
DRG: 235 | End: 2025-04-30
Attending: SURGERY
Payer: COMMERCIAL

## 2025-04-30 LAB
ALBUMIN SERPL BCG-MCNC: 3.1 G/DL (ref 3.5–5.2)
ALBUMIN SERPL BCG-MCNC: 3.2 G/DL (ref 3.5–5.2)
ALP SERPL-CCNC: 35 U/L (ref 40–150)
ALP SERPL-CCNC: 38 U/L (ref 40–150)
ALT SERPL W P-5'-P-CCNC: 12 U/L (ref 0–70)
ALT SERPL W P-5'-P-CCNC: 13 U/L (ref 0–70)
ANION GAP SERPL CALCULATED.3IONS-SCNC: 11 MMOL/L (ref 7–15)
ANION GAP SERPL CALCULATED.3IONS-SCNC: 7 MMOL/L (ref 7–15)
AST SERPL W P-5'-P-CCNC: 38 U/L (ref 0–45)
AST SERPL W P-5'-P-CCNC: 43 U/L (ref 0–45)
ATRIAL RATE - MUSE: 82 BPM
ATRIAL RATE - MUSE: 95 BPM
BILIRUB SERPL-MCNC: 0.3 MG/DL
BILIRUB SERPL-MCNC: 0.4 MG/DL
BUN SERPL-MCNC: 16.8 MG/DL (ref 8–23)
BUN SERPL-MCNC: 17.5 MG/DL (ref 8–23)
CA-I BLD-MCNC: 4.8 MG/DL (ref 4.4–5.2)
CALCIUM SERPL-MCNC: 8.4 MG/DL (ref 8.8–10.4)
CALCIUM SERPL-MCNC: 8.5 MG/DL (ref 8.8–10.4)
CHLORIDE SERPL-SCNC: 107 MMOL/L (ref 98–107)
CHLORIDE SERPL-SCNC: 108 MMOL/L (ref 98–107)
CREAT SERPL-MCNC: 0.77 MG/DL (ref 0.67–1.17)
CREAT SERPL-MCNC: 0.83 MG/DL (ref 0.67–1.17)
DIASTOLIC BLOOD PRESSURE - MUSE: NORMAL MMHG
DIASTOLIC BLOOD PRESSURE - MUSE: NORMAL MMHG
EGFRCR SERPLBLD CKD-EPI 2021: >90 ML/MIN/1.73M2
EGFRCR SERPLBLD CKD-EPI 2021: >90 ML/MIN/1.73M2
ERYTHROCYTE [DISTWIDTH] IN BLOOD BY AUTOMATED COUNT: 14.6 % (ref 10–15)
GLUCOSE BLDC GLUCOMTR-MCNC: 117 MG/DL (ref 70–99)
GLUCOSE BLDC GLUCOMTR-MCNC: 118 MG/DL (ref 70–99)
GLUCOSE BLDC GLUCOMTR-MCNC: 118 MG/DL (ref 70–99)
GLUCOSE BLDC GLUCOMTR-MCNC: 121 MG/DL (ref 70–99)
GLUCOSE BLDC GLUCOMTR-MCNC: 123 MG/DL (ref 70–99)
GLUCOSE BLDC GLUCOMTR-MCNC: 131 MG/DL (ref 70–99)
GLUCOSE BLDC GLUCOMTR-MCNC: 138 MG/DL (ref 70–99)
GLUCOSE BLDC GLUCOMTR-MCNC: 150 MG/DL (ref 70–99)
GLUCOSE BLDC GLUCOMTR-MCNC: 162 MG/DL (ref 70–99)
GLUCOSE BLDC GLUCOMTR-MCNC: 168 MG/DL (ref 70–99)
GLUCOSE BLDC GLUCOMTR-MCNC: 175 MG/DL (ref 70–99)
GLUCOSE BLDC GLUCOMTR-MCNC: 184 MG/DL (ref 70–99)
GLUCOSE BLDC GLUCOMTR-MCNC: 209 MG/DL (ref 70–99)
GLUCOSE SERPL-MCNC: 155 MG/DL (ref 70–99)
GLUCOSE SERPL-MCNC: 205 MG/DL (ref 70–99)
HCO3 SERPL-SCNC: 19 MMOL/L (ref 22–29)
HCO3 SERPL-SCNC: 22 MMOL/L (ref 22–29)
HCT VFR BLD AUTO: 25.9 % (ref 40–53)
HGB BLD-MCNC: 8.6 G/DL (ref 13.3–17.7)
HGB BLD-MCNC: 9 G/DL (ref 13.3–17.7)
INTERPRETATION ECG - MUSE: NORMAL
INTERPRETATION ECG - MUSE: NORMAL
LACTATE SERPL-SCNC: 1.2 MMOL/L (ref 0.7–2)
LACTATE SERPL-SCNC: 2 MMOL/L (ref 0.7–2)
LACTATE SERPL-SCNC: 3.9 MMOL/L (ref 0.7–2)
MAGNESIUM SERPL-MCNC: 1.9 MG/DL (ref 1.7–2.3)
MCH RBC QN AUTO: 29.8 PG (ref 26.5–33)
MCHC RBC AUTO-ENTMCNC: 34.7 G/DL (ref 31.5–36.5)
MCV RBC AUTO: 86 FL (ref 78–100)
P AXIS - MUSE: 22 DEGREES
P AXIS - MUSE: 24 DEGREES
PHOSPHATE SERPL-MCNC: 3 MG/DL (ref 2.5–4.5)
PLATELET # BLD AUTO: 172 10E3/UL (ref 150–450)
POTASSIUM SERPL-SCNC: 4 MMOL/L (ref 3.4–5.3)
POTASSIUM SERPL-SCNC: 4.1 MMOL/L (ref 3.4–5.3)
PR INTERVAL - MUSE: 156 MS
PR INTERVAL - MUSE: 166 MS
PROT SERPL-MCNC: 4.9 G/DL (ref 6.4–8.3)
PROT SERPL-MCNC: 5.2 G/DL (ref 6.4–8.3)
QRS DURATION - MUSE: 78 MS
QRS DURATION - MUSE: 80 MS
QT - MUSE: 368 MS
QT - MUSE: 374 MS
QTC - MUSE: 429 MS
QTC - MUSE: 469 MS
R AXIS - MUSE: -1 DEGREES
R AXIS - MUSE: 4 DEGREES
RBC # BLD AUTO: 3.02 10E6/UL (ref 4.4–5.9)
SODIUM SERPL-SCNC: 136 MMOL/L (ref 135–145)
SODIUM SERPL-SCNC: 138 MMOL/L (ref 135–145)
SYSTOLIC BLOOD PRESSURE - MUSE: NORMAL MMHG
SYSTOLIC BLOOD PRESSURE - MUSE: NORMAL MMHG
T AXIS - MUSE: 12 DEGREES
T AXIS - MUSE: 20 DEGREES
VENTRICULAR RATE- MUSE: 82 BPM
VENTRICULAR RATE- MUSE: 95 BPM
WBC # BLD AUTO: 15.8 10E3/UL (ref 4–11)

## 2025-04-30 PROCEDURE — 71045 X-RAY EXAM CHEST 1 VIEW: CPT | Mod: 77

## 2025-04-30 PROCEDURE — 85027 COMPLETE CBC AUTOMATED: CPT | Performed by: SURGERY

## 2025-04-30 PROCEDURE — 999N000156 HC STATISTIC RCP CONSULT EA 30 MIN

## 2025-04-30 PROCEDURE — 71045 X-RAY EXAM CHEST 1 VIEW: CPT

## 2025-04-30 PROCEDURE — 250N000013 HC RX MED GY IP 250 OP 250 PS 637: Performed by: SURGERY

## 2025-04-30 PROCEDURE — 85018 HEMOGLOBIN: CPT

## 2025-04-30 PROCEDURE — 999N000157 HC STATISTIC RCP TIME EA 10 MIN

## 2025-04-30 PROCEDURE — 83605 ASSAY OF LACTIC ACID: CPT

## 2025-04-30 PROCEDURE — 250N000013 HC RX MED GY IP 250 OP 250 PS 637

## 2025-04-30 PROCEDURE — 82565 ASSAY OF CREATININE: CPT | Performed by: SURGERY

## 2025-04-30 PROCEDURE — 82330 ASSAY OF CALCIUM: CPT | Performed by: SURGERY

## 2025-04-30 PROCEDURE — 94660 CPAP INITIATION&MGMT: CPT

## 2025-04-30 PROCEDURE — 83735 ASSAY OF MAGNESIUM: CPT | Performed by: SURGERY

## 2025-04-30 PROCEDURE — 250N000011 HC RX IP 250 OP 636

## 2025-04-30 PROCEDURE — 250N000013 HC RX MED GY IP 250 OP 250 PS 637: Performed by: PHYSICIAN ASSISTANT

## 2025-04-30 PROCEDURE — 97535 SELF CARE MNGMENT TRAINING: CPT | Mod: GO

## 2025-04-30 PROCEDURE — 258N000003 HC RX IP 258 OP 636

## 2025-04-30 PROCEDURE — 84100 ASSAY OF PHOSPHORUS: CPT | Performed by: SURGERY

## 2025-04-30 PROCEDURE — 250N000009 HC RX 250: Performed by: SURGERY

## 2025-04-30 PROCEDURE — 97530 THERAPEUTIC ACTIVITIES: CPT | Mod: GO

## 2025-04-30 PROCEDURE — 250N000012 HC RX MED GY IP 250 OP 636 PS 637: Performed by: PHYSICIAN ASSISTANT

## 2025-04-30 PROCEDURE — 71045 X-RAY EXAM CHEST 1 VIEW: CPT | Mod: 26 | Performed by: RADIOLOGY

## 2025-04-30 PROCEDURE — 250N000011 HC RX IP 250 OP 636: Performed by: SURGERY

## 2025-04-30 PROCEDURE — 999N000127 HC STATISTIC PERIPHERAL IV START W US GUIDANCE

## 2025-04-30 PROCEDURE — 97165 OT EVAL LOW COMPLEX 30 MIN: CPT | Mod: GO

## 2025-04-30 PROCEDURE — 80053 COMPREHEN METABOLIC PANEL: CPT | Performed by: THORACIC SURGERY (CARDIOTHORACIC VASCULAR SURGERY)

## 2025-04-30 PROCEDURE — 99233 SBSQ HOSP IP/OBS HIGH 50: CPT | Mod: 24 | Performed by: INTERNAL MEDICINE

## 2025-04-30 PROCEDURE — 250N000013 HC RX MED GY IP 250 OP 250 PS 637: Performed by: THORACIC SURGERY (CARDIOTHORACIC VASCULAR SURGERY)

## 2025-04-30 PROCEDURE — 120N000005 HC R&B MS OVERFLOW UMMC

## 2025-04-30 RX ORDER — ATORVASTATIN CALCIUM 10 MG/1
10 TABLET, FILM COATED ORAL EVERY EVENING
Status: DISCONTINUED | OUTPATIENT
Start: 2025-04-30 | End: 2025-05-07 | Stop reason: HOSPADM

## 2025-04-30 RX ORDER — METHOCARBAMOL 500 MG/1
500 TABLET, FILM COATED ORAL 4 TIMES DAILY
Status: DISCONTINUED | OUTPATIENT
Start: 2025-04-30 | End: 2025-05-03

## 2025-04-30 RX ORDER — DEXTROSE MONOHYDRATE 25 G/50ML
25-50 INJECTION, SOLUTION INTRAVENOUS
Status: DISCONTINUED | OUTPATIENT
Start: 2025-04-30 | End: 2025-05-07 | Stop reason: HOSPADM

## 2025-04-30 RX ORDER — MAGNESIUM OXIDE 400 MG/1
400 TABLET ORAL EVERY 4 HOURS
Status: COMPLETED | OUTPATIENT
Start: 2025-04-30 | End: 2025-04-30

## 2025-04-30 RX ORDER — NICOTINE POLACRILEX 4 MG
15-30 LOZENGE BUCCAL
Status: DISCONTINUED | OUTPATIENT
Start: 2025-04-30 | End: 2025-05-07 | Stop reason: HOSPADM

## 2025-04-30 RX ORDER — FUROSEMIDE 10 MG/ML
40 INJECTION INTRAMUSCULAR; INTRAVENOUS ONCE
Status: COMPLETED | OUTPATIENT
Start: 2025-04-30 | End: 2025-04-30

## 2025-04-30 RX ADMIN — Medication 12.5 MG: at 08:50

## 2025-04-30 RX ADMIN — ATORVASTATIN CALCIUM 10 MG: 10 TABLET, FILM COATED ORAL at 20:47

## 2025-04-30 RX ADMIN — INSULIN ASPART 1 UNITS: 100 INJECTION, SOLUTION INTRAVENOUS; SUBCUTANEOUS at 18:25

## 2025-04-30 RX ADMIN — FUROSEMIDE 40 MG: 10 INJECTION, SOLUTION INTRAMUSCULAR; INTRAVENOUS at 08:51

## 2025-04-30 RX ADMIN — POLYETHYLENE GLYCOL 3350 17 G: 17 POWDER, FOR SOLUTION ORAL at 07:37

## 2025-04-30 RX ADMIN — CEFAZOLIN SODIUM 2 G: 2 SOLUTION INTRAVENOUS at 13:46

## 2025-04-30 RX ADMIN — SENNOSIDES AND DOCUSATE SODIUM 1 TABLET: 50; 8.6 TABLET ORAL at 20:47

## 2025-04-30 RX ADMIN — HYDROMORPHONE HYDROCHLORIDE 0.4 MG: 0.2 INJECTION, SOLUTION INTRAMUSCULAR; INTRAVENOUS; SUBCUTANEOUS at 20:51

## 2025-04-30 RX ADMIN — OXYCODONE HYDROCHLORIDE 10 MG: 10 TABLET ORAL at 23:10

## 2025-04-30 RX ADMIN — INSULIN HUMAN 2 UNITS/HR: 1 INJECTION, SOLUTION INTRAVENOUS at 04:15

## 2025-04-30 RX ADMIN — HEPARIN SODIUM 5000 UNITS: 5000 INJECTION, SOLUTION INTRAVENOUS; SUBCUTANEOUS at 11:55

## 2025-04-30 RX ADMIN — ACETAMINOPHEN 975 MG: 325 TABLET ORAL at 04:02

## 2025-04-30 RX ADMIN — SODIUM CHLORIDE, SODIUM LACTATE, POTASSIUM CHLORIDE, AND CALCIUM CHLORIDE 1000 ML: .6; .31; .03; .02 INJECTION, SOLUTION INTRAVENOUS at 00:45

## 2025-04-30 RX ADMIN — Medication 250 MG: at 04:10

## 2025-04-30 RX ADMIN — OXYCODONE HYDROCHLORIDE 10 MG: 10 TABLET ORAL at 18:24

## 2025-04-30 RX ADMIN — INSULIN GLARGINE 10 UNITS: 100 INJECTION, SOLUTION SUBCUTANEOUS at 10:24

## 2025-04-30 RX ADMIN — MAGNESIUM OXIDE TAB 400 MG (241.3 MG ELEMENTAL MG) 400 MG: 400 (241.3 MG) TAB at 07:44

## 2025-04-30 RX ADMIN — METHOCARBAMOL 500 MG: 500 TABLET ORAL at 18:25

## 2025-04-30 RX ADMIN — HYDROMORPHONE HYDROCHLORIDE 0.2 MG: 0.2 INJECTION, SOLUTION INTRAMUSCULAR; INTRAVENOUS; SUBCUTANEOUS at 13:45

## 2025-04-30 RX ADMIN — METHOCARBAMOL 500 MG: 500 TABLET ORAL at 11:55

## 2025-04-30 RX ADMIN — SENNOSIDES AND DOCUSATE SODIUM 1 TABLET: 50; 8.6 TABLET ORAL at 07:37

## 2025-04-30 RX ADMIN — ACETAMINOPHEN 975 MG: 325 TABLET ORAL at 11:55

## 2025-04-30 RX ADMIN — ASPIRIN 81 MG CHEWABLE TABLET 162 MG: 81 TABLET CHEWABLE at 07:37

## 2025-04-30 RX ADMIN — PANTOPRAZOLE SODIUM 40 MG: 40 TABLET, DELAYED RELEASE ORAL at 07:37

## 2025-04-30 RX ADMIN — HEPARIN SODIUM 5000 UNITS: 5000 INJECTION, SOLUTION INTRAVENOUS; SUBCUTANEOUS at 20:50

## 2025-04-30 RX ADMIN — HYDROMORPHONE HYDROCHLORIDE 0.4 MG: 0.2 INJECTION, SOLUTION INTRAMUSCULAR; INTRAVENOUS; SUBCUTANEOUS at 01:14

## 2025-04-30 RX ADMIN — OXYCODONE HYDROCHLORIDE 10 MG: 10 TABLET ORAL at 04:10

## 2025-04-30 RX ADMIN — OXYCODONE HYDROCHLORIDE 10 MG: 10 TABLET ORAL at 13:45

## 2025-04-30 RX ADMIN — ACETAMINOPHEN 975 MG: 325 TABLET ORAL at 20:46

## 2025-04-30 RX ADMIN — OXYCODONE HYDROCHLORIDE 10 MG: 10 TABLET ORAL at 09:45

## 2025-04-30 RX ADMIN — INSULIN ASPART 3 UNITS: 100 INJECTION, SOLUTION INTRAVENOUS; SUBCUTANEOUS at 11:52

## 2025-04-30 RX ADMIN — HYDROMORPHONE HYDROCHLORIDE 0.4 MG: 0.2 INJECTION, SOLUTION INTRAMUSCULAR; INTRAVENOUS; SUBCUTANEOUS at 07:26

## 2025-04-30 RX ADMIN — CEFAZOLIN SODIUM 2 G: 2 SOLUTION INTRAVENOUS at 05:03

## 2025-04-30 RX ADMIN — MAGNESIUM OXIDE TAB 400 MG (241.3 MG ELEMENTAL MG) 400 MG: 400 (241.3 MG) TAB at 04:43

## 2025-04-30 RX ADMIN — METHOCARBAMOL 500 MG: 500 TABLET ORAL at 23:10

## 2025-04-30 ASSESSMENT — ACTIVITIES OF DAILY LIVING (ADL)
ADLS_ACUITY_SCORE: 50
ADLS_ACUITY_SCORE: 50
ADLS_ACUITY_SCORE: 49
ADLS_ACUITY_SCORE: 50
ADLS_ACUITY_SCORE: 49
ADLS_ACUITY_SCORE: 50
ADLS_ACUITY_SCORE: 49
ADLS_ACUITY_SCORE: 44
ADLS_ACUITY_SCORE: 50
ADLS_ACUITY_SCORE: 49
ADLS_ACUITY_SCORE: 49
ADLS_ACUITY_SCORE: 50
ADLS_ACUITY_SCORE: 50
ADLS_ACUITY_SCORE: 49
ADLS_ACUITY_SCORE: 50
ADLS_ACUITY_SCORE: 44
ADLS_ACUITY_SCORE: 44
ADLS_ACUITY_SCORE: 42
ADLS_ACUITY_SCORE: 50
ADLS_ACUITY_SCORE: 49
ADLS_ACUITY_SCORE: 44

## 2025-04-30 NOTE — PROGRESS NOTES
RT CV Post-op Extubation Plan    ETT Cuffed 8 mm-Secured at (cm): 21 cm cm at teeth/gums    Initial vent settings:  Vent Mode: (S) CMV   pressure with Pressure Support)  Resp Rate (Set): 14 breaths/min  Tidal Volume (Set, mL): 450 mL   FiO2: 40 %   PEEP (cm H2O): 5 cmH2O     Is patient fast track? Yes    Patient out of OR time: 1615  **Extubation goal is within 6 hours after leaving the OR**      Patient meets all criteria for wean as outlined in policy: Yes      Summary of weaning attempts in first 24 hours (Patients to be placed on CPAP/Pressure Support of 7/5):  **Weaning to be between 30 min and 60 min**  **Contact provider if there is a concern over extubation**    Start: 1800  End: 1945  Was patient extubated? Yes        RT comments:  Pt extubated to  2L NC.  Pt was suctioned via ETT and orally, and cuff leak checked prior to extubation.     Lung sounds are clear all lobes. Pt has a strong cough. No immediate complications occurred.     Jas Weeks, RT on 4/29/2025 at 10:17 PM

## 2025-04-30 NOTE — PROVIDER NOTIFICATION
Notified team regarding patient's rhythm - ST with very frequent PAC's per telemetry. Per Dr. William, notify team if patient's HR goes above 120 and sustains.

## 2025-04-30 NOTE — PROGRESS NOTES
CV ICU PROGRESS NOTE  Rohit William  [id number]  Admitted: 4/29/2025  5:07 AM      ASSESSMENT  Rohit William is a 70 year old male with PMH of multivessel CAD, dilated aortic root/ascending aorta, HTN, T2DM, obesity. Presents to [facility] for coronary artery bypass graft x 3, left endoscopic harvest of greater saphenous vein, left internal mammary harvest, intraoperative transesophageal echocardiogram by anesthesia by Dr. Barnes.       OVERNIGHT:  - Extubated to NC yesterday evening ~1930  - Lactate 2.5, CVP 3: 500 LR bolus given  - Lactate 3.9 at 0000, CVP 4: 1 L bolus given    PLAN  - Floor status  - Advance diet as tolerated  - Transition from insulin gtt to lantus 10 and HDSSI  - Start metoprolol 12.5 mg bid, atorvastatin 10 mg daily,  mg  - Lasix 40 mg x1  - Remove arterial line and MAC line   - Cap TPW     Neuro/ pain/ sedation:  - Monitor neurological status. Notify the MD for any acute changes in exam.     # Acute postoperative pain  - Scheduled: Tylenol and Robaxin  - PRN: Tylenol, oxycodone, Dilaudid     Pulmonary care:   # Post extubation respiratory insufficiency, improving  Extubated to NC 4/29.   - Goal SpO2 > 92%  - Encourage IS q15-30 minutes   - CXR daily while CT in place  - Monitor CT output       Cardiovascular:    # Cardiogenic shock, improved   # Hx multivessel CAD s/p coronary artery bypass graft x3  Pre CPBP: Normal EF. Normal right and left ventricular cavity size and function. 1+ regurgitation of mitral valve, tricuspid valve, and pulmonic valve. Ascending aorta dilated to 4.1 cm; dissection not presentation.   Post CPBP:  Unchanged biventricular function. No regional wall motion changes seen.   - VVI backup 50; cap TPW    - Monitor hemodynamics closely  - Goal MAP >65, SBP <140   - Off pressor support   - Hold PTA losartan-hydrochlorothiazide 100-25 mg  - Restart atorvastatin 10 mg   - Start metoprolol 12.5 mg BID    - Start  mg     GI care / Nutrition:   # At risk for protein  calorie malnutrition   - Advance diet as tolerated  - PPI  - Bowel regimen: MiraLAX, senna       Renal / Fluids / Electrolytes:   # Hypovolemia, improved   BL creat appears to be ~ 0.6-0.8. CVP 3-4 improved to 9.   - Strict I/O, daily weights, avoid/limit nephrotoxins  - Replete lytes PRN per protocol     Endocrine:    # Stress hyperglycemia  Preop A1c 8.6. PTA metformin  - Transition from insulin gtt to Lantus 10 and HDSSI  - Goal BG <180 for optimal healing     ID / Antibiotics:  # Stress induced leukocytosis, improving  - Completed perioperative regimen (ancef)  - Monitor fever curve, WBC, and inflammatory markers as appropriate     Heme:     # Acute blood loss anemia  # Acute thrombocytopenia  No s/sx active bleeding  - Trending CBC   - Hgb goal > 7.0  - Hemoglobin 9 (4/30)     MSK / Skin:  # Sternotomy    # Surgical Incision  - Sternal precautions, postop incision management protocol  - PT/OT/CR     Prophylaxis:     - Mechanical DVT ppx  - Chemical DVT ppx: SQH   - PPI     Lines / Tubes / Drains:  - RIJ CVC - remove   - Arterial Line (radial) - remove  - CTs meds x2, pleural x1  - Epstein - remove     Disposition:  - Floor status     Patient seen, findings and plan discussed with CV ICU staff    Mickie Schreiber MD      ====================================    TODAY'S PROGRESS  SUBJECTIVE  - Extubated yesterday evening.        OBJECTIVE  1. VITAL SIGNS  Temp:  [98  F (36.7  C)-99.7  F (37.6  C)] 99.1  F (37.3  C)  Pulse:  [] 100  Resp:  [20-28] 22  MAP:  [63 mmHg-245 mmHg] 70 mmHg  Arterial Line BP: ()/(50-76) 99/55  FiO2 (%):  [40 %] 40 %  SpO2:  [94 %-100 %] 97 %  FiO2 (%): 40 %, Resp: 22, Vent Mode: (S) CPAP/PS, Resp Rate (Set): 14 breaths/min, Tidal Volume (Set, mL): 450 mL, PEEP (cm H2O): 0 cmH2O, Pressure Support (cm H2O): 0 cmH2O, Resp Rate (Set): 14 breaths/min, Tidal Volume (Set, mL): 450 mL, PEEP (cm H2O): 0 cmH2O    2. INTAKE/ OUTPUT  I/O last 3 completed shifts:  In: 4892.61  [I.V.:1707.61; Other:485; IV Piggyback:2700]  Out: 1780 [Urine:1350; Chest Tube:430]    3. PHYSICAL EXAMINATION  Neuro: Awake, sitting up   HEENT: Normocephalic, atraumatic. PERRL, and nonicteric.   CV: RRR on monitor, S1S2, all extremities well perfused   Respiratory: CTAB, Normal respiratory effort on RA, equal chest rise b/l   GI: Abdomen soft and non-tender to light palpation. Non-distended   : Voiding with Epstein   MSK: Moves all extremities well with normal appearing strength. Sensation intact in all upper/lower extremities.     Skin: Normal color. No rashes or skin lesions.   Psych: Cooperative, congruent mood and affect.      4. INVESTIGATIONS  Arterial Blood Gases   Recent Labs   Lab 04/29/25  1842 04/29/25  1617 04/29/25  1445 04/29/25  1405   PH 7.37 7.32* 7.35 7.38   PCO2 38 42 39 39   PO2 141* 99 231* 293*   HCO3 22 21 21 23     Complete Blood Count   Recent Labs   Lab 04/30/25  0309 04/29/25  2213 04/29/25  1617 04/29/25  1445 04/29/25  1405 04/29/25  1356   WBC 15.8* 22.0* 25.8*  --   --  22.7*   HGB 9.0* 10.0* 11.2* 11.2*   < > 9.1*    222 245  --   --  193    < > = values in this interval not displayed.     Basic Metabolic Panel  Recent Labs   Lab 04/30/25  0658 04/30/25  0604 04/30/25  0457 04/30/25  0400 04/30/25  0309 04/30/25  0103 04/30/25  0002 04/29/25  1702 04/29/25  1617 04/29/25  1616 04/29/25  1445   NA  --   --   --   --  136  --  138  --  138  --  140   POTASSIUM  --   --   --   --  4.1  --  4.0  --  3.7  --  3.6   CHLORIDE  --   --   --   --  107  --  108*  --  107  --   --    CO2  --   --   --   --  22  --  19*  --  21*  --   --    BUN  --   --   --   --  16.8  --  17.5  --  17.5  --   --    CR  --   --   --   --  0.77  --  0.83  --  0.80  --   --    * 118* 117* 121* 155*   < > 205*   < > 191*   < > 190*    < > = values in this interval not displayed.     Liver Function Tests  Recent Labs   Lab 04/30/25  0309 04/30/25  0002 04/29/25  1617 04/29/25  1356   AST 38 43 43   "--    ALT 12 13 14  --    ALKPHOS 35* 38* 43  --    BILITOTAL 0.3 0.4 0.6  --    ALBUMIN 3.1* 3.2* 3.5  --    INR  --   --  1.30* 1.43*     Pancreatic Enzymes  No lab results found in last 7 days.  Coagulation Profile  Recent Labs   Lab 04/29/25  1617 04/29/25  1356   INR 1.30* 1.43*   PTT 35 29     Lactate  Invalid input(s): \"LACTATE\"    5. RADIOLOGY  Recent Results (from the past 24 hours)   TRAVON with Report    Narrative    Miguel Angel Morrison MD     4/29/2025  2:30 PM  Perioperative TRAVON Procedure Note    Staff -        Anesthesiologist:  Miguel Angel Morrison MD       Performed By: anesthesiologist  Preanesthesia Checklist:  Patient identified, IV assessed, risks and   benefits discussed, monitors and equipment assessed, procedure being   performed at surgeon's request and anesthesia consent obtained.    TRAVON Probe Insertion    Probe Status PRE Insertion: NO obvious damage  Probe type:  Adult 3D  Bite block used:   Soft  Insertion Technique: Easy, no oropharyngeal manipulation  Insertion complications: None obvious  Billing Report:TRAVON report by Anesthesiologist (See Separate Report note)  Probe Status POST Removal: NO obvious damage    TRAVON Report  General Procedure Information  Images for this study have been archived.  Modalities: 2D, 3D, CW Doppler, PW Doppler and Color flow mapping  Echocardiographic and Doppler Measurements  Right Ventricle:  Cavity size normal.     Thrombus not present.    Global   function normal.     Left Ventricle:  Cavity size normal.     Thrombus not present.   Global   Function normal.       Ventricular Regional Function:  1- Basal Anteroseptal:  normal  2- Basal Anterior:  normal  3- Basal Anterolateral:  normal  4- Basal Inferolateral:  normal  5- Basal Inferior:  normal  6- Basal Inferoseptal:  normal  7- Mid Anteroseptal:  normal  8- Mid Anterior:  normal  9- Mid Anterolateral:  normal  10- Mid Inferolateral:  normal  11- Mid Inferior:  normal  12- Mid Inferoseptal:  normal  13- Apical " Anterior:  normal  14- Apical Lateral:  normal  15- Apical Inferior:  normal  16- Apical Septal:  normal  17- [place]:  normal    Valves  Aortic Valve: Annulus normal.  Stenosis not present.  Regurgitation +1.    Leaflets thickened.    Mitral Valve: Stenosis not present.  Regurgitation +1.    Tricuspid Valve: Regurgitation +1.    Pulmonic Valve: Regurgitation +1.      Aorta: Ascending Aorta: Size dilated.  Diameter 4.1 cm.  Dissection not   present.  Plaque thickness less than 3 mm.  Mobile plaque not present.    Aortic Arch:   Dissection not present.   Plaque thickness less than 3 mm.     Mobile plaque not present.    Descending Aorta:   Dissection not present.   Plaque thickness less than 3   mm.   Mobile plaque not present.      Right Atrium:   Spontaneous echo contrast not present.   Thrombus not   present.   Tumor not present.   Device not present.     Left Atrium: Spontaneous echo contrast not present.  Thrombus not   present.  Tumor not present.  Device not present.    Left atrial appendage normal.     Atrial Septum: Intra-atrial septal morphology normal.         Diastolic Function Measurements:  Diastolic Dysfunction Grade= I.  E=  35.2 ms.  A=  53.7 ms.  E/A Ratio=    0.7.  DT=  ms.  S/D= .  IVRT= ms.  Other Findings:   Pericardium:  normal. Pleural Effusion:  none.  Pulmonary Venous Flow:    normal. Cornoary sinus catheter present.    Post Intervention Findings  Procedure(s) performed:  CABG.  Regional wall motion:. Surgeon(s) notified   of all postintervention findings: Yes (In real time).             Post Intervention comments: Unchanged biventricular function. No regional   wall motion changes seen  Unchanged valvular function.AI appeared worse immediately after separation   from bypass, improved subsequently  No clot in NEVA  No obvious dissection in ascending aorta  No pericardial or pleural effusions seen  Post CPB exam performed on epinephrine and norepinephrine drips.    Echocardiogram  Comments  Echocardiogram comments: Guidewire for central line seen in RA,   subsequently removed.  Mild Tricuspid regurgitation seen, mild pulmonic insufficiency seen. Mild   mitral regurgitation seen, jet between A3/P3  Trileaflet aortic valve with central AI jet seen.  Doppler data not   reliable for valve interrogation due to enlarged ascending aorta . Based   on VC of 0.259 cm and the width of the AI jet/LVOT ratio AI appears to be   mild(was trace on pre op echo)    .   XR Abdomen Port 1 View    Narrative    EXAMINATION:  XR ABDOMEN PORT 1 VIEW 4/29/2025 5:05 PM.    COMPARISON: Chest radiograph same day.    HISTORY:  OG placement    FINDINGS:   Portable AP view of the abdomen. Enteric tube tip is not visualized  within the abdomen. Review of the same day chest radiograph does not  include an enteric tube within the field of view. Nonspecific bowel  gas pattern. No definite pneumatosis or portal venous gas. No acute  osseous abnormality.      Impression    IMPRESSION:   Enteric tube tip is not visualized within the abdomen. Review of the  simultaneously obtained chest radiograph does not include an enteric  tube within the field of view. Tubing may be coiled within the  mouth/neck region.    I have personally reviewed the examination and initial interpretation  and I agree with the findings.    LEXY SALEH DO         SYSTEM ID:  [id number]   XR Chest Port 1 View    Narrative    Exam: XR CHEST PORT 1 VIEW, 4/29/2025 5:05 PM    Comparison: Radiograph 4/3/2025, 3/3/2025, CT 12/9/2024    History: Post Op CVTS Surgery    Findings:  Endotracheal tube tip projects over the mid thoracic trachea. Right IJ  central venous catheter tip projects over the upper SVC. Postsurgical  changes of the chest with median sternotomy wires, mediastinal drains,  and left basilar chest tube. Cardiomediastinal silhouette is mildly  enlarged. Low lung volumes with mild streaky perihilar and left  basilar opacities. Small left pleural  effusion. No pneumothorax.      Impression    Impression:   New postsurgical changes of the chest with endotracheal tube tip  projecting over the mid thoracic trachea. Low lung volumes with mild  streaky perihilar and left basilar opacities, likely  edema/atelectasis. Small left pleural effusion.    I have personally reviewed the examination and initial interpretation  and I agree with the findings.    LEXY SALEH, DO         SYSTEM ID:  [id number]   XR Chest Port 1 View    Impression    RESIDENT PRELIMINARY INTERPRETATION  IMPRESSION:  1. Interval removal of endotracheal tube with otherwise stable support  devices.  2. Improved perihilar and retrocardiac opacities, may be secondary to  edema/atelectasis. Recommend follow-up to clearing.  3. Small left pleural effusion.

## 2025-04-30 NOTE — PROGRESS NOTES
"   04/30/25 1020   Appointment Info   Signing Clinician's Name / Credentials (OT) Amy Hess, OTR/L   Rehab Comments (OT) sternal precautions, monitor BP   Living Environment   People in Home spouse   Current Living Arrangements house   Home Accessibility stairs to enter home;stairs within home   Number of Stairs, Main Entrance 2   Number of Stairs, Within Home, Primary greater than 10 stairs  (13)   Stair Railings, Within Home, Primary railings on both sides of stairs   Transportation Anticipated family or friend will provide   Living Environment Comments Pt lives with spouse in 2 level house. Has 2 VERENICE. 13 stairs to upper level with mitesh handrails. Has walk-in shower w/ grab bars, no chair   Self-Care   Usual Activity Tolerance good   Current Activity Tolerance fair   Regular Exercise No   Fall history within last six months no   Activity/Exercise/Self-Care Comment IND in ADLs prior to admission   Instrumental Activities of Daily Living (IADL)   IADL Comments Pt spouse assists with IADLs, owns 14 horses, dtrs live across street/next door to assist prn, owns own company- hrs worked variable   General Information   Onset of Illness/Injury or Date of Surgery 04/29/25   Referring Physician Ruslan Mccoy MD   Patient/Family Therapy Goal Statement (OT) Return to PLOF and home   Additional Occupational Profile Info/Pertinent History of Current Problem Per EMR, \"70 year old male with PMH of multivessel CAD, dilated aortic root/ascending aorta, HTN, T2DM, obesity. Presents to [facility] for coronary artery bypass graft x 3, left endoscopic harvest of greater saphenous vein, left internal mammary harvest, intraoperative transesophageal echocardiogram by anesthesia by Dr. Barnes.\"   Existing Precautions/Restrictions cardiac;fall   Left Upper Extremity (Weight-bearing Status) partial weight-bearing (PWB)  (<10 lbs)   Right Upper Extremity (Weight-bearing Status) partial weight-bearing (PWB)  (<10 lbs)   General Observations and " Info Activity: Adult ICU Early Mobility   Cognitive Status Examination   Orientation Status orientation to person, place and time   Affect/Mental Status (Cognitive) low arousal/lethargic   Follows Commands WFL;delayed response/completion   Cognitive Status Comments Pt lethargic, A&Ox3, follows commands appropriately inc time d/t lethargy   Visual Perception   Visual Impairment/Limitations WFL;corrective lenses for reading   Sensory   Sensory Comments n/t in L digits 3-5   Pain Assessment   Patient Currently in Pain Yes, see Vital Sign flowsheet   Posture   Posture forward head position;protracted shoulders   Range of Motion Comprehensive   General Range of Motion bilateral upper extremity ROM WFL   Comment, General Range of Motion limited d/t precautions   Strength Comprehensive (MMT)   Comment, General Manual Muscle Testing (MMT) Assessment no formal assessment d/t precautions, WFL, general weakness   Coordination   Coordination Comments mild mitesh hand swelling this date, anticipate coord WFL   Transfers   Transfers sit-stand transfer   Sit-Stand Transfer   Sit-Stand [place] (Transfers) minimum assist (75% patient effort)   Balance   Balance Assessment standing dynamic balance   Standing Balance: Dynamic minimal assist;contact guard   Activities of Daily Living   BADL Assessment/Intervention bathing;lower body dressing;grooming;toileting   Bathing Assessment/Intervention   [place] Level (Bathing) moderate assist (50% patient effort)   Comment, (Bathing) per clinical judgement   Lower Body Dressing Assessment/Training   Comment, (Lower Body Dressing) per clinical judgement   [place] Level (Lower Body Dressing) maximum assist (25% patient effort)   Grooming Assessment/Training   [place] Level (Grooming) supervision;set up   Comment, (Grooming) per clinical judgement   Toileting   Comment, (Toileting) per clinical judgement   [place] Level (Toileting) maximum assist (25% patient effort)    Clinical Impression   Criteria for Skilled Therapeutic Interventions Met (OT) Yes, treatment indicated   OT Diagnosis Dec IND in I/ADLs   OT Problem List-Impairments impacting ADL problems related to;activity tolerance impaired;cognition;mobility;range of motion (ROM);sensation;strength;pain;post-surgical precautions   Assessment of Occupational Performance 3-5 Performance Deficits   Identified Performance Deficits dressing, bathing, toileting, functional transfers and mobility   Planned Therapy Interventions (OT) ADL retraining;IADL retraining;cognition;bed mobility training;ROM;strengthening;transfer training;progressive activity/exercise   Clinical Decision Making Complexity (OT) problem focused assessment/low complexity   Risk & Benefits of therapy have been explained evaluation/treatment results reviewed;care plan/treatment goals reviewed;risks/benefits reviewed;current/potential barriers reviewed;participants voiced agreement with care plan;participants included;patient   Clinical Impression Comments Pt appears below functional baseline. Would benefit from continued OT services to promote strength and IND in I/ADLs   OT Total Evaluation Time   OT Eval, Low Complexity Minutes (03896) 8   OT Goals   Therapy Frequency (OT) 6 times/week   OT Predicted Duration/Target Date for Goal Attainment 05/14/25   OT Goals Hygiene/Grooming;Lower Body Dressing;Lower Body Bathing;Transfers;Toilet Transfer/Toileting;Cognition;Home Management;Cardiac Phase 1   OT: Hygiene/Grooming modified independent;within precautions   OT: Lower Body Dressing Modified independent;within precautions   OT: Lower Body Bathing Modified independent;with precautions   OT: Transfer Modified independent;within precautions  (shower transfer)   OT: Toilet Transfer/Toileting Modified independent;within precautions   OT: Home Management Modified independent;with light demand household tasks;within precautions   OT: Cognitive Patient/caregiver will  verbalize understanding of cognitive assessment results/recommendations as needed for safe discharge planning   OT: Understanding of cardiac education to maximize quality of life, condition management, and health outcomes Patient;Verbalize   OT: Perform aerobic activity with stable cardiovascular response continuous;15 minutes;ambulation   OT: Functional/aerobic ambulation tolerance with stable cardiovascular response in order to return to home and community environment Modified independent;Greater than 300 feet   OT: Navigation of stairs simulating home set up with stable cardiovascular response in order to return to home and community environment Modified independent;Greater than 10 stairs  (13)   OT Discharge Planning   OT Plan Review precautions, progress functional transfers/mobility screen PT needs, monitor cog, standing ADLs w/ chair behind as able   OT Discharge Recommendation (DC Rec) home with assist;home with outpatient cardiac rehab   OT Rationale for DC Rec Pt below functional baseline. Limited by pain, lethargy, fatigue, and post op precautions. Anticipate with progress and LOS, pt will be safe for return home with family assist and OP CR to promote strength and act eh for I/ADLs   OT Brief overview of current status Carlene STS   Total Session Time   Timed Code Treatment Minutes 17   Total Session Time (sum of timed and untimed services) 25

## 2025-04-30 NOTE — PLAN OF CARE
Major Shift Events: (Priority shift concerns: BP monitoring/management, blood glucose monitoring/management, chest tube monitoring, pain control).    Patient's status improving throughout duration of shift.    Patient extubated @ 1945 (04/29) ? 2L NC. Extubation unremarkable and patient demonstrates respiratory stability. Patient neurologically intact. Passed bedside swallow.    Titrating epinephrine/norepinephrine gtts to maintain MAP goals. Epi/Levo turned off; patient tolerating. Fluid boluses given for ? lactic acid (1.5 liters LR total). Chest tube remains to wall suction, per order.    Patient remains on insulin gtt. Blood glucose levels improving.    Patient c/o pain throughout shift. PRNs given ? pain ?.    See flowsheets for detailed assessment findings.    Plan: Continue promoting patient safety, comfort, and wellbeing. Continue monitoring BP and ensure MAP goals met (pressors currently off). Continue monitoring blood glucose levels and titrate insulin gtt per algorithm. Continue management of post-op pain. Trend/interpret lab values as ordered. Continue monitoring of chest tube status/patient's I/O. Promote mobility and encourage early ambulation as appropriate/tolerated.    For vital signs and complete assessments, please see documentation flowsheets.        Goal Outcome Evaluation:      Plan of Care Reviewed With: patient    Overall Patient Progress: improvingOverall Patient Progress: improving

## 2025-04-30 NOTE — PLAN OF CARE
Transfer    Transferred from: 4A  Via: WC  Reason for transfer: Appropriate for 6C  Family: Aware of transfer  Belongings: Sent with pt  Chart: Sent with pt  Medications: Sent with pt  Report called from: COREY Hendricks  Skin check completed by: Writer and COREY Velásquez

## 2025-04-30 NOTE — PHARMACY-ADMISSION MEDICATION HISTORY
Pharmacist Admission Medication History    Admission medication history is complete. The information provided in this note is only as accurate as the sources available at the time of the update.    Information Source(s): Patient and CareEverywhere/SureScripts via in-person    Pertinent Information: Of note, patient takes an 80 mg and 10 mg tablet for a total of 90 mg atorvastatin daily.     Changes made to PTA medication list:  Added: None  Deleted: None  Changed: None    Allergies reviewed with patient and updates made in EHR: yes    Medication History Completed By: Erwin Benavidez RPH 4/30/2025 1:18 PM    PTA Med List   Medication Sig Note Last Dose/Taking    aspirin (ASA) 81 MG tablet Take 81 mg by mouth every morning. 4/23/2025: Last dose to be taken on 4/27/25 4/28/2025 at 10:00 AM    atorvastatin (LIPITOR) 10 MG tablet Take 1 tablet (10 mg) by mouth daily or cholesterol/prevents hearts attacks/strokes (Patient taking differently: Take 10 mg by mouth every morning. or cholesterol/prevents hearts attacks/strokes)  4/29/2025 at  4:00 AM    atorvastatin (LIPITOR) 80 MG tablet Take 1 tablet (80 mg) by mouth daily. (Patient taking differently: Take 80 mg by mouth every morning.)  4/29/2025 at  4:00 AM    fish oil-omega-3 fatty acids 1000 MG capsule Take 2 g by mouth daily. 4/23/2025: Hold 7 days prior to procedure, 4/29/25 Past Month    losartan-hydrochlorothiazide (HYZAAR) 100-25 MG tablet Take 1 tablet by mouth daily. For blood pressure in AM. This is a combination pill of cozaar and hydrochlorothiazide. Finish individual pills first (Patient taking differently: Take 1 tablet by mouth every morning. For blood pressure in AM. This is a combination pill of cozaar and hydrochlorothiazide. Finish individual pills first) 4/23/2025: Hold two days prior to 4/29/25 procedure.  Last dose to be taken on 4/26/25 Past Week    magnesium oxide (MAG-OX) 400 MG tablet Take 400 mg by mouth daily. 4/23/2025: Hold DOS Past Month     metFORMIN (GLUCOPHAGE XR) 500 MG 24 hr tablet TAKE 1 TAB  BY MOUTH DAILY (WITH BREAKFAST) AND 1 TAB DAILY (WITH DINNER) 4/23/2025: Hold DOS 4/28/2025 at  6:00 PM    sitagliptin (JANUVIA) 25 MG tablet Take 1 tablet (25 mg) by mouth daily. (Patient taking differently: Take 25 mg by mouth every morning.) 4/23/2025: Hold DOS Past Week    zinc gluconate 50 MG tablet Take 50 mg by mouth daily. 4/23/2025: Hold DOS Past Month

## 2025-04-30 NOTE — PROGRESS NOTES
"CLINICAL NUTRITION SERVICES - BRIEF NOTE    Received provider consult for nutrition education with comments post op cardiovascular surgery (automatic consult on post-op order set). S/p CABGx3 on 4/29. Nutrition education to be completed as able/appropriate (as pt s/p CABG and/or initial VAD).    RD will follow per LOS protocol or if re-consulted.     Karina Hernandez, MS, RD, LD  Available on [facility] - \"4A Clinical Dietitian\"  Weekend/Holiday RD - \"Weekend Clinical Dietitian\"  "

## 2025-05-01 ENCOUNTER — APPOINTMENT (OUTPATIENT)
Dept: OCCUPATIONAL THERAPY | Facility: CLINIC | Age: 70
DRG: 235 | End: 2025-05-01
Attending: THORACIC SURGERY (CARDIOTHORACIC VASCULAR SURGERY)
Payer: COMMERCIAL

## 2025-05-01 ENCOUNTER — APPOINTMENT (OUTPATIENT)
Dept: CARDIOLOGY | Facility: CLINIC | Age: 70
DRG: 235 | End: 2025-05-01
Attending: NURSE PRACTITIONER
Payer: COMMERCIAL

## 2025-05-01 LAB
ANION GAP SERPL CALCULATED.3IONS-SCNC: 7 MMOL/L (ref 7–15)
ATRIAL RATE - MUSE: 121 BPM
BUN SERPL-MCNC: 16.1 MG/DL (ref 8–23)
CA-I BLD-MCNC: 4.6 MG/DL (ref 4.4–5.2)
CALCIUM SERPL-MCNC: 8.3 MG/DL (ref 8.8–10.4)
CHLORIDE SERPL-SCNC: 103 MMOL/L (ref 98–107)
CREAT SERPL-MCNC: 0.84 MG/DL (ref 0.67–1.17)
DIASTOLIC BLOOD PRESSURE - MUSE: NORMAL MMHG
EGFRCR SERPLBLD CKD-EPI 2021: >90 ML/MIN/1.73M2
ERYTHROCYTE [DISTWIDTH] IN BLOOD BY AUTOMATED COUNT: 15 % (ref 10–15)
GLUCOSE BLDC GLUCOMTR-MCNC: 167 MG/DL (ref 70–99)
GLUCOSE BLDC GLUCOMTR-MCNC: 173 MG/DL (ref 70–99)
GLUCOSE BLDC GLUCOMTR-MCNC: 179 MG/DL (ref 70–99)
GLUCOSE BLDC GLUCOMTR-MCNC: 182 MG/DL (ref 70–99)
GLUCOSE BLDC GLUCOMTR-MCNC: 183 MG/DL (ref 70–99)
GLUCOSE BLDC GLUCOMTR-MCNC: 188 MG/DL (ref 70–99)
GLUCOSE SERPL-MCNC: 191 MG/DL (ref 70–99)
HCO3 SERPL-SCNC: 25 MMOL/L (ref 22–29)
HCT VFR BLD AUTO: 24.2 % (ref 40–53)
HGB BLD-MCNC: 8.2 G/DL (ref 13.3–17.7)
INTERPRETATION ECG - MUSE: NORMAL
LVEF ECHO: NORMAL
MAGNESIUM SERPL-MCNC: 2.1 MG/DL (ref 1.7–2.3)
MCH RBC QN AUTO: 29.9 PG (ref 26.5–33)
MCHC RBC AUTO-ENTMCNC: 33.9 G/DL (ref 31.5–36.5)
MCV RBC AUTO: 88 FL (ref 78–100)
P AXIS - MUSE: 69 DEGREES
PHOSPHATE SERPL-MCNC: 2.9 MG/DL (ref 2.5–4.5)
PLATELET # BLD AUTO: 146 10E3/UL (ref 150–450)
POTASSIUM SERPL-SCNC: 4.6 MMOL/L (ref 3.4–5.3)
PR INTERVAL - MUSE: 146 MS
QRS DURATION - MUSE: 76 MS
QT - MUSE: 312 MS
QTC - MUSE: 443 MS
R AXIS - MUSE: -5 DEGREES
RBC # BLD AUTO: 2.74 10E6/UL (ref 4.4–5.9)
SODIUM SERPL-SCNC: 135 MMOL/L (ref 135–145)
SYSTOLIC BLOOD PRESSURE - MUSE: NORMAL MMHG
T AXIS - MUSE: -1 DEGREES
VENTRICULAR RATE- MUSE: 121 BPM
WBC # BLD AUTO: 15.5 10E3/UL (ref 4–11)

## 2025-05-01 PROCEDURE — 85041 AUTOMATED RBC COUNT: CPT | Performed by: THORACIC SURGERY (CARDIOTHORACIC VASCULAR SURGERY)

## 2025-05-01 PROCEDURE — 83735 ASSAY OF MAGNESIUM: CPT | Performed by: THORACIC SURGERY (CARDIOTHORACIC VASCULAR SURGERY)

## 2025-05-01 PROCEDURE — 250N000011 HC RX IP 250 OP 636: Performed by: SURGERY

## 2025-05-01 PROCEDURE — 93005 ELECTROCARDIOGRAM TRACING: CPT

## 2025-05-01 PROCEDURE — 93325 DOPPLER ECHO COLOR FLOW MAPG: CPT

## 2025-05-01 PROCEDURE — 250N000013 HC RX MED GY IP 250 OP 250 PS 637: Performed by: NURSE PRACTITIONER

## 2025-05-01 PROCEDURE — 84100 ASSAY OF PHOSPHORUS: CPT | Performed by: THORACIC SURGERY (CARDIOTHORACIC VASCULAR SURGERY)

## 2025-05-01 PROCEDURE — 97535 SELF CARE MNGMENT TRAINING: CPT | Mod: GO | Performed by: OCCUPATIONAL THERAPIST

## 2025-05-01 PROCEDURE — 93308 TTE F-UP OR LMTD: CPT | Mod: 26 | Performed by: INTERNAL MEDICINE

## 2025-05-01 PROCEDURE — 255N000002 HC RX 255 OP 636: Performed by: INTERNAL MEDICINE

## 2025-05-01 PROCEDURE — 250N000013 HC RX MED GY IP 250 OP 250 PS 637: Performed by: PHYSICIAN ASSISTANT

## 2025-05-01 PROCEDURE — 93010 ELECTROCARDIOGRAM REPORT: CPT | Performed by: INTERNAL MEDICINE

## 2025-05-01 PROCEDURE — 93325 DOPPLER ECHO COLOR FLOW MAPG: CPT | Mod: 26 | Performed by: INTERNAL MEDICINE

## 2025-05-01 PROCEDURE — 250N000011 HC RX IP 250 OP 636: Performed by: NURSE PRACTITIONER

## 2025-05-01 PROCEDURE — 250N000013 HC RX MED GY IP 250 OP 250 PS 637: Performed by: SURGERY

## 2025-05-01 PROCEDURE — 93321 DOPPLER ECHO F-UP/LMTD STD: CPT | Mod: 26 | Performed by: INTERNAL MEDICINE

## 2025-05-01 PROCEDURE — 82330 ASSAY OF CALCIUM: CPT | Performed by: SURGERY

## 2025-05-01 PROCEDURE — 36415 COLL VENOUS BLD VENIPUNCTURE: CPT | Performed by: SURGERY

## 2025-05-01 PROCEDURE — 120N000005 HC R&B MS OVERFLOW UMMC

## 2025-05-01 PROCEDURE — 250N000013 HC RX MED GY IP 250 OP 250 PS 637

## 2025-05-01 PROCEDURE — 82565 ASSAY OF CREATININE: CPT | Performed by: THORACIC SURGERY (CARDIOTHORACIC VASCULAR SURGERY)

## 2025-05-01 RX ORDER — RAMELTEON 8 MG/1
8 TABLET ORAL AT BEDTIME
Status: DISCONTINUED | OUTPATIENT
Start: 2025-05-01 | End: 2025-05-03

## 2025-05-01 RX ORDER — FUROSEMIDE 10 MG/ML
20 INJECTION INTRAMUSCULAR; INTRAVENOUS ONCE
Status: COMPLETED | OUTPATIENT
Start: 2025-05-01 | End: 2025-05-01

## 2025-05-01 RX ORDER — AMOXICILLIN 250 MG
2 CAPSULE ORAL 2 TIMES DAILY
Status: DISCONTINUED | OUTPATIENT
Start: 2025-05-01 | End: 2025-05-07 | Stop reason: HOSPADM

## 2025-05-01 RX ORDER — METOPROLOL TARTRATE 25 MG/1
25 TABLET, FILM COATED ORAL 2 TIMES DAILY
Status: DISCONTINUED | OUTPATIENT
Start: 2025-05-01 | End: 2025-05-03

## 2025-05-01 RX ORDER — FUROSEMIDE 10 MG/ML
40 INJECTION INTRAMUSCULAR; INTRAVENOUS ONCE
Status: COMPLETED | OUTPATIENT
Start: 2025-05-01 | End: 2025-05-01

## 2025-05-01 RX ADMIN — METHOCARBAMOL 500 MG: 500 TABLET ORAL at 15:58

## 2025-05-01 RX ADMIN — ATORVASTATIN CALCIUM 10 MG: 10 TABLET, FILM COATED ORAL at 20:23

## 2025-05-01 RX ADMIN — MAGNESIUM HYDROXIDE 30 ML: 400 SUSPENSION ORAL at 18:30

## 2025-05-01 RX ADMIN — POLYETHYLENE GLYCOL 3350 17 G: 17 POWDER, FOR SOLUTION ORAL at 09:21

## 2025-05-01 RX ADMIN — ACETAMINOPHEN 975 MG: 325 TABLET ORAL at 20:23

## 2025-05-01 RX ADMIN — METHOCARBAMOL 500 MG: 500 TABLET ORAL at 20:24

## 2025-05-01 RX ADMIN — METOPROLOL TARTRATE 25 MG: 25 TABLET, FILM COATED ORAL at 20:22

## 2025-05-01 RX ADMIN — OXYCODONE HYDROCHLORIDE 10 MG: 10 TABLET ORAL at 04:49

## 2025-05-01 RX ADMIN — INSULIN GLARGINE 10 UNITS: 100 INJECTION, SOLUTION SUBCUTANEOUS at 09:30

## 2025-05-01 RX ADMIN — ACETAMINOPHEN 975 MG: 325 TABLET ORAL at 12:08

## 2025-05-01 RX ADMIN — INSULIN ASPART 2 UNITS: 100 INJECTION, SOLUTION INTRAVENOUS; SUBCUTANEOUS at 13:05

## 2025-05-01 RX ADMIN — SENNOSIDES AND DOCUSATE SODIUM 2 TABLET: 50; 8.6 TABLET ORAL at 20:32

## 2025-05-01 RX ADMIN — INSULIN ASPART 2 UNITS: 100 INJECTION, SOLUTION INTRAVENOUS; SUBCUTANEOUS at 09:17

## 2025-05-01 RX ADMIN — HUMAN ALBUMIN MICROSPHERES AND PERFLUTREN 6 ML: 10; .22 INJECTION, SOLUTION INTRAVENOUS at 14:38

## 2025-05-01 RX ADMIN — ASPIRIN 81 MG CHEWABLE TABLET 162 MG: 81 TABLET CHEWABLE at 09:29

## 2025-05-01 RX ADMIN — METHOCARBAMOL 500 MG: 500 TABLET ORAL at 09:19

## 2025-05-01 RX ADMIN — OXYCODONE HYDROCHLORIDE 5 MG: 5 TABLET ORAL at 20:40

## 2025-05-01 RX ADMIN — METHOCARBAMOL 500 MG: 500 TABLET ORAL at 12:07

## 2025-05-01 RX ADMIN — HEPARIN SODIUM 5000 UNITS: 5000 INJECTION, SOLUTION INTRAVENOUS; SUBCUTANEOUS at 12:08

## 2025-05-01 RX ADMIN — Medication 12.5 MG: at 09:30

## 2025-05-01 RX ADMIN — HEPARIN SODIUM 5000 UNITS: 5000 INJECTION, SOLUTION INTRAVENOUS; SUBCUTANEOUS at 20:25

## 2025-05-01 RX ADMIN — OXYCODONE HYDROCHLORIDE 10 MG: 10 TABLET ORAL at 09:19

## 2025-05-01 RX ADMIN — HEPARIN SODIUM 5000 UNITS: 5000 INJECTION, SOLUTION INTRAVENOUS; SUBCUTANEOUS at 04:53

## 2025-05-01 RX ADMIN — FUROSEMIDE 40 MG: 10 INJECTION, SOLUTION INTRAMUSCULAR; INTRAVENOUS at 15:58

## 2025-05-01 RX ADMIN — ACETAMINOPHEN 975 MG: 325 TABLET ORAL at 04:50

## 2025-05-01 RX ADMIN — FUROSEMIDE 20 MG: 10 INJECTION, SOLUTION INTRAMUSCULAR; INTRAVENOUS at 09:21

## 2025-05-01 RX ADMIN — PANTOPRAZOLE SODIUM 40 MG: 40 TABLET, DELAYED RELEASE ORAL at 09:19

## 2025-05-01 ASSESSMENT — ACTIVITIES OF DAILY LIVING (ADL)
ADLS_ACUITY_SCORE: 42
ADLS_ACUITY_SCORE: 43
ADLS_ACUITY_SCORE: 42
ADLS_ACUITY_SCORE: 47
ADLS_ACUITY_SCORE: 42
ADLS_ACUITY_SCORE: 47
ADLS_ACUITY_SCORE: 43
ADLS_ACUITY_SCORE: 42
DEPENDENT_IADLS:: INDEPENDENT
ADLS_ACUITY_SCORE: 47
ADLS_ACUITY_SCORE: 42

## 2025-05-01 NOTE — PROVIDER NOTIFICATION
"/74 (BP Location: Left arm, Cuff Size: Adult Regular)   Pulse 115   Temp 98  F (36.7  C) (Oral)   Resp 20   Ht 1.854 m (6' 1\")   Wt 106.1 kg (233 lb 14.4 oz)   SpO2 97%   BMI 30.86 kg/m    May 1, 2025  2:35 AM    Provider notified: Lambert William MD  Reason: Sustained 's-130's for 30-45 mins while patient sitting up at bedside. Denies lightheadedness, dizziness, chest pain, or SOB. 's-110's after rest.   Orders/outcome: Provider aware. No new orders at this time.  "

## 2025-05-01 NOTE — PROGRESS NOTES
Cardiovascular Surgery Progress Note  05/01/2025         Assessment and Plan:     Rohit William is a 70 year old male with PMH of multivessel CAD, dilated aortic root/ascending aorta, HTN, T2DM, obesity. Presents to [facility] for coronary artery bypass graft x 3, left endoscopic harvest of greater saphenous vein, left internal mammary harvest, intraoperative transesophageal echocardiogram by anesthesia by Dr. Barnes on 4/29/25.     Cardiovascular:   # Cardiogenic shock, resolved   # Hx multivessel CAD s/p coronary artery bypass graft x3  HD stable, ST with -120. MAPS 70s-90s  Post op ECHO 5/1/25: EF 60 to 65%, normal IVC, no pericardial effusion  -  mg daily  - Atorvastatin 10 mg daily   - Start Metoprolol 12.5 mg BID   - Diuresis as below  - Post op echo obtained due to sinus tachycardia     Chest tubes: Removed in ICU   TPW: Removed in ICU     Pulmonary:  # Post extubation respiratory insufficiency, improving   # Pulmonary edema in the setting of hypervolemia   Extubated POD 0 to NC. Now saturating well on 2 lpm.   - Supplemental O2 PRN to keep sats > 92%. Wean off as tolerated.  - Pulm hygiene, IS, activity and deep breathing  - Diuresis as below     Neurology / MSK:  # Acute post-operative pain   Pain well controlled with current regimen:  - Acetaminophen, PO oxycodone PRN, methocarbamol     / Renal / Fluid / Electrolytes:  # Hypervolemia   BL creat ~ 0.8, most recent creatinine 0.84; adequate UOP.   FLUID STATUS: Pre-op weight 220 lbs, weight today 233 lbs; I/O past 24 hours: +1.6 L. CXR shows pulmonary edema   - Diuresis: 20 mg IV lasix this AM poor response, re-dose 40 mg IV lasix this afternoon. Goal net negative 1-2 L.  8.6  - Replete lytes per protocol  - Strict I/O, daily weights  - Avoid/limit nephrotoxins as able    GI / Nutrition:   - Tolerating regular diet  - Continue bowel regimen, last BM pre-op  - PPI   - Continue scheduled bowel regimen, discussed with bedside RN to give as needed  MOM this afternoon    Endocrine:  Stress induced hyperglycemia   Uncontrolled type II DM   Hgb A1c 8.6  - Initially managed on insulin drip postop, transitioned to sliding scale; goal BG <180 for optimal healing  - Hold PTA metformin     Infectious Disease:  Stress induced leukocytosis  WBC 15.5 (down trending), remains afebrile, no signs or symptoms of infection  - Completed perioperative antibiotics  - Continue to monitor fever curve, CBC    Hematology:   Acute blood loss anemia and thrombocytopenia  Hgb 8.2; Plt 146, no signs or symptoms of active bleeding  - CBC daily    Anticoagulation:   -  mg     MSK/Skin:  Sternotomy  Surgical incision  - Sternal precautions  - Incisional cares per protocol    Prophylaxis:   - Stress ulcer prophylaxis: Pantoprazole 40 mg daily for 30 days  - DVT prophylaxis: Subcutaneous heparin, SCD    Disposition:   - Transferred to  on 5/1/25  - Therapies recommending discharge to ARU    Discussed with Dr. Molly Bell DNP APRN CNP CCRN   Cardiovascular Surgery   Available on [employer] or Secure Chat   Pager [phone]          Interval History:     No overnight events.  Does not feel he has edema.   States pain is well managed on current regimen. Slept well overnight.  Tolerating diet and tube feeds, is passing flatus, + BM. No nausea or vomiting.  Breathing well without complaints. Reports being able to cough up sputum.   Working with therapies and ambulating in halls without assistance.   Denies chest pain, palpitations, dizziness, syncopal symptoms, fevers, chills, myalgias, or sternal popping/clicking.         Physical Exam:     Gen: A&Ox4, NAD  Neuro: no focal deficits   CV: RRR, normal S1 S2, no murmurs, rubs or gallops.   Pulm: CTA, no wheezing or rhonchi, normal breathing on RA  Abd: nondistended, normal BS, soft, nontender  Ext: 2+ peripheral edema  Incision: clean, dry, intact, no erythema, sternum stable  Tubes/drain sites: dressing clean and dry.            Data:    Imaging:  reviewed recent imaging, no acute concerns    Recent Results (from the past 24 hours)   Echocardiogram Limited   Result Value    LVEF  60-65%    Narrative    [id number]  [id number]  [id number]  [id number]^JOVITA^LB^CATARINA     [facility],[place]  Echocardiography Laboratory  [address] [place] [id number]     Name: SOFIE FERNANDES  MRN: [id number]  : 1955  Study Date: 2025 02:02 PM  Age: 70 yrs  Gender: Male  [place] [facility] [place] For Study: Tachycardia  Ordering Physician: LB HUSSEIN  Performed By: Kat Zhang     BSA: 2.3 m2  Height: 73 in  Weight: 233 lb  HR: 111  ______________________________________________________________________________  Procedure  Limited Echocardiogram with two-dimensional, color and spectral Doppler.  Contrast Optison. Optison (NDC #5623-5711-69) given intravenously. Patient was  given 6 ml mixture of 3 ml Optison and 6 ml saline. 3 ml wasted.  ______________________________________________________________________________  Interpretation Summary  Technically difficult study.     Left ventricular size, wall motion and function are normal. The ejection  fraction is 60-65%.     Pulmonary artery systolic pressure cannot be assessed.     The inferior vena cava is normal.     No pericardial effusion is present.     Compared to prior imaging the aortic root dilatin is not as well appreciated.  ______________________________________________________________________________  Left Ventricle  Left ventricular size, wall motion and function are normal. The ejection  fraction is 60-65%. Mild concentric wall thickening consistent with left  ventricular hypertrophy is present.     Right Ventricle  The right ventricle is normal size. Global right ventricular function is  normal.     Atria  The atria cannot be assessed. The atrial septum is intact as assessed by color  Doppler .     Mitral Valve  The mitral valve is normal.      Aortic Valve  Aortic valve is normal in structure and function.     Tricuspid Valve  The tricuspid valve is normal. The peak velocity of the tricuspid regurgitant  jet is not obtainable. Pulmonary artery systolic pressure cannot be assessed.     Pulmonic Valve  The valve leaflets are not well visualized.     Vessels  The inferior vena cava is normal. The aorta is not well seen and can not be  measured. IVC diameter <2.1 cm collapsing >50% with sniff suggests a normal RA  pressure of 3 mmHg.     Pericardium  No pericardial effusion is present.     Miscellaneous  Technically difficult study.     Compared to Previous Study  Compared to prior imaging the aortic root dilatin is not as well appreciated.  ______________________________________________________________________________  Report approved by: Fish Salcido MD on 05/01/2025 03:01 PM     ______________________________________________________________________________           Labs:  Recent Labs   Lab 05/01/25  1300 05/01/25  0859 05/01/25  0718 05/01/25  0651 05/01/25  0650 04/30/25  0754 04/30/25  0659 04/30/25  0400 04/30/25  0309 04/30/25  0103 04/30/25  0002 04/29/25  2303 04/29/25  2213 04/29/25  1702 04/29/25  1617 04/29/25  1405 04/29/25  1356   WBC  --   --   --  15.5*  --   --   --   --  15.8*  --   --   --  22.0*  --  25.8*  --  22.7*   HGB  --   --   --  8.2*  --   --  8.6*  --  9.0*  --   --   --  10.0*  --  11.2*   < > 9.1*   MCV  --   --   --  88  --   --   --   --  86  --   --   --  86  --  87  --  87   PLT  --   --   --  146*  --   --   --   --  172  --   --   --  222  --  245  --  193   INR  --   --   --   --   --   --   --   --   --   --   --   --   --   --  1.30*  --  1.43*   NA  --   --   --   --  135  --   --   --  136  --  138  --   --   --  138   < >  --    POTASSIUM  --   --   --   --  4.6  --   --   --  4.1  --  4.0  --   --   --  3.7   < >  --    CHLORIDE  --   --   --   --  103  --   --   --  107  --  108*  --   --   --  107   < >  --     CO2  --   --   --   --  25  --   --   --  22  --  19*  --   --   --  21*   < >  --    BUN  --   --   --   --  16.1  --   --   --  16.8  --  17.5  --   --   --  17.5   < >  --    CR  --   --   --   --  0.84  --   --   --  0.77  --  0.83  --   --   --  0.80   < >  --    ANIONGAP  --   --   --   --  7  --   --   --  7  --  11  --   --   --  10   < >  --    MAK  --   --   --   --  8.3*  --   --   --  8.4*  --  8.5*  --   --   --  9.5   < >  --    * 183* 167*  --  191*   < >  --    < > 155*   < > 205*   < >  --    < > 191*   < >  --    ALBUMIN  --   --   --   --   --   --   --   --  3.1*  --  3.2*  --   --   --  3.5   < >  --    PROTTOTAL  --   --   --   --   --   --   --   --  4.9*  --  5.2*  --   --   --  5.4*   < >  --    BILITOTAL  --   --   --   --   --   --   --   --  0.3  --  0.4  --   --   --  0.6   < >  --    ALKPHOS  --   --   --   --   --   --   --   --  35*  --  38*  --   --   --  43   < >  --    ALT  --   --   --   --   --   --   --   --  12  --  13  --   --   --  14   < >  --    AST  --   --   --   --   --   --   --   --  38  --  43  --   --   --  43   < >  --     < > = values in this interval not displayed.      GLUCOSE:   Recent Labs   Lab 05/01/25  1300 05/01/25  0859 05/01/25  0718 05/01/25  0650 05/01/25  0241 04/30/25  1174   * 183* 167* 191* 179* 175*

## 2025-05-01 NOTE — CONSULTS
Care Management Initial Consult    General Information  Assessment completed with: Spouse or significant other, Alanis  Type of CM/SW Visit: Initial Assessment    Primary Care Provider verified and updated as needed: Yes -Martin Marsh MD   Readmission within the last 30 days: no previous admission in last 30 days      Reason for Consult: discharge planning  Advance Care Planning: Advance Care Planning Reviewed: other (see comments) (Patient handed in copy of HCD at admission- Not uploaded at this time.)  Waiting for copy HCD to be uploaded     Communication Assessment  Patient's communication style: spoken language (English or Bilingual)    Hearing Difficulty or Deaf: no   Wear Glasses or Blind: yes    Cognitive  Cognitive/Neuro/Behavioral: unchanged from my previous assessment  Level of Consciousness: alert  Arousal Level: opens eyes spontaneously  Orientation: oriented x 4  Mood/Behavior: calm, cooperative  Best Language: 0 - No aphasia  Speech: clear, spontaneous, logical    Living Environment:   People in home: spouse  Alanis  Current living Arrangements: house      Able to return to prior arrangements: yes (pending therapy recs)     Family/Social Support:  Care provided by: self  Provides care for: pet(s)  Marital Status:   Support system: Wife, Children          Description of Support System: Supportive, Involved    Support Assessment: Adequate family and caregiver support, Adequate social supports    Current Resources:   Patient receiving home care services: No     Community Resources: None  Equipment currently used at home: none (per wife they have no DME)  Supplies currently used at home:      Employment/Financial:  Employment Status: employed full-time        Financial Concerns: none   Referral to Financial Worker: No     Does the patient's insurance plan have a 3 day qualifying hospital stay waiver?  Yes     Which insurance plan 3 day waiver is available? Alternative insurance  waiver    Will the waiver be used for post-acute placement? No    Lifestyle & Psychosocial Needs:  Social Drivers of Health     Food Insecurity: Low Risk  (4/29/2025)    Food Insecurity     Within the past 12 months, did you worry that your food would run out before you got money to buy more?: No     Within the past 12 months, did the food you bought just not last and you didn t have money to get more?: No   Depression: Not at risk (4/3/2025)    PHQ-2     PHQ-2 Score: 0   Housing Stability: High Risk (4/29/2025)    Housing Stability     Do you have housing? : No     Are you worried about losing your housing?: No   Tobacco Use: High Risk (4/3/2025)    Patient History     Smoking Tobacco Use: Some Days     Smokeless Tobacco Use: Never     Passive Exposure: Not on file   Financial Resource Strain: Low Risk  (4/29/2025)    Financial Resource Strain     Within the past 12 months, have you or your family members you live with been unable to get utilities (heat, electricity) when it was really needed?: No   Alcohol Use: Not At Risk (1/10/2019)    AUDIT-C     Frequency of Alcohol Consumption: Monthly or less     Average Number of Drinks: 1 or 2     Frequency of Binge Drinking: Never   Transportation Needs: Low Risk  (4/29/2025)    Transportation Needs     Within the past 12 months, has lack of transportation kept you from medical appointments, getting your medicines, non-medical meetings or appointments, work, or from getting things that you need?: No   Physical Activity: Insufficiently Active (10/30/2024)    Exercise Vital Sign     Days of Exercise per Week: 1 day     Minutes of Exercise per Session: 20 min   Interpersonal Safety: Low Risk  (4/29/2025)    Interpersonal Safety     Do you feel physically and emotionally safe where you currently live?: Yes     Within the past 12 months, have you been hit, slapped, kicked or otherwise physically hurt by someone?: No     Within the past 12 months, have you been humiliated or  emotionally abused in other ways by your partner or ex-partner?: No   Stress: No Stress Concern Present (10/30/2024)    [employer] - Occupational Stress Questionnaire     Feeling of Stress : Not at all   Social Connections: Unknown (10/30/2024)    Social Connection and Isolation Panel [NHANES]     Frequency of Communication with Friends and Family: Not on file     Frequency of Social Gatherings with Friends and Family: More than three times a week     Attends Adventist Services: Not on file     Active Member of Clubs or Organizations: Not on file     Attends Club or Organization Meetings: Not on file     Marital Status: Not on file   Health Literacy: Not on file     Functional Status:  Prior to admission patient needed assistance:   Dependent ADLs:: Independent  Dependent IADLs:: Independent  Assesssment of Functional Status: At functional baseline    Mental Health Status: no concerns        Chemical Dependency Status:  No concerns        Values/Beliefs:  Spiritual, Cultural Beliefs, Adventist Practices, Values that affect care: no             Discussed  Partnership in Safe Discharge Planning  document with patient/family: No    Additional Information:  From H&P 4/29/25: Rohit William is a 70 year old male with PMH of multivessel CAD, dilated aortic root/ascending aorta, HTN, T2DM, obesity. Presents to [facility] for coronary artery bypass graft x 3, left endoscopic harvest of greater saphenous vein, left internal mammary harvest, intraoperative transesophageal echocardiogram by anesthesia by Dr. Barnes.     RNCC completed CM assessment due to elevated risk score and need for discharge planning. RNCC attempted to meet with pt at bedside but he is working with therapies. RNCC called wife, Alanis, introduced RNCC role. Wife stated that patient was completely independent prior to admission. 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 Patient is also receiving Social  Security. Patient and wife live in a 2 level home, bedrooms upstairs with full bath[place] and their daughter lives across the street. They have cats.   Patient was not getting any services in the home prior to admission. Their 2 daughters are involved and supportive. Patient/wife stated patient handed in his HCD at admission. Writer stated that it is not uploaded yet.     Next Steps:   [] Follow for discharge planning and coordination.   Anticipate OP CR.   [] Look for HCD to be uploaded.   [] Confirm anticoagulation.   [x] [facility]  (PCP in  [place])  Wife to transport at discharge    Vibha Santamaria RNCC Float  5/1/2025  Nurse Coordinator    Covering for 6C (8162-1312)  Phone [phone]    Social Work and Care Management Department   SEARCHABLE in [facility] - search CARE COORDINATOR   [facility] ([phone]) Saturday & Sunday; ([phone])  Recognized Holidays   Units: 5A Onc 5201 - 5219 RNCC,  5A Onc 5220 thru 5240 RNCC, 5C OFFSERVICE [phone] RNCC & 5C OFF SERVICE [phone] RNCC   Units: 6B Vocera, 6C Card 6401 thru 6420 RNCC, 6C Card 6502 thru 6514 RNCC & 6C Card 6515 thru 6519 RNCC    Units: 7A SOT RNCC Vocera, 7B Med Surg Vocera, 7C Med Surg 7401 thru 7418 RNCC & 7C Med Surg 7502 thru 7521 RNCC   Units: 6A Vocera & 4A CVICU Vocera, 4C MICU Vocera, and 4E SICU Vocera     Units: 5 Ortho Vocera & 5 Med Surg Vocera    Units: 6 Med Surg Vocera & 8 Med Surg Vocera

## 2025-05-01 NOTE — PLAN OF CARE
"I: Monitored vitals and assessed pt status. [id number].    PRN Med: Dilaudid IV x1; Oxycodone PO x2     Vitals: /74 (BP Location: Left arm, Cuff Size: Adult Regular)   Pulse 118   Temp 98.4  F (36.9  C) (Oral)   Resp 18   Ht 1.854 m (6' 1\")   Wt 106.1 kg (233 lb 14.4 oz)   SpO2 94%   BMI 30.86 kg/m         A:   Neuro: A&O x4. Denies headache, dizziness, or lightheadedness. Baseline numbness in fingers of LUE. Able to make needs known.   Cardiac: ST w/ frequent PACs; 's-110's. Up to 120's-130's with minimal activity.  Afebrile. SBPs <140 without PRNs. C/o incisional chest pain.   Respiratory: Sating >92% on 2LNC. Denies SOB. LS diminished in the bases. Congested cough, encouraging IS use.   Diet/appetite: Tolerating low sat. fat/low Na diet.   Endocrine: BS check ACHS & 0200. Pt on sliding scale insulin.  GI/:  Last BM 4/27/25 per pt report. Denies abdominal pain. Some hesitancy with urination, voiding keyanna urine.   Activity:  Ax2  Pain: C/o severe incisional chest pain. PRN pain regimen providing some relief.  Skin: No new deficits noted, R PIVs, SL.    Plan: Continue with plan of care and notify team of changes.    Goal Outcome Evaluation:      Plan of Care Reviewed With: patient    Overall Patient Progress: no changeOverall Patient Progress: no change    Outcome Evaluation: ST; 's-110's. Up to 120's-130's with minimal activity. 2LNC. C/o incisional chest pain, PRN pain medications providing some relief.      "

## 2025-05-02 ENCOUNTER — APPOINTMENT (OUTPATIENT)
Dept: MRI IMAGING | Facility: CLINIC | Age: 70
DRG: 235 | End: 2025-05-02
Attending: STUDENT IN AN ORGANIZED HEALTH CARE EDUCATION/TRAINING PROGRAM
Payer: COMMERCIAL

## 2025-05-02 ENCOUNTER — APPOINTMENT (OUTPATIENT)
Dept: PHYSICAL THERAPY | Facility: CLINIC | Age: 70
DRG: 235 | End: 2025-05-02
Attending: SURGERY
Payer: COMMERCIAL

## 2025-05-02 ENCOUNTER — APPOINTMENT (OUTPATIENT)
Dept: GENERAL RADIOLOGY | Facility: CLINIC | Age: 70
DRG: 235 | End: 2025-05-02
Attending: PHYSICIAN ASSISTANT
Payer: COMMERCIAL

## 2025-05-02 VITALS
HEART RATE: 119 BPM | RESPIRATION RATE: 18 BRPM | TEMPERATURE: 98.1 F | SYSTOLIC BLOOD PRESSURE: 105 MMHG | BODY MASS INDEX: 31 KG/M2 | DIASTOLIC BLOOD PRESSURE: 73 MMHG | HEIGHT: 73 IN | OXYGEN SATURATION: 92 % | WEIGHT: 233.9 LBS

## 2025-05-02 LAB
ANION GAP SERPL CALCULATED.3IONS-SCNC: 9 MMOL/L (ref 7–15)
ATRIAL RATE - MUSE: 121 BPM
BUN SERPL-MCNC: 17.3 MG/DL (ref 8–23)
CALCIUM SERPL-MCNC: 8.1 MG/DL (ref 8.8–10.4)
CHLORIDE SERPL-SCNC: 100 MMOL/L (ref 98–107)
CREAT SERPL-MCNC: 0.74 MG/DL (ref 0.67–1.17)
DIASTOLIC BLOOD PRESSURE - MUSE: NORMAL MMHG
EGFRCR SERPLBLD CKD-EPI 2021: >90 ML/MIN/1.73M2
ERYTHROCYTE [DISTWIDTH] IN BLOOD BY AUTOMATED COUNT: 14.8 % (ref 10–15)
GLUCOSE BLDC GLUCOMTR-MCNC: 148 MG/DL (ref 70–99)
GLUCOSE BLDC GLUCOMTR-MCNC: 151 MG/DL (ref 70–99)
GLUCOSE BLDC GLUCOMTR-MCNC: 241 MG/DL (ref 70–99)
GLUCOSE SERPL-MCNC: 167 MG/DL (ref 70–99)
HCO3 SERPL-SCNC: 25 MMOL/L (ref 22–29)
HCT VFR BLD AUTO: 22.1 % (ref 40–53)
HGB BLD-MCNC: 7.6 G/DL (ref 13.3–17.7)
INTERPRETATION ECG - MUSE: NORMAL
MAGNESIUM SERPL-MCNC: 2.3 MG/DL (ref 1.7–2.3)
MCH RBC QN AUTO: 30.3 PG (ref 26.5–33)
MCHC RBC AUTO-ENTMCNC: 34.4 G/DL (ref 31.5–36.5)
MCV RBC AUTO: 88 FL (ref 78–100)
P AXIS - MUSE: 69 DEGREES
PHOSPHATE SERPL-MCNC: 3.1 MG/DL (ref 2.5–4.5)
PLATELET # BLD AUTO: 159 10E3/UL (ref 150–450)
POTASSIUM SERPL-SCNC: 4.1 MMOL/L (ref 3.4–5.3)
PR INTERVAL - MUSE: 146 MS
QRS DURATION - MUSE: 76 MS
QT - MUSE: 312 MS
QTC - MUSE: 443 MS
R AXIS - MUSE: -5 DEGREES
RBC # BLD AUTO: 2.51 10E6/UL (ref 4.4–5.9)
SODIUM SERPL-SCNC: 134 MMOL/L (ref 135–145)
SYSTOLIC BLOOD PRESSURE - MUSE: NORMAL MMHG
T AXIS - MUSE: -1 DEGREES
VENTRICULAR RATE- MUSE: 121 BPM
WBC # BLD AUTO: 11.6 10E3/UL (ref 4–11)

## 2025-05-02 PROCEDURE — 93005 ELECTROCARDIOGRAM TRACING: CPT

## 2025-05-02 PROCEDURE — 84100 ASSAY OF PHOSPHORUS: CPT | Performed by: NURSE PRACTITIONER

## 2025-05-02 PROCEDURE — 80051 ELECTROLYTE PANEL: CPT | Performed by: NURSE PRACTITIONER

## 2025-05-02 PROCEDURE — 250N000009 HC RX 250: Performed by: PHYSICIAN ASSISTANT

## 2025-05-02 PROCEDURE — 99221 1ST HOSP IP/OBS SF/LOW 40: CPT | Mod: 24 | Performed by: STUDENT IN AN ORGANIZED HEALTH CARE EDUCATION/TRAINING PROGRAM

## 2025-05-02 PROCEDURE — 93010 ELECTROCARDIOGRAM REPORT: CPT | Performed by: INTERNAL MEDICINE

## 2025-05-02 PROCEDURE — 70553 MRI BRAIN STEM W/O & W/DYE: CPT | Mod: 26 | Performed by: RADIOLOGY

## 2025-05-02 PROCEDURE — 70553 MRI BRAIN STEM W/O & W/DYE: CPT

## 2025-05-02 PROCEDURE — 250N000013 HC RX MED GY IP 250 OP 250 PS 637: Performed by: STUDENT IN AN ORGANIZED HEALTH CARE EDUCATION/TRAINING PROGRAM

## 2025-05-02 PROCEDURE — 71046 X-RAY EXAM CHEST 2 VIEWS: CPT

## 2025-05-02 PROCEDURE — 70544 MR ANGIOGRAPHY HEAD W/O DYE: CPT

## 2025-05-02 PROCEDURE — 97530 THERAPEUTIC ACTIVITIES: CPT | Mod: GP

## 2025-05-02 PROCEDURE — 97112 NEUROMUSCULAR REEDUCATION: CPT | Mod: GP

## 2025-05-02 PROCEDURE — A9585 GADOBUTROL INJECTION: HCPCS | Performed by: STUDENT IN AN ORGANIZED HEALTH CARE EDUCATION/TRAINING PROGRAM

## 2025-05-02 PROCEDURE — 255N000002 HC RX 255 OP 636: Performed by: STUDENT IN AN ORGANIZED HEALTH CARE EDUCATION/TRAINING PROGRAM

## 2025-05-02 PROCEDURE — 94640 AIRWAY INHALATION TREATMENT: CPT | Mod: 76

## 2025-05-02 PROCEDURE — 36415 COLL VENOUS BLD VENIPUNCTURE: CPT | Performed by: NURSE PRACTITIONER

## 2025-05-02 PROCEDURE — 250N000011 HC RX IP 250 OP 636: Performed by: PHYSICIAN ASSISTANT

## 2025-05-02 PROCEDURE — 250N000013 HC RX MED GY IP 250 OP 250 PS 637: Performed by: SURGERY

## 2025-05-02 PROCEDURE — 85018 HEMOGLOBIN: CPT | Performed by: NURSE PRACTITIONER

## 2025-05-02 PROCEDURE — 97162 PT EVAL MOD COMPLEX 30 MIN: CPT | Mod: GP

## 2025-05-02 PROCEDURE — 250N000013 HC RX MED GY IP 250 OP 250 PS 637: Performed by: PHYSICIAN ASSISTANT

## 2025-05-02 PROCEDURE — 250N000011 HC RX IP 250 OP 636: Performed by: SURGERY

## 2025-05-02 PROCEDURE — 250N000013 HC RX MED GY IP 250 OP 250 PS 637

## 2025-05-02 PROCEDURE — 94640 AIRWAY INHALATION TREATMENT: CPT

## 2025-05-02 PROCEDURE — 97116 GAIT TRAINING THERAPY: CPT | Mod: GP

## 2025-05-02 PROCEDURE — 83735 ASSAY OF MAGNESIUM: CPT | Performed by: NURSE PRACTITIONER

## 2025-05-02 PROCEDURE — 250N000013 HC RX MED GY IP 250 OP 250 PS 637: Performed by: NURSE PRACTITIONER

## 2025-05-02 PROCEDURE — 999N000157 HC STATISTIC RCP TIME EA 10 MIN

## 2025-05-02 PROCEDURE — 120N000005 HC R&B MS OVERFLOW UMMC

## 2025-05-02 PROCEDURE — 71046 X-RAY EXAM CHEST 2 VIEWS: CPT | Mod: 26 | Performed by: RADIOLOGY

## 2025-05-02 RX ORDER — LACTULOSE 10 G/10G
20 SOLUTION ORAL ONCE
Status: COMPLETED | OUTPATIENT
Start: 2025-05-02 | End: 2025-05-02

## 2025-05-02 RX ORDER — IPRATROPIUM BROMIDE AND ALBUTEROL SULFATE 2.5; .5 MG/3ML; MG/3ML
3 SOLUTION RESPIRATORY (INHALATION)
Status: DISCONTINUED | OUTPATIENT
Start: 2025-05-02 | End: 2025-05-03 | Stop reason: SINTOL

## 2025-05-02 RX ORDER — POTASSIUM CHLORIDE 750 MG/1
40 TABLET, EXTENDED RELEASE ORAL ONCE
Status: COMPLETED | OUTPATIENT
Start: 2025-05-02 | End: 2025-05-02

## 2025-05-02 RX ORDER — FUROSEMIDE 10 MG/ML
40 INJECTION INTRAMUSCULAR; INTRAVENOUS ONCE
Status: COMPLETED | OUTPATIENT
Start: 2025-05-02 | End: 2025-05-02

## 2025-05-02 RX ORDER — GADOBUTROL 604.72 MG/ML
10 INJECTION INTRAVENOUS ONCE
Status: COMPLETED | OUTPATIENT
Start: 2025-05-02 | End: 2025-05-02

## 2025-05-02 RX ADMIN — ACETAMINOPHEN 975 MG: 325 TABLET ORAL at 04:43

## 2025-05-02 RX ADMIN — BISACODYL 10 MG: 10 SUPPOSITORY RECTAL at 07:59

## 2025-05-02 RX ADMIN — FUROSEMIDE 40 MG: 10 INJECTION, SOLUTION INTRAMUSCULAR; INTRAVENOUS at 09:40

## 2025-05-02 RX ADMIN — METHOCARBAMOL 500 MG: 500 TABLET ORAL at 20:18

## 2025-05-02 RX ADMIN — METOPROLOL TARTRATE 25 MG: 25 TABLET, FILM COATED ORAL at 20:18

## 2025-05-02 RX ADMIN — PANTOPRAZOLE SODIUM 40 MG: 40 TABLET, DELAYED RELEASE ORAL at 07:59

## 2025-05-02 RX ADMIN — LACTULOSE 20 G: 20 POWDER, FOR SOLUTION ORAL at 13:14

## 2025-05-02 RX ADMIN — METHOCARBAMOL 500 MG: 500 TABLET ORAL at 07:59

## 2025-05-02 RX ADMIN — POTASSIUM CHLORIDE 40 MEQ: 750 TABLET, EXTENDED RELEASE ORAL at 09:40

## 2025-05-02 RX ADMIN — ACETAMINOPHEN 975 MG: 325 TABLET ORAL at 16:00

## 2025-05-02 RX ADMIN — RAMELTEON 8 MG: 8 TABLET, FILM COATED ORAL at 22:23

## 2025-05-02 RX ADMIN — METOPROLOL TARTRATE 25 MG: 25 TABLET, FILM COATED ORAL at 07:59

## 2025-05-02 RX ADMIN — SENNOSIDES AND DOCUSATE SODIUM 2 TABLET: 50; 8.6 TABLET ORAL at 07:59

## 2025-05-02 RX ADMIN — ATORVASTATIN CALCIUM 10 MG: 10 TABLET, FILM COATED ORAL at 20:18

## 2025-05-02 RX ADMIN — INSULIN GLARGINE 10 UNITS: 100 INJECTION, SOLUTION SUBCUTANEOUS at 08:01

## 2025-05-02 RX ADMIN — INSULIN ASPART 5 UNITS: 100 INJECTION, SOLUTION INTRAVENOUS; SUBCUTANEOUS at 13:13

## 2025-05-02 RX ADMIN — HEPARIN SODIUM 5000 UNITS: 5000 INJECTION, SOLUTION INTRAVENOUS; SUBCUTANEOUS at 04:44

## 2025-05-02 RX ADMIN — HEPARIN SODIUM 5000 UNITS: 5000 INJECTION, SOLUTION INTRAVENOUS; SUBCUTANEOUS at 13:13

## 2025-05-02 RX ADMIN — IPRATROPIUM BROMIDE AND ALBUTEROL SULFATE 3 ML: .5; 3 SOLUTION RESPIRATORY (INHALATION) at 21:36

## 2025-05-02 RX ADMIN — IPRATROPIUM BROMIDE AND ALBUTEROL SULFATE 3 ML: .5; 3 SOLUTION RESPIRATORY (INHALATION) at 17:48

## 2025-05-02 RX ADMIN — MAGNESIUM HYDROXIDE 30 ML: 400 SUSPENSION ORAL at 07:59

## 2025-05-02 RX ADMIN — ASPIRIN 81 MG CHEWABLE TABLET 162 MG: 81 TABLET CHEWABLE at 07:59

## 2025-05-02 RX ADMIN — INSULIN ASPART 1 UNITS: 100 INJECTION, SOLUTION INTRAVENOUS; SUBCUTANEOUS at 18:11

## 2025-05-02 RX ADMIN — METHOCARBAMOL 500 MG: 500 TABLET ORAL at 16:00

## 2025-05-02 RX ADMIN — GADOBUTROL 10 ML: 604.72 INJECTION INTRAVENOUS at 21:15

## 2025-05-02 RX ADMIN — HEPARIN SODIUM 5000 UNITS: 5000 INJECTION, SOLUTION INTRAVENOUS; SUBCUTANEOUS at 20:21

## 2025-05-02 RX ADMIN — OXYCODONE HYDROCHLORIDE 5 MG: 5 TABLET ORAL at 04:44

## 2025-05-02 RX ADMIN — POLYETHYLENE GLYCOL 3350 17 G: 17 POWDER, FOR SOLUTION ORAL at 08:00

## 2025-05-02 RX ADMIN — INSULIN ASPART 2 UNITS: 100 INJECTION, SOLUTION INTRAVENOUS; SUBCUTANEOUS at 08:00

## 2025-05-02 ASSESSMENT — ACTIVITIES OF DAILY LIVING (ADL)
ADLS_ACUITY_SCORE: 47
ADLS_ACUITY_SCORE: 48
ADLS_ACUITY_SCORE: 50
ADLS_ACUITY_SCORE: 50
ADLS_ACUITY_SCORE: 47
ADLS_ACUITY_SCORE: 50
ADLS_ACUITY_SCORE: 48
ADLS_ACUITY_SCORE: 47
ADLS_ACUITY_SCORE: 48
ADLS_ACUITY_SCORE: 47
ADLS_ACUITY_SCORE: 49
ADLS_ACUITY_SCORE: 49
ADLS_ACUITY_SCORE: 47
ADLS_ACUITY_SCORE: 53
ADLS_ACUITY_SCORE: 47
ADLS_ACUITY_SCORE: 48
ADLS_ACUITY_SCORE: 49
ADLS_ACUITY_SCORE: 49
ADLS_ACUITY_SCORE: 50
ADLS_ACUITY_SCORE: 48
ADLS_ACUITY_SCORE: 49

## 2025-05-02 NOTE — PLAN OF CARE
Temp: 99.1  F (37.3  C) Temp src: Oral BP: 125/64 Pulse: 107   Resp: 18 SpO2: 95 % O2 Device: None (Room air)     Neuro: A&O x4. Intermittently confused. Brain MRI ordered - checklist sent.  Cardiac: Tele in place, SR with frequent PAC's & infrequent PVCs - rates 100-120. Afebrile  Resp: VSS on RA. Denies SOB. Scheduled nebs.  GI/: Voiding spontaneously. BM x1 this shift - large. Incontinent.  Skin: No new deficits noted  LDAs: R PIV in place SL.   Activity: Up Ax1-2 w/ GB & walker.      Plan: Continue to monitor and follow POC. Plan for Brain MRI and transition to ARU/TCU when medically ready. Notify CVTS with changes

## 2025-05-02 NOTE — PLAN OF CARE
"I: Monitored vitals and assessed pt status. [id number]      PRN Med: Oxycodone PO x2     Vitals: /68 (BP Location: Left arm, Cuff Size: Adult Regular)   Pulse 105   Temp 97.4  F (36.3  C) (Oral)   Resp 18   Ht 1.854 m (6' 1\")   Wt 106.9 kg (235 lb 10.8 oz)   SpO2 98%   BMI 31.09 kg/m            A:   Neuro: A&O x4. Intermittently disoriented to time, easily re-oriented. Forgetful and slow to follow commands. Pronator drift noted on left side this morning, Lambert William MD notified and aware. Denies headache, dizziness, or lightheadedness. Baseline numbness in fingers of LUE. Able to make needs known.   Cardiac: SR/ST w/ frequent PACs, rare/occ. PVCs; HR 90's-110's, occasionally 120's-130's, but not sustained.  Afebrile. SBPs <140 without PRNs. C/o incisional chest pain.   Respiratory: Sating >92% on 1-2LNC. LOPEZ. LS diminished in the bases. Congested cough, encouraging IS use.   Diet/appetite: Tolerating low sat. fat/low Na diet. Poor appetite.  Endocrine: BS check ACHS. Pt on sliding scale insulin.  GI/:  Last BM 4/27/25 per pt report. Denies abdominal pain. Some hesitancy with urination, adequate output.  Activity:  Ax2  Pain: C/o moderate incisional chest pain. PRN pain regimen adequately controlling pain.  Skin: No new deficits noted. R PIV, SL.     Plan: Continue with plan of care and notify team of changes.    Goal Outcome Evaluation:      Plan of Care Reviewed With: patient    Overall Patient Progress: no changeOverall Patient Progress: no change    Outcome Evaluation: ST w/ frequent PACs, rare/occ. PVCs. HR 90's-130's. 1-2LNC. Pain adequately controlled with current regimen.      "

## 2025-05-02 NOTE — PROGRESS NOTES
"CLINICAL NUTRITION SERVICES    Reason for Assessment:  Heart-healthy nutrition education, pt s/p CABG    Diet History:  Pt not known to this service PTA.     Unknown if pt has received heart-healthy nutrition education in the past. Two of patient's family members were in his room earlier today. Family members were encouraging oral intake and high protein options. Pt leaving for procedure at time of nutrition visit. Pt in room alone sleeping when reattempting in the afternoon.       Nutrition Diagnosis:  Unable to assess.     Nutrition Prescription/Recs:  Continue heart-healthy diet.      Interventions:  Nutrition Education: Attempted verbal instruction on a heart-healthy diet. Pt leaving for procedure or sleeping during attempts. Provided handouts (pt and family are aware): Making Sense of Food Labels and Nutrition Care Manual Heart-Healthy Consistent Carbohydrate Nutrition Therapy.     Oral supplements: Ordered Ensure Max @ 10 and 2    Goals:    Pt will list at least two interventions to make current meal plan more heart-healthy.     Follow-up:   Patient to ask any further nutrition-related questions before discharge. In addition, pt may request outpatient RD appointment.     Gaby Viera, MS, RD, LD, CNSC      No longer available via pager  6C (beds 5625-4285 and [id number]): Vocera \"6C Clinical Dietitian\"   Weekend/Holiday: Vocera \"Weekend Clinical Dietitian\"   "

## 2025-05-02 NOTE — PLAN OF CARE
"Hx  Multivessel CAD, dilated aortic root/ascending aorta, HTN, T2DM, obesity.     Dx Scheduled CAB X 3.    AO to place and person but not time, patient needs to be reminded to use his left arm, bed and chair alarm on, patient's family expressing concern for patient \"seeming 'out of it' since coming from ICU\", CVTS updated and will talk to family, neuro consult and head CT ordered, see CVTS and PT notes, sinus to sinus tach, 95% on RA, expiratory wheezes, scheduled nebs, IV lasix once. Adequate UOP through external catheter; intermittent incontinence. VS WDL, food encouraged, bowel medications given including lactulose, BM X 1, assist of 1-2 with GB and walker.     Plan: Discharge to ARU vs TCU  "

## 2025-05-02 NOTE — CONSULTS
[facility]    Stroke Consult Note    Reason for Consult: Left sided weakness and esperanza neglect    Chief Complaint: No chief complaint on file.     HPI  Rohit William is a 70 year old male with a past medical history of multivessel CAD, dilated aortic root/ascending aorta, HTN, T2DM, and obesity, was admitted for a 3vCABG procedure on 4/29/2025. Today, the stroke team was consulted due to concerns of left sided weakness and esperanza-neglect, noted by the physical therapy team. Upon reaching out to primary team, the stroke team found uncertainty regarding the patients last known well, with suggestion that it may have been prior to the surgery on 4/29/2025. A chart review revealed that the overnight nurse did document forgetfulness, disoriented to time and left pronator drift.     Impression  # Left sided weakness: Possible post procedure Acute ischemic stroke.  70 year old male with a past medical history of multivessel CAD, dilated aortic root/ascending aorta, HTN, T2DM, and obesity admitted for a 3vCABG procedure on 4/29/2025. Stroke team was consulted due to concerns of left sided weakness and esperanza-neglect. On our exam, the patient is alert and oriented x 3. There is questionable right sided facial droop and left sided drift noted in both the upper and lower extremity. The remainder of the exam is unremarkable, with no signs of esperanza-neglect observed. Given the patient's recent CABG, the possibility of a stroke remains, though the unclear LKW and recent surgery make him ineligible for acute thrombolytics. However, based on the clinical presentation and exam findings, there is low concern for a large vessel occlusion. As such, we recommend deferring a head CT with a neck CTA and insetad suggest an MRI brain with and without contrast, along with an MRA for further evaluation.    Recommendations   MRI Brain w &w/o contrast along with MR angiogram.    Patient Follow-up    -  "final recommendation pending work-up    Thank you for this consult. We will continue to follow.     This patient was seen and discussed with Dr. Hussein (stroke attending).     I, Leonard Doherty, am serving as a scribe on May 2, 2025, 3:29 PM to document services personally performed by Dr. William Hussein (attending) based on my observations and the provider's statements to me.       William Hussein MD  Vascular Neurology    To page me or covering stroke neurology team member, click here: [facility]COM  Choose \"On Call\" tab at top, then select \"NEUROLOGY/ALL SITES\" from middle drop-down box, press Enter, then look for \"stroke\" or \"telestroke\" for your site.  _____________________________________________________    Clinically Significant Risk Factors         # Hyponatremia: Lowest Na = 134 mmol/L in last 2 days, will monitor as appropriate       # Hypoalbuminemia: Lowest albumin = 3.1 g/dL at 4/30/2025  3:09 AM, will monitor as appropriate               # DMII: A1C = 8.6 % (Ref range: <5.7 %) within past 6 months, PRESENT ON ADMISSION  # Obesity: Estimated body mass index is 31.09 kg/m  as calculated from the following:    Height as of this encounter: 1.854 m (6' 1\").    Weight as of this encounter: 106.9 kg (235 lb 10.8 oz)., PRESENT ON ADMISSION     # Financial/Environmental Concerns: none   # History of CABG: noted on surgical history         Past Medical History    Past Medical History:   Diagnosis Date    CAD (coronary artery disease)     Diabetes (H)     Hypertension      Medications   Home Meds  Prior to Admission medications    Medication Sig Start Date End Date Taking? Authorizing Provider   aspirin (ASA) 81 MG tablet Take 81 mg by mouth every morning. 1/10/19  Yes Martin Marsh MD   atorvastatin (LIPITOR) 10 MG tablet Take 1 tablet (10 mg) by mouth daily or cholesterol/prevents hearts attacks/strokes  Patient taking differently: Take 10 mg by mouth every morning. or cholesterol/prevents hearts " attacks/strokes 5/7/24  Yes Martin Marsh MD   atorvastatin (LIPITOR) 80 MG tablet Take 1 tablet (80 mg) by mouth daily.  Patient taking differently: Take 80 mg by mouth every morning. 2/13/25  Yes Sydney Lovett PA-C   fish oil-omega-3 fatty acids 1000 MG capsule Take 2 g by mouth daily.   Yes Reported, Patient   losartan-hydrochlorothiazide (HYZAAR) 100-25 MG tablet Take 1 tablet by mouth daily. For blood pressure in AM. This is a combination pill of cozaar and hydrochlorothiazide. Finish individual pills first  Patient taking differently: Take 1 tablet by mouth every morning. For blood pressure in AM. This is a combination pill of cozaar and hydrochlorothiazide. Finish individual pills first 10/31/24  Yes Martin Marsh MD   magnesium oxide (MAG-OX) 400 MG tablet Take 400 mg by mouth daily.   Yes Reported, Patient   metFORMIN (GLUCOPHAGE XR) 500 MG 24 hr tablet TAKE 1 TAB  BY MOUTH DAILY (WITH BREAKFAST) AND 1 TAB DAILY (WITH DINNER) 3/12/25  Yes Martin Marsh MD   sitagliptin (JANUVIA) 25 MG tablet Take 1 tablet (25 mg) by mouth daily.  Patient taking differently: Take 25 mg by mouth every morning. 2/11/25  Yes Martin Marsh MD   zinc gluconate 50 MG tablet Take 50 mg by mouth daily.   Yes Reported, Patient   blood glucose (NO BRAND SPECIFIED) lancets standard Use to test blood sugar one time daily or as directed. 7/26/24   Martin Marsh MD   blood glucose (ONETOUCH ULTRA) test strip USE TO TEST BLOOD SUGAR ONE TIME DAILY OR AS DIRECTED. 3/26/24   Martin Marsh MD   blood glucose monitoring (ONE TOUCH ULTRA 2) meter device kit Use to test blood sugar one time daily or as directed. 9/20/22   Martin Marsh MD   Lancets (ONETOUCH DELICA PLUS [id number]) MISC USE TO TEST BLOOD SUGAR ONE TIME DAILY OR AS DIRECTED. 6/18/24   Reported, Patient       Scheduled Meds  Current Facility-Administered Medications   Medication Dose Route Frequency Provider Last Rate Last Admin    acetaminophen  (TYLENOL) tablet 975 mg  975 mg Oral Q8H Ruslan Mccoy MD   975 mg at 05/02/25 1314    aspirin (ASA) chewable tablet 162 mg  162 mg Oral or NG Tube Daily Ruslan Mccoy MD   162 mg at 05/02/25 0759    atorvastatin (LIPITOR) tablet 10 mg  10 mg Oral QPM Mickie Schreiber MD   10 mg at 05/01/25 2023    heparin ANTICOAGULANT injection 5,000 Units  5,000 Units Subcutaneous Q8H Ruslan Mccoy MD   5,000 Units at 05/02/25 1313    insulin aspart (NovoLOG) injection (RAPID ACTING)  1-10 Units Subcutaneous TID AC Tomasz Hurley PA-C   5 Units at 05/02/25 1313    insulin aspart (NovoLOG) injection (RAPID ACTING)  1-7 Units Subcutaneous At Bedtime Tomasz Hurley PA-C        insulin glargine (LANTUS PEN) injection 10 Units  10 Units Subcutaneous Q24H Tomasz Hurley PA-C   10 Units at 05/02/25 0801    ipratropium - albuterol 0.5 mg/2.5 mg/3 mL (DUONEB) neb solution 3 mL  3 mL Nebulization 4x daily Bj Grullon PA        methocarbamol (ROBAXIN) tablet 500 mg  500 mg Oral 4x Daily Tomasz Hurley PA-C   500 mg at 05/02/25 1314    metoprolol tartrate (LOPRESSOR) tablet 25 mg  25 mg Oral BID Urbano Bell APRN CNP   25 mg at 05/02/25 0759    pantoprazole (PROTONIX) EC tablet 40 mg  40 mg Oral Daily Ruslan Mccoy MD   40 mg at 05/02/25 0759    polyethylene glycol (MIRALAX) Packet 17 g  17 g Oral Daily Ruslan Mccoy MD   17 g at 05/02/25 0800    ramelteon (ROZEREM) tablet 8 mg  8 mg Oral At Bedtime Lambert William MD        senna-docusate (SENOKOT-S/PERICOLACE) 8.6-50 MG per tablet 2 tablet  2 tablet Oral BID Urbano Bell APRN CNP   2 tablet at 05/02/25 0759    sodium chloride (PF) 0.9% PF flush 3 mL  3 mL Intracatheter Q8H Ruslan Mccoy MD   3 mL at 05/02/25 0801       Infusion Meds  Current Facility-Administered Medications   Medication Dose Route Frequency Provider Last Rate Last Admin       Allergies   Allergies   Allergen Reactions    Jardiance [Empagliflozin]      rash          PHYSICAL EXAMINATION    Temp:  [97.4  F (36.3  C)-98.6  F (37  C)] 98.6  F (37  C)  Pulse:  [105-128] 105  Resp:  [18-20] 20  BP: (101-119)/(52-73) 101/73  SpO2:  [92 %-98 %] 97 %    Neurologic  Mental Status:  alert, oriented x 3, follows commands, speech clear and fluent  Cranial Nerves:  visual fields intact, EOMI with normal smooth pursuit, facial sensation intact and symmetric, hearing not formally tested but intact to conversation, no dysarthria, questionable right sided facial droop, prominent on activation which could be his baseline.  Motor:  no abnormal movements, able to move all limbs spontaneously, left upper and lower extremity drift but doesn't hit the bed.  Reflexes:   deferred  Sensory:  light touch sensation intact and symmetric throughout upper and lower extremities, no extinction on double simultaneous stimulation   Coordination:  normal finger-to-nose without dysmetria with RUE but minimal ataxia noted with LUE which could be secondary to weakness.  Station/Gait:  deferred    Stroke Scales    NIHSS  1a. Level of Consciousness 0-->Alert, keenly responsive   1b. LOC Questions 0-->Answers both questions correctly   1c. LOC Commands 0-->Performs both tasks correctly   2.   Best Gaze 0-->Normal   3.   Visual 0-->No visual loss   4.   Facial Palsy 0-->Minor paralysis (flattened nasolabial fold, asymmetry on smiling)   5a. Motor Arm, Left 1-->Drift, limb holds 90 (or 45) degrees, but drifts down before full 10 seconds, does not hit bed or other support   5b. Motor Arm, Right 0-->No drift, limb holds 90 (or 45) degrees for full 10 secs   6a. Motor Leg, Left 1-->Drift, leg falls by the end of the 5-sec period but does not hit bed   6b. Motor Leg, right 0-->No drift, leg holds 30 degree position for full 5 secs   7.   Limb Ataxia 1-->Present in one limb   8.   Sensory 0-->Normal, no sensory loss   9.   Best Language 0-->No aphasia, normal   10. Dysarthria 0-->Normal   11. Extinction and Inattention  0-->No abnormality   Total  3 (05/02/25 1525)       Imaging  I personally reviewed all imaging; relevant findings per HPI.    Labs Data   CBC  Recent Labs   Lab 05/02/25  0642 05/01/25  0651 04/30/25  0659 04/30/25  0309   WBC 11.6* 15.5*  --  15.8*   RBC 2.51* 2.74*  --  3.02*   HGB 7.6* 8.2* 8.6* 9.0*   HCT 22.1* 24.2*  --  25.9*    146*  --  172     Basic Metabolic Panel   Recent Labs   Lab 05/02/25  1312 05/02/25  0642 05/01/25  2233 05/01/25  0718 05/01/25  0650 04/30/25  0400 04/30/25  0309   NA  --  134*  --   --  135  --  136   POTASSIUM  --  4.1  --   --  4.6  --  4.1   CHLORIDE  --  100  --   --  103  --  107   CO2  --  25  --   --  25  --  22   BUN  --  17.3  --   --  16.1  --  16.8   CR  --  0.74  --   --  0.84  --  0.77   * 167* 173*   < > 191*   < > 155*   MAK  --  8.1*  --   --  8.3*  --  8.4*    < > = values in this interval not displayed.     Liver Panel  Recent Labs   Lab 04/30/25  0309 04/30/25  0002 04/29/25  1617   PROTTOTAL 4.9* 5.2* 5.4*   ALBUMIN 3.1* 3.2* 3.5   BILITOTAL 0.3 0.4 0.6   ALKPHOS 35* 38* 43   AST 38 43 43   ALT 12 13 14     INR    Recent Labs   Lab Test 04/29/25  1617 04/29/25  1356 04/03/25  0927   INR 1.30* 1.43* 0.94           Stroke Consult Data Data   This was a non-emergent, non-telestroke consult.  I have personally spent a total of 45 minutes providing care today, time spent in reviewing medical records and devising the plan as recorded above.

## 2025-05-02 NOTE — PROGRESS NOTES
"Provider notification Bj Grullon: Pt demonstrates L LE proprioception and strength impairments that are concerning, in addition to L UE drift, demos overall L neglect (decreased L step length, bumping objects on left and unaware of L upper/lower extremity positioning). My concern is that it is unilateral.\" Provider addressing, please see PA note 5/2.       05/02/25 1200   Appointment Info   Signing Clinician's Name / Credentials (PT) Smiley Martínez, PT   Rehab Comments (PT) Sternal precs   Living Environment   People in Home spouse   Current Living Arrangements house   Home Accessibility stairs to enter home;stairs within home   Number of Stairs, Main Entrance 2   Stair Railings, Main Entrance none   Number of Stairs, Within Home, Primary greater than 10 stairs  (full flight to bedroom)   Stair Railings, Within Home, Primary railing on right side (ascending)   Transportation Anticipated family or friend will provide   Living Environment Comments Pt lives with spouse in 2 level house. Has 2 VERENICE. 13 stairs to upper level with R side handrail. Has walk-in shower w/ grab bars, owns shower chair from wifes previous surgeries (info updated per wife). They own 2 and 4 wh walkers   Self-Care   Usual Activity Tolerance good   Current Activity Tolerance poor   Equipment Currently Used at Home none  (they have in storage shower chair, 2 and 4 wh walkers available, per wife and dtr)   Activity/Exercise/Self-Care Comment Pt was very I with mobility I/ADLs, golfs frequently, works part time managing/owns manufacturing business. 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 He was asymptomatic, CAD and aortic aneurysm were incidental findings on CT calcium score. S/P coronary artery bypass graft x 3, left endoscopic harvest of greater saphenous vein, left internal mammary harvest by Dr. Barnes on 4/29/25.   Existing Precautions/Restrictions sternal   Weight-Bearing Status - LUE touch down weight-bearing   Weight-Bearing Status - RUE touch down weight-bearing   Cognition   Affect/Mental Status (Cognition) flat/blunted affect   Orientation Status (Cognition) oriented to;person;situation   Follows Commands (Cognition) follows one-step commands;50-74% accuracy;delayed response/completion;increased processing time needed;physical/tactile prompts required;repetition of directions required   Safety Deficit (Cognition) minimal deficit;awareness of need for assistance;insight into deficits/self-awareness;safety precautions awareness;safety precautions follow-through/compliance   Posture    Posture Protracted shoulders   Range of Motion (ROM)   ROM Comment L ankle/ knee/hip mildly impaired unless stronly cued to attend to side (see coordination)   Strength (Manual Muscle Testing)   Strength Comments L LE 3+/5 ER/ext hip, 4-/5 flex/add hip, 4-/5 knee and ankle 3+ throughout as compared to R LE 5-5+/5 throughout.   Bed Mobility   Comment, (Bed Mobility) Min A rolling and sup>sit   Transfers   Comment, (Transfers) Min A sit<>stand   Gait/Stairs (Locomotion)   Comment, (Gait/Stairs) Min A amb 3' wh walker   Balance   Balance Comments impaired static and dynamic standing with LOBs to L   Coordination   Coordination Comments Pt with impaired L LE proprioception as demo'd with impaired L heel to shin, L shortened step length, LOBs to L standing and L overall neglect with mobility sitting and standing. L impaired proprioception in UE (pronator drift, unsuccesful finger to nose)   Clinical Impression   Criteria for Skilled Therapeutic Intervention  Yes, treatment indicated   PT Diagnosis (PT) Impaired functional mobility   Influenced by the following impairments decreased strength, act tolerance, balance   Functional limitations due to impairments decreased I with transfers, gait, bed mob, barrier navigation, ADLs   Clinical Presentation (PT Evaluation Complexity) stable   Clinical Presentation Rationale clinical judgement   Clinical Decision Making (Complexity) moderate complexity   Planned Therapy Interventions (PT) balance training;bed mobility training;gait training;home exercise program;patient/family education;postural re-education;ROM (range of motion);stair training;strengthening;transfer training;progressive activity/exercise;risk factor education;home program guidelines;neuromuscular re-education;motor coordination training   Risk & Benefits of therapy have been explained evaluation/treatment results reviewed;care plan/treatment goals reviewed;risks/benefits reviewed;current/potential barriers reviewed;participants voiced agreement with care plan;participants included;patient   PT Total Evaluation Time   PT Eval, Moderate Complexity Minutes (00197) 10   Physical Therapy Goals   PT Frequency 6x/week   PT Goals Bed Mobility;Transfers;Gait;Stairs   PT: Bed Mobility Modified independent;Supine to/from sit;Rolling;Within precautions   PT: Transfers Modified independent;Sit to/from stand;Bed to/from chair;Assistive device;Within precautions   PT: Gait Modified independent;Assistive device;150 feet   PT: Stairs Modified independent;Assistive device;4 stairs;Rail on right   PT Discharge Planning   PT Discharge Recommendation (DC Rec) [facility] patient will benefit from intensive, interdisciplinary therapy.  Anticipate will be able to tolerate 3 hours of therapy per day   Physical Therapy Time and Intention   Total Session Time (sum of timed and untimed services) 10

## 2025-05-02 NOTE — PLAN OF CARE
A:   Neuro: A/Ox4. Pt forgetful and intermittently slow to follow commands. Repetition utilized. Neuros intact.   Cardiac/Tele:  ST w/ frequent PACs, intermittently bigeminal. BP ok, pt denied lightheadedness and dizziness with movement.    Respiratory: RA-2L NC. Oxygen intermittently needed for pt's sleep to keep stats >90%. Infrequent splinted productive cough.   GI/: No BM this shift, scheduled stool softeners & PRN Milk of Mag given. Lasix 60 mg given today with little response. Bladder scan of 506 mL and straight cath done x1. Small episodes of urine incontinence today, external catheter placed this evening.  Diet/Appetite: Regular diet, poor appetite.  Skin: No new deficits noted.   LDAs: 2 R PIV- SL  Activity: Assist x2 w/ GB and walker due to weakness in BLE. Pt has shuffling gait when ambulating. Sternal precautions in place, nurse frequently reminded pt to not use arms to push and pull.  Pain: Pt reported 5/10 pain at sternal incision. PRN Oxy given x1, otherwise managed with scheduled tylenol and robaxin.     P: Monitor UO, continue diuresis. Discharge likely to ARU when medically ready per therapies rec. Notify CVTS with changes.      Goal Outcome Evaluation:      Plan of Care Reviewed With: patient, family    Overall Patient Progress: no changeOverall Patient Progress: no change    Outcome Evaluation: ST w/ frequent PACs, intermittently bigeminal. -130s. RA-2L NC. Pt given 60 mg lasix without response, bladder scan & straight cath done x1.    Amena YOUNG RN  Shift [id number]

## 2025-05-02 NOTE — PROVIDER NOTIFICATION
"/73 (BP Location: Left arm, Cuff Size: Adult Regular)   Pulse 119   Temp 98.1  F (36.7  C) (Oral)   Resp 18   Ht 1.854 m (6' 1\")   Wt 106.1 kg (233 lb 14.4 oz)   SpO2 92%   BMI 30.86 kg/m      Time of notification: Initially notified on May 1, 2025 at 21:29 PM with frequent communication via Vocera between writer and provider.    Provider notified: Lambert William MD    Initially notified provider that patient seems off this evening as he appears to have a more flat affect and is more fatigued. Neurologic assessment also notable for forgetfulness otherwise intact. Requested order for VBG as patient has also been more tachycardic in 120's-130's today and wondering if inadequate perfusion is contributing. Provider opted to allow beta blocker \"to settle and see where he's at later.\" Provider noted that perfusion status \"should be fine with an adequate EF and him being tachycardic things should be making its way around the body.\" Provider also questioned if pt had been ambulating and sleeping well. Writer noted that patient did not seem to sleep well overnight, although pt reports he has been sleeping well. Ramelteon ordered to promote sleep and opted to treat as hospital delirium.     Provider notified again of conversation with pt wife around 2245. Pt wife explained she noticed a change in pt as well, noting that he would \"mumble\" his words at times and seemed more tired during the day. Additionally, neurologic assessment notable for disorientation to time, pt easily re-oriented and otherwise unchanged.     Provider notified again at 0500 of pronator drift on left side, otherwise neurologic assessment unchanged. Provider aware.              "

## 2025-05-02 NOTE — PROGRESS NOTES
Cardiovascular Surgery Progress Note  05/02/2025         Assessment and Plan:     Rohit William is a 70 year old male with PMH of multivessel CAD, dilated aortic root/ascending aorta, HTN, T2DM, obesity. He was asymptomatic, CAD and aortic aneurysm were incidental findings on CT calcium score.   S/P coronary artery bypass graft x 3, left endoscopic harvest of greater saphenous vein, left internal mammary harvest by Dr. Barnes on 4/29/25.      Cardiovascular:   Cardiogenic shock, resolved   CAD  Hx multivessel CAD s/p coronary artery bypass graft x3  Sinus tachy with PACs  HD stable, ST with -120. MAPS 70s-90s  Post op ECHO 5/1/25: EF 60 to 65%, normal IVC, no pericardial effusion  -  mg daily  - Atorvastatin 10 mg daily   - Metoprolol 25 mg BID  - Diuresis as below  - Post op echo 5/1/25 obtained due to sinus tachycardia, no acute findings.      Chest tubes: Removed in ICU   TPW: Removed in ICU      Pulmonary:  Post extubation respiratory insufficiency, improving   Pulmonary edema in the setting of hypervolemia   Extubated POD 0 to NC. Now saturating well on 2 lpm.   - Supplemental O2 PRN to keep sats > 92%. Wean off as tolerated.  - Pulm hygiene, IS, activity and deep breathing.  - Diuresis as below.     Neurology / MSK:  Acute post-operative pain   Pain well controlled with current regimen:  - Acetaminophen, PO oxycodone PRN, methocarbamol  Left sided Weakness and/or Neglect  Somnolence   - Family noting Rohit is very sleepy 5/2/25, more than past 2 days. Not as talkative.  - PT staff noted LLE proprioception and strength concerns with walking today, bumping things with left leg.   - Ordered CT Head w and w/o contrast 5/2  - Neurology consult 5/2. Unclear timing of onset but seems progressive over past 24-36 hours.      / Renal / Fluid / Electrolytes:  Hypervolemia   BL creat ~ 0.8, most recent creatinine WNL, adequate UOP.   FLUID STATUS: Pre-op weight 220 lbs. CXR shows pulmonary edema   -  "Diuresis: 40 mg IV lasix. Goal net negative 1-2 L.   - Replete lytes per protocol  - Strict I/O, daily weights  - Avoid/limit nephrotoxins as able     GI / Nutrition:   - Tolerating regular diet  - Continue bowel regimen, last BM was pre-op.    - PPI   - Continue scheduled bowel regimen, discussed with bedside RN to give as needed MOM, try lactulose 5/2.     Endocrine:  Stress induced hyperglycemia   Uncontrolled type II DM   Hgb A1c 8.6  - Initially managed on insulin drip postop, transitioned to Lantus 10 units and HIGH resistance sliding scale; goal BG <180 for optimal healing  - Hold PTA metformin      Infectious Disease:  Stress induced leukocytosis  WBC 11.6 (down trending), remains afebrile, no signs or symptoms of infection  - Completed perioperative antibiotics  - Continue to monitor fever curve, CBC     Hematology:   Acute blood loss anemia and thrombocytopenia  Hgb 7.6, Plt WNL, no signs or symptoms of active bleeding  - CBC daily     Anticoagulation:   -  mg      MSK/Skin:  Sternotomy  Surgical incision  - Sternal precautions  - Incisional cares per protocol     Prophylaxis:   - Stress ulcer prophylaxis: Pantoprazole 40 mg daily for 30 days  - DVT prophylaxis: Subcutaneous heparin, SCD     Disposition:   - Transferred to  on 5/1/25  - Therapies recommending discharge to ARU, needs to have BM.  - Medically Ready for Discharge: anticipated 1-3 days (diuresis, activity)      Clinically Significant Risk Factors         # Hyponatremia: Lowest Na = 134 mmol/L in last 2 days, will monitor as appropriate       # Hypoalbuminemia: Lowest albumin = 3.1 g/dL at 4/30/2025  3:09 AM, will monitor as appropriate               # DMII: A1C = 8.6 % (Ref range: <5.7 %) within past 6 months, PRESENT ON ADMISSION  # Obesity: Estimated body mass index is 31.09 kg/m  as calculated from the following:    Height as of this encounter: 1.854 m (6' 1\").    Weight as of this encounter: 106.9 kg (235 lb 10.8 oz)., PRESENT ON " "ADMISSION     # Financial/Environmental Concerns: none   # History of CABG: noted on surgical history       Discussed with Dr Barnes through written and/or verbal communication.      Bj Grullon PA-C  Cardiothoracic Surgery  Pager [phone]    8:36 AM   May 2, 2025        Interval History:     No overnight events. Productive wheezy cough that is not improving. Sinus tachy with PACs.  Also complains of L hand numbness and weakness in digits 4 and 5, present since surgery, has not changed.     States pain is well managed on current regimen. Slept great overnight.  Tolerating diet, is passing flatus, no BM. No nausea or vomiting.  Breathing well without complaints on 1-2 lpm.   Working with therapies and has not been up walking yet.   Denies chest pain, palpitations, dizziness, syncopal symptoms, fevers, chills, myalgias, or sternal popping/clicking.         Physical Exam:   Blood pressure 118/72, pulse 110, temperature 97.6  F (36.4  C), temperature source Oral, resp. rate 18, height 1.854 m (6' 1\"), weight 106.9 kg (235 lb 10.8 oz), SpO2 96%.  Vitals:    04/30/25 0515 05/01/25 0230 05/02/25 0440   Weight: 106.6 kg (235 lb 0.2 oz) 106.1 kg (233 lb 14.4 oz) 106.9 kg (235 lb 10.8 oz)      Weight; + 15 lbs since admit and trending up and down.   24 hr Fluid status; net loss 230 mL. UOP 1.4 L  MAPs: 81 - 86     Gen: A&Ox4, NAD  Neuro: L sided weakness, shuffling gait today with therapy, walked less than yesterday.   CV: Irregularly irregular and then later NSR, normal S1 S2, no murmurs, rubs or gallops.   Pulm: bilateral rhonchi with productive cough, normal breathing on 1 lpm  Abd: nondistended, normal BS, soft, nontender  Ext: trace peripheral edema  Incision: clean, dry, intact, no erythema, sternum stable  Tubes/drain sites: clean and dry         Data:    Imaging:  reviewed recent imaging, no acute concerns but possible broken sternal wire (the one superior to the double wires of mid-sternum). "     Labs:  BMP  Recent Labs   Lab 05/02/25  0642 05/01/25  2233 05/01/25  1857 05/01/25  1300 05/01/25  0718 05/01/25  0650 04/30/25  0400 04/30/25  0309 04/30/25  0103 04/30/25  0002   *  --   --   --   --  135  --  136  --  138   POTASSIUM 4.1  --   --   --   --  4.6  --  4.1  --  4.0   CHLORIDE 100  --   --   --   --  103  --  107  --  108*   MAK 8.1*  --   --   --   --  8.3*  --  8.4*  --  8.5*   CO2 25  --   --   --   --  25  --  22  --  19*   BUN 17.3  --   --   --   --  16.1  --  16.8  --  17.5   CR 0.74  --   --   --   --  0.84  --  0.77  --  0.83   * 173* 182* 188*   < > 191*   < > 155*   < > 205*    < > = values in this interval not displayed.     CBC  Recent Labs   Lab 05/02/25  0642 05/01/25  0651 04/30/25  0659 04/30/25  0309 04/29/25  2213   WBC 11.6* 15.5*  --  15.8* 22.0*   RBC 2.51* 2.74*  --  3.02* 3.40*   HGB 7.6* 8.2* 8.6* 9.0* 10.0*   HCT 22.1* 24.2*  --  25.9* 29.1*   MCV 88 88  --  86 86   MCH 30.3 29.9  --  29.8 29.4   MCHC 34.4 33.9  --  34.7 34.4   RDW 14.8 15.0  --  14.6 14.5    146*  --  172 222     INR  Recent Labs   Lab 04/29/25  1617 04/29/25  1356   INR 1.30* 1.43*      Hepatic Panel  Recent Labs   Lab 04/30/25  0309 04/30/25  0002 04/29/25  1617   AST 38 43 43   ALT 12 13 14   ALKPHOS 35* 38* 43   BILITOTAL 0.3 0.4 0.6   ALBUMIN 3.1* 3.2* 3.5     GLUCOSE:   Recent Labs   Lab 05/02/25  0642 05/01/25  2233 05/01/25  1857 05/01/25  1300 05/01/25  0859 05/01/25  0718   * 173* 182* 188* 183* 167*

## 2025-05-03 ENCOUNTER — APPOINTMENT (OUTPATIENT)
Dept: CT IMAGING | Facility: CLINIC | Age: 70
DRG: 235 | End: 2025-05-03
Attending: PHYSICIAN ASSISTANT
Payer: COMMERCIAL

## 2025-05-03 ENCOUNTER — APPOINTMENT (OUTPATIENT)
Dept: GENERAL RADIOLOGY | Facility: CLINIC | Age: 70
DRG: 235 | End: 2025-05-03
Attending: PHYSICIAN ASSISTANT
Payer: COMMERCIAL

## 2025-05-03 LAB
ANION GAP SERPL CALCULATED.3IONS-SCNC: 11 MMOL/L (ref 7–15)
BUN SERPL-MCNC: 16.5 MG/DL (ref 8–23)
CALCIUM SERPL-MCNC: 8 MG/DL (ref 8.8–10.4)
CHLORIDE SERPL-SCNC: 100 MMOL/L (ref 98–107)
CHOLEST SERPL-MCNC: 79 MG/DL
CREAT SERPL-MCNC: 0.68 MG/DL (ref 0.67–1.17)
EGFRCR SERPLBLD CKD-EPI 2021: >90 ML/MIN/1.73M2
ERYTHROCYTE [DISTWIDTH] IN BLOOD BY AUTOMATED COUNT: 14.6 % (ref 10–15)
GLUCOSE BLDC GLUCOMTR-MCNC: 150 MG/DL (ref 70–99)
GLUCOSE BLDC GLUCOMTR-MCNC: 163 MG/DL (ref 70–99)
GLUCOSE SERPL-MCNC: 162 MG/DL (ref 70–99)
HCO3 SERPL-SCNC: 24 MMOL/L (ref 22–29)
HCT VFR BLD AUTO: 21.8 % (ref 40–53)
HDLC SERPL-MCNC: 23 MG/DL
HGB BLD-MCNC: 7.3 G/DL (ref 13.3–17.7)
LDLC SERPL CALC-MCNC: 32 MG/DL
MAGNESIUM SERPL-MCNC: 2.3 MG/DL (ref 1.7–2.3)
MCH RBC QN AUTO: 29.8 PG (ref 26.5–33)
MCHC RBC AUTO-ENTMCNC: 33.5 G/DL (ref 31.5–36.5)
MCV RBC AUTO: 89 FL (ref 78–100)
NONHDLC SERPL-MCNC: 56 MG/DL
PHOSPHATE SERPL-MCNC: 3.6 MG/DL (ref 2.5–4.5)
PLATELET # BLD AUTO: 186 10E3/UL (ref 150–450)
POTASSIUM SERPL-SCNC: 4.1 MMOL/L (ref 3.4–5.3)
RADIOLOGIST FLAGS: ABNORMAL
RBC # BLD AUTO: 2.45 10E6/UL (ref 4.4–5.9)
SODIUM SERPL-SCNC: 135 MMOL/L (ref 135–145)
TRIGL SERPL-MCNC: 122 MG/DL
WBC # BLD AUTO: 9.4 10E3/UL (ref 4–11)

## 2025-05-03 PROCEDURE — 84100 ASSAY OF PHOSPHORUS: CPT | Performed by: INTERNAL MEDICINE

## 2025-05-03 PROCEDURE — 250N000013 HC RX MED GY IP 250 OP 250 PS 637: Performed by: NURSE PRACTITIONER

## 2025-05-03 PROCEDURE — 999N000157 HC STATISTIC RCP TIME EA 10 MIN

## 2025-05-03 PROCEDURE — 70498 CT ANGIOGRAPHY NECK: CPT

## 2025-05-03 PROCEDURE — 70498 CT ANGIOGRAPHY NECK: CPT | Mod: 26 | Performed by: RADIOLOGY

## 2025-05-03 PROCEDURE — 999N000065 XR CHEST PORT 1 VIEW

## 2025-05-03 PROCEDURE — 250N000011 HC RX IP 250 OP 636: Performed by: PHYSICIAN ASSISTANT

## 2025-05-03 PROCEDURE — 250N000013 HC RX MED GY IP 250 OP 250 PS 637

## 2025-05-03 PROCEDURE — 120N000005 HC R&B MS OVERFLOW UMMC

## 2025-05-03 PROCEDURE — 71045 X-RAY EXAM CHEST 1 VIEW: CPT | Mod: 26 | Performed by: STUDENT IN AN ORGANIZED HEALTH CARE EDUCATION/TRAINING PROGRAM

## 2025-05-03 PROCEDURE — 94799 UNLISTED PULMONARY SVC/PX: CPT

## 2025-05-03 PROCEDURE — 82310 ASSAY OF CALCIUM: CPT | Performed by: NURSE PRACTITIONER

## 2025-05-03 PROCEDURE — 250N000009 HC RX 250: Performed by: PHYSICIAN ASSISTANT

## 2025-05-03 PROCEDURE — 36415 COLL VENOUS BLD VENIPUNCTURE: CPT | Performed by: NURSE PRACTITIONER

## 2025-05-03 PROCEDURE — 999N000248 HC STATISTIC IV INSERT WITH US BY RN

## 2025-05-03 PROCEDURE — 250N000011 HC RX IP 250 OP 636: Performed by: SURGERY

## 2025-05-03 PROCEDURE — 999N000156 HC STATISTIC RCP CONSULT EA 30 MIN

## 2025-05-03 PROCEDURE — 250N000013 HC RX MED GY IP 250 OP 250 PS 637: Performed by: SURGERY

## 2025-05-03 PROCEDURE — 250N000013 HC RX MED GY IP 250 OP 250 PS 637: Performed by: PHYSICIAN ASSISTANT

## 2025-05-03 PROCEDURE — 94640 AIRWAY INHALATION TREATMENT: CPT

## 2025-05-03 PROCEDURE — 36569 INSJ PICC 5 YR+ W/O IMAGING: CPT

## 2025-05-03 PROCEDURE — 80061 LIPID PANEL: CPT

## 2025-05-03 PROCEDURE — 85027 COMPLETE CBC AUTOMATED: CPT | Performed by: NURSE PRACTITIONER

## 2025-05-03 PROCEDURE — 83735 ASSAY OF MAGNESIUM: CPT | Performed by: NURSE PRACTITIONER

## 2025-05-03 PROCEDURE — 272N000451 HC KIT SHRLOCK 5FR POWER PICC DOUBLE LUMEN

## 2025-05-03 RX ORDER — HEPARIN SODIUM,PORCINE 10 UNIT/ML
5-15 VIAL (ML) INTRAVENOUS
Status: DISCONTINUED | OUTPATIENT
Start: 2025-05-03 | End: 2025-05-07 | Stop reason: HOSPADM

## 2025-05-03 RX ORDER — LEVALBUTEROL INHALATION SOLUTION 1.25 MG/3ML
1.25 SOLUTION RESPIRATORY (INHALATION) EVERY 6 HOURS PRN
Status: DISCONTINUED | OUTPATIENT
Start: 2025-05-03 | End: 2025-05-07 | Stop reason: HOSPADM

## 2025-05-03 RX ORDER — METOPROLOL TARTRATE 1 MG/ML
2.5 INJECTION, SOLUTION INTRAVENOUS ONCE
Status: COMPLETED | OUTPATIENT
Start: 2025-05-03 | End: 2025-05-03

## 2025-05-03 RX ORDER — POTASSIUM CHLORIDE 750 MG/1
20 TABLET, EXTENDED RELEASE ORAL ONCE
Status: COMPLETED | OUTPATIENT
Start: 2025-05-03 | End: 2025-05-03

## 2025-05-03 RX ORDER — IOPAMIDOL 755 MG/ML
67 INJECTION, SOLUTION INTRAVASCULAR ONCE
Status: COMPLETED | OUTPATIENT
Start: 2025-05-03 | End: 2025-05-03

## 2025-05-03 RX ORDER — METHOCARBAMOL 500 MG/1
500 TABLET, FILM COATED ORAL 3 TIMES DAILY
Status: DISCONTINUED | OUTPATIENT
Start: 2025-05-03 | End: 2025-05-07 | Stop reason: HOSPADM

## 2025-05-03 RX ORDER — LIDOCAINE 40 MG/G
CREAM TOPICAL
Status: ACTIVE | OUTPATIENT
Start: 2025-05-03 | End: 2025-05-06

## 2025-05-03 RX ORDER — HEPARIN SODIUM,PORCINE 10 UNIT/ML
5-15 VIAL (ML) INTRAVENOUS EVERY 24 HOURS
Status: DISCONTINUED | OUTPATIENT
Start: 2025-05-03 | End: 2025-05-07 | Stop reason: HOSPADM

## 2025-05-03 RX ADMIN — HEPARIN SODIUM 5000 UNITS: 5000 INJECTION, SOLUTION INTRAVENOUS; SUBCUTANEOUS at 20:43

## 2025-05-03 RX ADMIN — METHOCARBAMOL 500 MG: 500 TABLET ORAL at 07:51

## 2025-05-03 RX ADMIN — ASPIRIN 81 MG CHEWABLE TABLET 162 MG: 81 TABLET CHEWABLE at 07:51

## 2025-05-03 RX ADMIN — AMIODARONE HYDROCHLORIDE 0.5 MG/MIN: 1.8 INJECTION, SOLUTION INTRAVENOUS at 17:01

## 2025-05-03 RX ADMIN — METOPROLOL TARTRATE 2.5 MG: 5 INJECTION INTRAVENOUS at 09:48

## 2025-05-03 RX ADMIN — PANTOPRAZOLE SODIUM 40 MG: 40 TABLET, DELAYED RELEASE ORAL at 07:51

## 2025-05-03 RX ADMIN — Medication 5 ML: at 17:02

## 2025-05-03 RX ADMIN — OXYCODONE HYDROCHLORIDE 5 MG: 5 TABLET ORAL at 00:27

## 2025-05-03 RX ADMIN — LIDOCAINE HYDROCHLORIDE ANHYDROUS 3 ML: 10 INJECTION, SOLUTION INFILTRATION at 15:30

## 2025-05-03 RX ADMIN — INSULIN GLARGINE 10 UNITS: 100 INJECTION, SOLUTION SUBCUTANEOUS at 07:51

## 2025-05-03 RX ADMIN — METHOCARBAMOL 500 MG: 500 TABLET ORAL at 20:40

## 2025-05-03 RX ADMIN — ATORVASTATIN CALCIUM 10 MG: 10 TABLET, FILM COATED ORAL at 20:40

## 2025-05-03 RX ADMIN — ACETAMINOPHEN 975 MG: 325 TABLET ORAL at 04:20

## 2025-05-03 RX ADMIN — ACETAMINOPHEN 975 MG: 325 TABLET ORAL at 11:44

## 2025-05-03 RX ADMIN — METOPROLOL TARTRATE 25 MG: 25 TABLET, FILM COATED ORAL at 07:51

## 2025-05-03 RX ADMIN — HEPARIN SODIUM 5000 UNITS: 5000 INJECTION, SOLUTION INTRAVENOUS; SUBCUTANEOUS at 04:20

## 2025-05-03 RX ADMIN — POTASSIUM CHLORIDE 20 MEQ: 750 TABLET, EXTENDED RELEASE ORAL at 15:50

## 2025-05-03 RX ADMIN — METHOCARBAMOL 500 MG: 500 TABLET ORAL at 15:50

## 2025-05-03 RX ADMIN — ACETAMINOPHEN 975 MG: 325 TABLET ORAL at 20:40

## 2025-05-03 RX ADMIN — METOPROLOL TARTRATE 2.5 MG: 5 INJECTION INTRAVENOUS at 09:29

## 2025-05-03 RX ADMIN — Medication 37.5 MG: at 20:40

## 2025-05-03 RX ADMIN — AMIODARONE HYDROCHLORIDE 1 MG/MIN: 1.8 INJECTION, SOLUTION INTRAVENOUS at 11:39

## 2025-05-03 RX ADMIN — HEPARIN SODIUM 5000 UNITS: 5000 INJECTION, SOLUTION INTRAVENOUS; SUBCUTANEOUS at 11:44

## 2025-05-03 RX ADMIN — IOPAMIDOL 67 ML: 755 INJECTION, SOLUTION INTRAVENOUS at 19:59

## 2025-05-03 RX ADMIN — AMIODARONE HYDROCHLORIDE 150 MG: 1.5 INJECTION, SOLUTION INTRAVENOUS at 11:05

## 2025-05-03 RX ADMIN — IPRATROPIUM BROMIDE AND ALBUTEROL SULFATE 3 ML: .5; 3 SOLUTION RESPIRATORY (INHALATION) at 08:47

## 2025-05-03 RX ADMIN — INSULIN ASPART 1 UNITS: 100 INJECTION, SOLUTION INTRAVENOUS; SUBCUTANEOUS at 07:51

## 2025-05-03 ASSESSMENT — ACTIVITIES OF DAILY LIVING (ADL)
ADLS_ACUITY_SCORE: 53
ADLS_ACUITY_SCORE: 52
ADLS_ACUITY_SCORE: 54
ADLS_ACUITY_SCORE: 53
ADLS_ACUITY_SCORE: 52
ADLS_ACUITY_SCORE: 53
ADLS_ACUITY_SCORE: 53
ADLS_ACUITY_SCORE: 52
ADLS_ACUITY_SCORE: 52
ADLS_ACUITY_SCORE: 53
ADLS_ACUITY_SCORE: 52
ADLS_ACUITY_SCORE: 53
ADLS_ACUITY_SCORE: 53
ADLS_ACUITY_SCORE: 52

## 2025-05-03 NOTE — PROGRESS NOTES
Vascular Access Services Notes:    VAS RN busy with lab draws. VAS RN will place PICC as soon as available.      Eulogio Torres, BRYANN, RN [facility]  Vascular Access Services  [facility]  [phone]

## 2025-05-03 NOTE — PROGRESS NOTES
Vascular Access Services Notes:    Pt NOT ready for PICC placement. Primary RN still has NOT done CHG bath & linen change. RN to contact VAS RN when ready.      Eulogio Torres, BSN, RN [facility]  Vascular Access Services  [facility]  [phone]

## 2025-05-03 NOTE — PROGRESS NOTES
Cardiovascular Surgery Progress Note  05/03/2025         Assessment and Plan:     Rohit William is a 70 year old male with PMH of multivessel CAD, dilated aortic root/ascending aorta, HTN, T2DM, obesity. He was asymptomatic, CAD and aortic aneurysm were incidental findings on CT calcium score.   S/P coronary artery bypass graft x 3, left endoscopic harvest of greater saphenous vein, left internal mammary harvest by Dr. Barnes on 4/29/25.      Cardiovascular:   Cardiogenic shock, resolved   CAD  Hx multivessel CAD s/p coronary artery bypass graft x3  Sinus tachy with PACs  HD stable, ST with -120. MAPS 70s-90s  Post op ECHO 5/1/25: EF 60 to 65%, normal IVC, no pericardial effusion  -  mg daily  - Atorvastatin 10 mg daily   - Metoprolol 37.5 mg BID  - Diuresis as below  - Post op echo 5/1/25 obtained due to sinus tachycardia, no acute findings.   Post-op Atrial fibrillation w RVR (140's)  - A-fib w RVR 5/3 am, given IV Metop 5 mg without conversion.   - Starting IV Amiodarone infusion for 24 hours, called Vascular access for PICC.     Chest tubes: Removed in ICU   TPW: Removed in ICU      Pulmonary:  Post extubation respiratory insufficiency, improving   Pulmonary edema in the setting of hypervolemia   Extubated POD 0 to NC. Now saturating well on 2 lpm.   - Supplemental O2 PRN to keep sats > 92%. Wean off as tolerated.  - Pulm hygiene, IS, activity and deep breathing.  - Diuresis as below.     Neurology / MSK:  Acute post-operative pain   Pain well controlled with current regimen:  - Acetaminophen, PO oxycodone PRN, methocarbamol  Left sided Weakness and/or Neglect  Somnolence   Acute multifocal infarcts, likely embolic  Chronic small vessel ischemic disease  - Family noting Rohit is very sleepy 5/2/25, more than past 2 days. Not as talkative. Found also that Rozerem was added 5/1 overnight by resident, discontinued 5/3.   - PT staff noted LLE proprioception and strength concerns with walking today,  bumping things with left leg.   - Neurology consult 5/2. Unclear timing of onset but seems progressive over past 24-36 hours.   - Brain MRI 5/2: acute multifocal infarcts, prominent in R corona radiata, likely embolic. Moderate chronic small vessel ischemic disease.    - Brain MRA 5/2: mild focal stenosis of P1 segment of Left Post cerebral artery   - Continue aspirin, statin, Neuro check q 4 hours   - Follow up with Neurology in 8 weeks.       / Renal / Fluid / Electrolytes:  Hypervolemia   BL creat ~ 0.8, most recent creatinine WNL, adequate UOP.   FLUID STATUS: Pre-op weight 220 lbs. CXR shows pulmonary edema   - Diuresis: 40 mg IV lasix. Goal net negative 1-2 L.   - Replete lytes per protocol  - Strict I/O, daily weights  - Avoid/limit nephrotoxins as able     GI / Nutrition:   - Tolerating regular diet  - Continue bowel regimen, last BM was pre-op.    - PPI   - Continue scheduled bowel regimen, discussed with bedside RN to give as needed MOM, try lactulose 5/2.     Endocrine:  Stress induced hyperglycemia   Uncontrolled type II DM   Hgb A1c 8.6  - Initially managed on insulin drip postop, transitioned to Lantus 10 units and HIGH resistance sliding scale; goal BG <180 for optimal healing  - Hold PTA metformin      Infectious Disease:  Stress induced leukocytosis  WBC 11.6 (down trending), remains afebrile, no signs or symptoms of infection  - Completed perioperative antibiotics  - Continue to monitor fever curve, CBC     Hematology:   Acute blood loss anemia and thrombocytopenia  Hgb 7.6, Plt WNL, no signs or symptoms of active bleeding  - CBC daily     Anticoagulation:   -  mg      MSK/Skin:  Sternotomy  Surgical incision  - Sternal precautions  - Incisional cares per protocol     Prophylaxis:   - Stress ulcer prophylaxis: Pantoprazole 40 mg daily for 30 days  - DVT prophylaxis: Subcutaneous heparin, SCD     Disposition:   - Transferred to  on 5/1/25  - Therapies recommending discharge to ARU, needs  "to have BM.  - Medically Ready for Discharge: anticipated 1-3 days (diuresis, activity)    Clinically Significant Risk Factors         # Hyponatremia: Lowest Na = 134 mmol/L in last 2 days, will monitor as appropriate       # Hypoalbuminemia: Lowest albumin = 3.1 g/dL at 4/30/2025  3:09 AM, will monitor as appropriate               # DMII: A1C = 8.6 % (Ref range: <5.7 %) within past 6 months   # Obesity: Estimated body mass index is 31.09 kg/m  as calculated from the following:    Height as of this encounter: 1.854 m (6' 1\").    Weight as of this encounter: 106.9 kg (235 lb 10.8 oz).      # Financial/Environmental Concerns: none   # History of CABG: noted on surgical history       Discussed with Dr Barnes through written communication.      Bj Grullon PA-C  Cardiothoracic Surgery  Pager [phone]    9:04 AM   May 3, 2025        Interval History:     No overnight events but Brain MRI/MRI reporting multifocal embolic infarcts.   A-fib w RVR this AM, IV metop given and IV Amio to start. Getting PICC line.     States pain is well managed on current regimen. Slept well overnight.  Tolerating diet, is passing flatus, + BM. No nausea or vomiting.  Breathing well without complaints.   Working with therapies and ambulating in halls with assistance.   Denies chest pain, palpitations, dizziness, syncopal symptoms, fevers, chills, myalgias, or sternal popping/clicking.         Physical Exam:   Blood pressure 124/81, pulse 99, temperature 98.7  F (37.1  C), temperature source Oral, resp. rate 16, height 1.854 m (6' 1\"), weight 106.9 kg (235 lb 10.8 oz), SpO2 98%.  Vitals:    04/30/25 0515 05/01/25 0230 05/02/25 0440   Weight: 106.6 kg (235 lb 0.2 oz) 106.1 kg (233 lb 14.4 oz) 106.9 kg (235 lb 10.8 oz)      Weight; + 13 lbs since admit and trending up.   24 hr Fluid status; net gain 470 mL. UOP 1.2 L (+ 2 unrecorded)  MAPs: 84 - 100     Gen: A&Ox4, NAD  Neuro: no focal deficits, moving extremities to command, mild L sided " extremity weakness  CV: A-fib w 's, prior was RRR, normal S1 S2, no murmurs, rubs or gallops.  Pulm: CTA, no wheezing or rhonchi, normal breathing on RA  Abd: nondistended, normal BS, soft, nontender  Ext: no peripheral edema  Incision: clean, dry, intact, no erythema, sternum stable  Tubes/drain sites: clean and dry         Data:    Imaging:  reviewed recent imaging, no acute concerns    Labs:  BMP  Recent Labs   Lab 05/03/25  0750 05/03/25  0706 05/02/25  2146 05/02/25  1806 05/02/25  1312 05/02/25  0642 05/01/25  0718 05/01/25  0650 04/30/25  0400 04/30/25  0309   NA  --  135  --   --   --  134*  --  135  --  136   POTASSIUM  --  4.1  --   --   --  4.1  --  4.6  --  4.1   CHLORIDE  --  100  --   --   --  100  --  103  --  107   MAK  --  8.0*  --   --   --  8.1*  --  8.3*  --  8.4*   CO2  --  24  --   --   --  25  --  25  --  22   BUN  --  16.5  --   --   --  17.3  --  16.1  --  16.8   CR  --  0.68  --   --   --  0.74  --  0.84  --  0.77   * 162* 148* 151*   < > 167*   < > 191*   < > 155*    < > = values in this interval not displayed.     CBC  Recent Labs   Lab 05/03/25  0706 05/02/25  0642 05/01/25  0651 04/30/25  0659 04/30/25  0309   WBC 9.4 11.6* 15.5*  --  15.8*   RBC 2.45* 2.51* 2.74*  --  3.02*   HGB 7.3* 7.6* 8.2* 8.6* 9.0*   HCT 21.8* 22.1* 24.2*  --  25.9*   MCV 89 88 88  --  86   MCH 29.8 30.3 29.9  --  29.8   MCHC 33.5 34.4 33.9  --  34.7   RDW 14.6 14.8 15.0  --  14.6    159 146*  --  172     INR  Recent Labs   Lab 04/29/25  1617 04/29/25  1356   INR 1.30* 1.43*      Hepatic Panel  Recent Labs   Lab 04/30/25  0309 04/30/25  0002 04/29/25  1617   AST 38 43 43   ALT 12 13 14   ALKPHOS 35* 38* 43   BILITOTAL 0.3 0.4 0.6   ALBUMIN 3.1* 3.2* 3.5     GLUCOSE:   Recent Labs   Lab 05/03/25  0750 05/03/25  0706 05/02/25  2146 05/02/25  1806 05/02/25  1312 05/02/25  0642   * 162* 148* 151* 241* 167*

## 2025-05-03 NOTE — PLAN OF CARE
AO X 4, start of shift, sinus dysrhythmia frequent pvc's, after PRN albuterol neb pt parox afib 's - 160's, IV metoprolol given with no change, albuterol neb discontinued for alternative neb, amio bolus and gtt started with HR dropping to 90's - 100's and rhythm sinus dys, 99% on room air, multiple large BM's, adequate UOP, denies pain, good appetite, pulm hygiene encouraged, family visiting in room.    PICC placed.

## 2025-05-03 NOTE — PROGRESS NOTES
Pt became tachycardic and converted to A-fib following his morning DuoNeb. Based on RCAT assessment, pt scores for PRN nebs, no significant improvement in aeration following bronchodilator. No hx of asthma/COPD, does not take nebs at home. Nebulizer converted to PRN and changed to Xopenex given tachyarrhythmia response with albuterol. Continuing flutter + IS and encouraging out of bed/mobility. Discussed with PA at bedside, agrees with plan.    RCAT Assessment for chest physiotherapy and airway clearance      Assessment:     Reason for Assessment: CVTS (05/03/25 0900)    Pulmonary History:    Pulmonary Status: No history of smoking (05/03/25 0900)    Surgical:  Surgical Status: Thoracic or upper abdominal (05/03/25 0900)    CXR:   Chest X-ray: Bilateral pulmonary infiltrates (05/03/25 0900)    Respiratory Pattern:    Respiratory Pattern: Regular pattern 12-20 (05/03/25 0900)    Breath Sounds:    Breath Sounds: Clear to auscultation (05/03/25 0900)    Effectiveness of Cough:     Cough Effectiveness: Strong, non-productive (05/03/25 0900)    Mental Status:    Mental Status: Alert, oriented, cooperative (05/03/25 0900)    Level of Activity:     Acuity Level : Acuity 4 (6-10 points) (05/03/25 0900)    Current O2 Requirement:   O2 Required for SpO2>=92%: No oxygen (05/03/25 0900)      Secretion Amount: no product sputum     Patient was continued on Flutter Valve as indicated by prevent or reverse atelectasis    Based on assessment, therapy BID and it is appropriate to perform Flutter Valve on patient.      Ana Alvarez, RT on 5/3/2025 at 9:39 AM

## 2025-05-03 NOTE — INTERIM SUMMARY
"BRIEF STROKE NEUROLOGY NOTE    MRI with multi-focal, punctate acute infarcts in bilateral hemispheres consistent with arina-procedural strokes 2/2 recent CABG.    UPDATED RECOMMENDATIONS:  - Neurochecks and Vital Signs every 4 hours  - Continue Aspirin daily for secondary stroke prevention  - Telemetry, EKG  - Bedside Glucose Monitoring  - A1c, Lipid Panel, Troponin x 3  - PT/OT/SLP  - Stroke Education  - Euthermia, Euglycemia    Stroke Team will continue to follow in AM. Additional recommendations pending completed workup.    This patient was discussed with the stroke attending faculty is Dr. Hussein.    Manjit Fowler DO  Neurology Resident PGY4  05/02/2025  Emergency/Urgent: Page \"Stroke First Call Resident\"  Non-urgent/Routine: Vocera messaging available    "

## 2025-05-03 NOTE — PROCEDURES
[facility]    Double Lumen PICC Placement    Date/Time: 5/3/2025 3:52 PM    Performed by: Niesha Wilson RN  Authorized by: Bj Grullon PA  Indications: vascular access      UNIVERSAL PROTOCOL   Site Marked: Yes  Prior Images Obtained and Reviewed:  Yes  Required items: Required blood products, implants, devices and special equipment available    Patient identity confirmed:  Verbally with patient, arm band and hospital-assigned identification number  NA - No sedation, light sedation, or local anesthesia (lidocaine was given local anesthesia)  Confirmation Checklist:  Patient's identity using two indicators, procedure was appropriate and matched the consent or emergent situation and correct equipment/implants were available  Time out: Immediately prior to the procedure a time out was called    Universal Protocol: the Joint Commission Universal Protocol was followed    Preparation: Patient was prepped and draped in usual sterile fashion       ANESTHESIA    Anesthesia:  Local infiltration  Local Anesthetic:  Lidocaine 1% without epinephrine  Anesthetic Total (mL):  3      SEDATION    Patient Sedated: No        Preparation: skin prepped with 2% chlorhexidine and skin prepped with ChloraPrep  Skin prep agent: skin prep agent completely dried prior to procedure  Sterile barriers: maximum sterile barriers were used: cap, mask, sterile gown, sterile gloves, and large sterile sheet  Hand hygiene: hand hygiene performed prior to central venous catheter insertion  Type of line used: PICC  Catheter type: double lumen  Lumen type: non-valved and power PICC  Lumen Identification: Purple and Red  Catheter size: 5 Fr  Brand: Bard  Lot number: [id number]  Placement method: venipuncture, MST, ultrasound and tip navigation system  Number of attempts: 1  Difficulty threading catheter: yes (has tendency to go IJ)  Successful placement: yes  Orientation: right  Catheter to  Vein (%): 34  Location: basilic vein (0.52 cm vein diameter)  Tip Location: SVC  Arm circumference: adults 10 cm  Extremity circumference: 31.5  Visible catheter length: 1  Total catheter length: 45  Dressing and securement: adhesive securement device, blood cleaned with CHG, chlorhexidine disc applied, dressing applied, gloves changed prior to final dressing, statlock, site cleansed, sterile dressing applied and transparent dressing  Post procedure assessment: blood return through all ports, free fluid flow and placement verified by x-ray  PROCEDURE Describe Procedure: PICC will be verified by chest xray. PICC okay to use once verified in central location by radiologist. Chest xray released.   Disposal: sharps and needle count correct at the end of procedure, needles and guidewire disposed in sharps container  Patient Tolerance:  Patient tolerated the procedure well with no immediate complications

## 2025-05-03 NOTE — PLAN OF CARE
"D: Coronary artery disease  (primary encounter diagnosis)    I: Monitored vitals and assessed pt status.   Changed: Alert and oriented x 4, clear mentation, and increased strength on the Left side. MRI completed last night  Running: None  PRN: Oxycodone x 1 for incisional pain, effective  Neuro: A&Ox4, with clear mentation throughout the shift. Patient had an uneventful night, slept well during the night.   Cardiac: ST with irregular rhythm,HR , Afebrile, VSS.   Respiratory: Lungs sound clear, denies dyspnea, and Sat>95% on RA  GI/: Active bowel sound, passing flatus, incontinent of bowel x 1, arina-area cares done, and Voiding spontaneously. Primofit intact with good UO.   Diet/appetite: Tolerating * diet. Denies nausea.  Activity: Up with assist x 2 with GB   Pain: Incisional pain is well managed with prn oxycodone  Skin: No new deficits noted.  Lines: Piv x 1, SL\"D  No intake/output data recorded.    Temp:  [97.4  F (36.3  C)-99.1  F (37.3  C)] 98  F (36.7  C)  Pulse:  [] 100  Resp:  [16-20] 16  BP: (101-135)/(64-85) 135/85  SpO2:  [93 %-99 %] 93 %    Goal Outcome Evaluation:      Plan of Care Reviewed With: patient    Overall Patient Progress: no changeOverall Patient Progress: no change    Outcome Evaluation: ST, Irregular rhythmn with frequent PVC, HR . Patient is A x O x 4 throughout the night.    P: Continue to monitor Pt status and report changes to treatment team.      "

## 2025-05-03 NOTE — PROGRESS NOTES
[facility]    Vascular Neurology Progress Note      Interval History     RN notes reviewed. MRI completed overnight and was notable for scattered, small infarcts concerning for embolic shower 2/2 recent procedure. Otherwise, there were NAEO.     Rohit feels fine this morning. Feels that the left arm is working better. No new symptoms.     Hospital Course     Chief complaint: No chief complaint on file.    Rohit William is a 70 year old male with a past medical history of multivessel CAD, dilated aortic root/ascending aorta, HTN, T2DM, and obesity, was admitted for a 3vCABG procedure on 4/29/2025. Today, the stroke team was consulted due to concerns of left sided weakness and esperanza-neglect, noted by the physical therapy team. Upon reaching out to primary team, the stroke team found uncertainty regarding the patients last known well, with suggestion that it may have been prior to the surgery on 4/29/2025. A chart review revealed that the overnight nurse did document forgetfulness, disoriented to time and left pronator drift.     Assessment and Plan     # Multifocal ischemic strokes, cardioembolic 2/2 recent procedure  # Left hemibody weakness    70 year old male with a past medical history of multivessel CAD, dilated aortic root/ascending aorta, HTN, T2DM, and obesity admitted for a 3vCABG procedure on 4/29/2025. Stroke team was consulted due to concerns of left sided weakness and esperanza-neglect. On our exam, the patient is alert and oriented x 3. There is questionable right sided facial droop and left sided drift noted in both the upper and lower extremity. The remainder of the exam is unremarkable, with no signs of esperanza-neglect observed. Given the patient's recent CABG, it was thought that he may have suffered a perioperative infarct, and as the patient's LKW was unknown, we opted to obtain MRI/MRA rather than CT/CTA.     MRI was completed overnight and was notable  for multifocal ischemic strokes throughout most vascular territories (anterior>posterior) bilaterally. Given the uniform appearance on diffusion weighted imaging, they are likely 2/2 embolic shower iso recent CABG. Would recommend further imaging with CTA neck to complete stroke workup. As the patient underwent an intra-op TRAVON which did not reveal cardiac thrombus, additional cardiac imaging is not necessary from a stroke standpoint. Otherwise, regarding his antithrombotic regimen, he should remain on ASA from a stroke perspective. If there are cardiac reasons for DAPT or AC, this would be acceptable. Regarding additional secondary prevention, the patient's LDL  this admission is 32, so would continue PTA atorvastatin.  A1c last month was notably 8.6. Goal should be < 7.0 and he will need close PCP follow up for this.     If there are no significant new findings on CTA neck, stroke neurology will plan to sign off.     - CTA neck today  - Neurochecks and Vital Signs every 4 hours  - Continue Aspirin daily for secondary stroke prevention  - Telemetry, EKG  - Bedside Glucose Monitoring  - PT/OT/SLP  - Stroke Education  - Euthermia, Euglycemia    Stroke risk factors:  - Statin: atorvastatin 10mg daily. LDL goal < 70, 32 this admission  - A1c goal < 7.0, 8.6 recently. Will need close PCP follow up  - HTN: Goal < 130/80. Has been well controlled this admission and is on metoprolol    DVT PPX: SQH    Patient Follow-up    - Stroke clinic follow up 6-8 weeks after discharge      The patient was discussed with stroke staff, Dr. Hussein.    Adolfo Velez MD  Neurology Resident, PGY-2  05/03/2025 7:58 AM       Physical Examination     Temp: 98.7  F (37.1  C) Temp src: Oral BP: 121/78 Pulse: (!) 142   Resp: 16 SpO2: 98 % O2 Device: None (Room air)      Neurologic   Mental Status:  alert, oriented x 3, follows commands, speech clear and fluent  Cranial Nerves:  visual fields intact, EOMI with normal smooth pursuit, facial  sensation intact and symmetric, hearing not formally tested but intact to conversation, no dysarthria, face symmetric  Motor:  no abnormal movements, able to move all limbs spontaneously. Slight LUE pronator drift, but otherwise 5/5 strength throughout.   Reflexes:   deferred  Sensory:  light touch sensation intact and symmetric throughout upper and lower extremities, no extinction on double simultaneous stimulation   Coordination:  normal finger-to-nose without dysmetria with RUE but minimal ataxia noted with LUE which could be secondary to weakness.  Station/Gait:  deferred    Stroke Scales       NIHSS  1a. Level of Consciousness 0-->Alert, keenly responsive   1b. LOC Questions 0-->Answers both questions correctly   1c. LOC Commands 0-->Performs both tasks correctly   2.   Best Gaze 0-->Normal   3.   Visual 0-->No visual loss   4.   Facial Palsy 0-->Normal symmetrical movements   5a. Motor Arm, Left 1-->Drift, limb holds 90 (or 45) degrees, but drifts down before full 10 seconds, does not hit bed or other support   5b. Motor Arm, Right 0-->No drift, limb holds 90 (or 45) degrees for full 10 secs   6a. Motor Leg, Left 0-->No drift, leg holds 30 degree position for full 5 secs   6b. Motor Leg, right 0-->No drift, leg holds 30 degree position for full 5 secs   7.   Limb Ataxia 0-->Absent   8.   Sensory 0-->Normal, no sensory loss   9.   Best Language 0-->No aphasia, normal   10. Dysarthria 0-->Normal   11. Extinction and Inattention  0-->No abnormality   Total 1 (05/03/25 6127)       Imaging/Labs   (Bolded imaging and labs new and/or personally reviewed or re-reviewed by me today)    MRI/Head CT MRI brain:  RESIDENT PRELIMINARY INTERPRETATION  IMPRESSION:  1. Multifocal acute infarcts as described above, most prominent within the right corona radiata. Given the bilaterality and distribution, these are likely secondary to embolic source.  2. Moderate sequela of chronic small vessel ischemic disease.   Intracranial  Vasculature MRA Head:   RESIDENT PRELIMINARY INTERPRETATION  IMPRESSION: Mild focal stenosis within the left posterior cerebral artery P2 segments, otherwise no intracranial arterial aneurysm or stenosis.   Cervical Vasculature CTA Neck pending     Echocardiogram TTE:  Interpretation Summary  Technically difficult study.  Left ventricular size, wall motion and function are normal. The ejection fraction is 60-65%.  Pulmonary artery systolic pressure cannot be assessed.  The inferior vena cava is normal.  No pericardial effusion is present.  Compared to prior imaging the aortic root dilatin is not as well appreciated.   EKG/Telemetry Sinus rhythm with Premature atrial complexes   Nonspecific ST and T wave abnormality   Abnormal ECG      LDL  5/3/2025: 32 mg/dL   A1C  3/3/2025: 8.6 %   Troponin No lab value available in past 48 hrs     Other labs reviewed by me today:  Recent Results (from the past 24 hours)   Glucose by meter    Collection Time: 05/02/25  1:12 PM   Result Value Ref Range    GLUCOSE BY METER POCT 241 (H) 70 - 99 mg/dL   Glucose by meter    Collection Time: 05/02/25  6:06 PM   Result Value Ref Range    GLUCOSE BY METER POCT 151 (H) 70 - 99 mg/dL   MR Brain w/o & w Contrast    Collection Time: 05/02/25  9:22 PM   Result Value Ref Range    Radiologist flags Scattered bilateral acute infarcts (Urgent)    Glucose by meter    Collection Time: 05/02/25  9:46 PM   Result Value Ref Range    GLUCOSE BY METER POCT 148 (H) 70 - 99 mg/dL   Basic metabolic panel    Collection Time: 05/03/25  7:06 AM   Result Value Ref Range    Sodium 135 135 - 145 mmol/L    Potassium 4.1 3.4 - 5.3 mmol/L    Chloride 100 98 - 107 mmol/L    Carbon Dioxide (CO2) 24 22 - 29 mmol/L    Anion Gap 11 7 - 15 mmol/L    Urea Nitrogen 16.5 8.0 - 23.0 mg/dL    Creatinine 0.68 0.67 - 1.17 mg/dL    GFR Estimate >90 >60 mL/min/1.73m2    Calcium 8.0 (L) 8.8 - 10.4 mg/dL    Glucose 162 (H) 70 - 99 mg/dL   CBC with platelets    Collection Time:  05/03/25  7:06 AM   Result Value Ref Range    WBC Count 9.4 4.0 - 11.0 10e3/uL    RBC Count 2.45 (L) 4.40 - 5.90 10e6/uL    Hemoglobin 7.3 (L) 13.3 - 17.7 g/dL    Hematocrit 21.8 (L) 40.0 - 53.0 %    MCV 89 78 - 100 fL    MCH 29.8 26.5 - 33.0 pg    MCHC 33.5 31.5 - 36.5 g/dL    RDW 14.6 10.0 - 15.0 %    Platelet Count 186 150 - 450 10e3/uL   Magnesium    Collection Time: 05/03/25  7:06 AM   Result Value Ref Range    Magnesium 2.3 1.7 - 2.3 mg/dL   Phosphorus    Collection Time: 05/03/25  7:06 AM   Result Value Ref Range    Phosphorus 3.6 2.5 - 4.5 mg/dL   Lipid Profile    Collection Time: 05/03/25  7:06 AM   Result Value Ref Range    Cholesterol 79 <200 mg/dL    Triglycerides 122 <150 mg/dL    Direct Measure HDL 23 (L) >=40 mg/dL    LDL Cholesterol Calculated 32 <100 mg/dL    Non HDL Cholesterol 56 <130 mg/dL   Glucose by meter    Collection Time: 05/03/25  7:50 AM   Result Value Ref Range    GLUCOSE BY METER POCT 150 (H) 70 - 99 mg/dL

## 2025-05-03 NOTE — PROGRESS NOTES
Vascular Access Services Notes:     PICC/Midline to be placed today as ordered. Primary RN to assure that patient have had Chlorhexidine Gluconate (CHG) bath & linen change prior to procedure. RN to contact VAS once done.         Eulogio Torres, BRYANN, RN VA-BC  Vascular Access Services  [facility]  [phone]

## 2025-05-04 ENCOUNTER — APPOINTMENT (OUTPATIENT)
Dept: OCCUPATIONAL THERAPY | Facility: CLINIC | Age: 70
DRG: 235 | End: 2025-05-04
Attending: THORACIC SURGERY (CARDIOTHORACIC VASCULAR SURGERY)
Payer: COMMERCIAL

## 2025-05-04 ENCOUNTER — APPOINTMENT (OUTPATIENT)
Dept: PHYSICAL THERAPY | Facility: CLINIC | Age: 70
DRG: 235 | End: 2025-05-04
Attending: THORACIC SURGERY (CARDIOTHORACIC VASCULAR SURGERY)
Payer: COMMERCIAL

## 2025-05-04 LAB
ABO + RH BLD: NORMAL
ANION GAP SERPL CALCULATED.3IONS-SCNC: 9 MMOL/L (ref 7–15)
BLD GP AB SCN SERPL QL: NEGATIVE
BUN SERPL-MCNC: 15.6 MG/DL (ref 8–23)
CALCIUM SERPL-MCNC: 8 MG/DL (ref 8.8–10.4)
CHLORIDE SERPL-SCNC: 102 MMOL/L (ref 98–107)
CREAT SERPL-MCNC: 0.66 MG/DL (ref 0.67–1.17)
EGFRCR SERPLBLD CKD-EPI 2021: >90 ML/MIN/1.73M2
ERYTHROCYTE [DISTWIDTH] IN BLOOD BY AUTOMATED COUNT: 14.7 % (ref 10–15)
GLUCOSE BLDC GLUCOMTR-MCNC: 142 MG/DL (ref 70–99)
GLUCOSE BLDC GLUCOMTR-MCNC: 159 MG/DL (ref 70–99)
GLUCOSE BLDC GLUCOMTR-MCNC: 183 MG/DL (ref 70–99)
GLUCOSE BLDC GLUCOMTR-MCNC: 209 MG/DL (ref 70–99)
GLUCOSE SERPL-MCNC: 178 MG/DL (ref 70–99)
HCO3 SERPL-SCNC: 26 MMOL/L (ref 22–29)
HCT VFR BLD AUTO: 21.1 % (ref 40–53)
HGB BLD-MCNC: 6.9 G/DL (ref 13.3–17.7)
HGB BLD-MCNC: 7.7 G/DL (ref 13.3–17.7)
MAGNESIUM SERPL-MCNC: 2.2 MG/DL (ref 1.7–2.3)
MCH RBC QN AUTO: 28.6 PG (ref 26.5–33)
MCHC RBC AUTO-ENTMCNC: 32.7 G/DL (ref 31.5–36.5)
MCV RBC AUTO: 88 FL (ref 78–100)
PHOSPHATE SERPL-MCNC: 3.3 MG/DL (ref 2.5–4.5)
PLATELET # BLD AUTO: 202 10E3/UL (ref 150–450)
POTASSIUM SERPL-SCNC: 3.9 MMOL/L (ref 3.4–5.3)
RBC # BLD AUTO: 2.41 10E6/UL (ref 4.4–5.9)
SODIUM SERPL-SCNC: 137 MMOL/L (ref 135–145)
SPECIMEN EXP DATE BLD: NORMAL
WBC # BLD AUTO: 8.4 10E3/UL (ref 4–11)

## 2025-05-04 PROCEDURE — 97530 THERAPEUTIC ACTIVITIES: CPT | Mod: GO | Performed by: OCCUPATIONAL THERAPIST

## 2025-05-04 PROCEDURE — 84100 ASSAY OF PHOSPHORUS: CPT | Performed by: INTERNAL MEDICINE

## 2025-05-04 PROCEDURE — 120N000005 HC R&B MS OVERFLOW UMMC

## 2025-05-04 PROCEDURE — 250N000011 HC RX IP 250 OP 636: Mod: JZ | Performed by: PHYSICIAN ASSISTANT

## 2025-05-04 PROCEDURE — 82947 ASSAY GLUCOSE BLOOD QUANT: CPT | Performed by: NURSE PRACTITIONER

## 2025-05-04 PROCEDURE — 97116 GAIT TRAINING THERAPY: CPT | Mod: GP

## 2025-05-04 PROCEDURE — 93010 ELECTROCARDIOGRAM REPORT: CPT | Performed by: INTERNAL MEDICINE

## 2025-05-04 PROCEDURE — 86901 BLOOD TYPING SEROLOGIC RH(D): CPT | Performed by: INTERNAL MEDICINE

## 2025-05-04 PROCEDURE — 85027 COMPLETE CBC AUTOMATED: CPT | Performed by: NURSE PRACTITIONER

## 2025-05-04 PROCEDURE — 250N000013 HC RX MED GY IP 250 OP 250 PS 637: Performed by: SURGERY

## 2025-05-04 PROCEDURE — 83735 ASSAY OF MAGNESIUM: CPT | Performed by: NURSE PRACTITIONER

## 2025-05-04 PROCEDURE — 93005 ELECTROCARDIOGRAM TRACING: CPT

## 2025-05-04 PROCEDURE — 250N000011 HC RX IP 250 OP 636: Performed by: SURGERY

## 2025-05-04 PROCEDURE — 250N000013 HC RX MED GY IP 250 OP 250 PS 637: Performed by: PHYSICIAN ASSISTANT

## 2025-05-04 PROCEDURE — 250N000013 HC RX MED GY IP 250 OP 250 PS 637

## 2025-05-04 PROCEDURE — 85018 HEMOGLOBIN: CPT | Performed by: PHYSICIAN ASSISTANT

## 2025-05-04 PROCEDURE — 97535 SELF CARE MNGMENT TRAINING: CPT | Mod: GO | Performed by: OCCUPATIONAL THERAPIST

## 2025-05-04 PROCEDURE — 97530 THERAPEUTIC ACTIVITIES: CPT | Mod: GP

## 2025-05-04 RX ORDER — FUROSEMIDE 40 MG/1
40 TABLET ORAL ONCE
Status: COMPLETED | OUTPATIENT
Start: 2025-05-04 | End: 2025-05-04

## 2025-05-04 RX ORDER — AMIODARONE HYDROCHLORIDE 200 MG/1
400 TABLET ORAL 2 TIMES DAILY
Status: DISCONTINUED | OUTPATIENT
Start: 2025-05-04 | End: 2025-05-07 | Stop reason: HOSPADM

## 2025-05-04 RX ORDER — FUROSEMIDE 40 MG/1
40 TABLET ORAL ONCE
Status: DISCONTINUED | OUTPATIENT
Start: 2025-05-04 | End: 2025-05-04

## 2025-05-04 RX ORDER — POTASSIUM CHLORIDE 750 MG/1
20 TABLET, EXTENDED RELEASE ORAL ONCE
Status: COMPLETED | OUTPATIENT
Start: 2025-05-04 | End: 2025-05-04

## 2025-05-04 RX ORDER — POTASSIUM CHLORIDE 750 MG/1
40 TABLET, EXTENDED RELEASE ORAL ONCE
Status: COMPLETED | OUTPATIENT
Start: 2025-05-04 | End: 2025-05-04

## 2025-05-04 RX ORDER — POTASSIUM CHLORIDE 750 MG/1
20 TABLET, EXTENDED RELEASE ORAL ONCE
Status: DISCONTINUED | OUTPATIENT
Start: 2025-05-04 | End: 2025-05-04

## 2025-05-04 RX ADMIN — Medication 37.5 MG: at 20:23

## 2025-05-04 RX ADMIN — HEPARIN SODIUM 5000 UNITS: 5000 INJECTION, SOLUTION INTRAVENOUS; SUBCUTANEOUS at 20:24

## 2025-05-04 RX ADMIN — ASPIRIN 81 MG CHEWABLE TABLET 162 MG: 81 TABLET CHEWABLE at 07:52

## 2025-05-04 RX ADMIN — Medication 37.5 MG: at 07:52

## 2025-05-04 RX ADMIN — HEPARIN SODIUM 5000 UNITS: 5000 INJECTION, SOLUTION INTRAVENOUS; SUBCUTANEOUS at 04:16

## 2025-05-04 RX ADMIN — INSULIN ASPART 1 UNITS: 100 INJECTION, SOLUTION INTRAVENOUS; SUBCUTANEOUS at 12:41

## 2025-05-04 RX ADMIN — AMIODARONE HYDROCHLORIDE 0.5 MG/MIN: 1.8 INJECTION, SOLUTION INTRAVENOUS at 12:43

## 2025-05-04 RX ADMIN — INSULIN ASPART 1 UNITS: 100 INJECTION, SOLUTION INTRAVENOUS; SUBCUTANEOUS at 07:52

## 2025-05-04 RX ADMIN — INSULIN ASPART 2 UNITS: 100 INJECTION, SOLUTION INTRAVENOUS; SUBCUTANEOUS at 18:46

## 2025-05-04 RX ADMIN — ATORVASTATIN CALCIUM 10 MG: 10 TABLET, FILM COATED ORAL at 20:23

## 2025-05-04 RX ADMIN — METHOCARBAMOL 500 MG: 500 TABLET ORAL at 20:23

## 2025-05-04 RX ADMIN — FUROSEMIDE 40 MG: 40 TABLET ORAL at 16:25

## 2025-05-04 RX ADMIN — AMIODARONE HYDROCHLORIDE 400 MG: 200 TABLET ORAL at 20:23

## 2025-05-04 RX ADMIN — PANTOPRAZOLE SODIUM 40 MG: 40 TABLET, DELAYED RELEASE ORAL at 07:52

## 2025-05-04 RX ADMIN — FUROSEMIDE 40 MG: 40 TABLET ORAL at 08:22

## 2025-05-04 RX ADMIN — INSULIN GLARGINE 10 UNITS: 100 INJECTION, SOLUTION SUBCUTANEOUS at 07:51

## 2025-05-04 RX ADMIN — AMIODARONE HYDROCHLORIDE 0.5 MG/MIN: 1.8 INJECTION, SOLUTION INTRAVENOUS at 02:20

## 2025-05-04 RX ADMIN — POTASSIUM CHLORIDE 20 MEQ: 750 TABLET, EXTENDED RELEASE ORAL at 16:25

## 2025-05-04 RX ADMIN — METHOCARBAMOL 500 MG: 500 TABLET ORAL at 07:52

## 2025-05-04 RX ADMIN — POTASSIUM CHLORIDE 40 MEQ: 750 TABLET, EXTENDED RELEASE ORAL at 08:22

## 2025-05-04 RX ADMIN — ACETAMINOPHEN 975 MG: 325 TABLET ORAL at 20:24

## 2025-05-04 RX ADMIN — HEPARIN SODIUM 5000 UNITS: 5000 INJECTION, SOLUTION INTRAVENOUS; SUBCUTANEOUS at 12:35

## 2025-05-04 RX ADMIN — ACETAMINOPHEN 975 MG: 325 TABLET ORAL at 04:15

## 2025-05-04 RX ADMIN — ACETAMINOPHEN 975 MG: 325 TABLET ORAL at 12:35

## 2025-05-04 RX ADMIN — METHOCARBAMOL 500 MG: 500 TABLET ORAL at 12:42

## 2025-05-04 ASSESSMENT — ACTIVITIES OF DAILY LIVING (ADL)
ADLS_ACUITY_SCORE: 53
ADLS_ACUITY_SCORE: 53
ADLS_ACUITY_SCORE: 52
ADLS_ACUITY_SCORE: 54
ADLS_ACUITY_SCORE: 52
ADLS_ACUITY_SCORE: 53
ADLS_ACUITY_SCORE: 54
ADLS_ACUITY_SCORE: 52
ADLS_ACUITY_SCORE: 52
ADLS_ACUITY_SCORE: 53
ADLS_ACUITY_SCORE: 52
ADLS_ACUITY_SCORE: 54
ADLS_ACUITY_SCORE: 52
ADLS_ACUITY_SCORE: 54
ADLS_ACUITY_SCORE: 53
ADLS_ACUITY_SCORE: 52
ADLS_ACUITY_SCORE: 52
ADLS_ACUITY_SCORE: 53
ADLS_ACUITY_SCORE: 53

## 2025-05-04 NOTE — PLAN OF CARE
Hx: Multivessel CAD, dilated aortic root/ascending aorta, HTN, T2DM, obesity. He was asymptomatic, CAD and aortic aneurysm were incidental findings on CT calcium score.     S/P coronary artery bypass graft x 3, left endoscopic harvest of greater saphenous vein, left internal mammary harvest by Dr. Barnes on 4/29/25.     AO X 4  Sinus dys HR 80's; amio gtt through PICC  Room air, 96%  Last BM 5/3  Adequate uop  Good appetite  Up with assist of 1 / sba with GB and walker X 2  Denies pain  2 PIV and PICC

## 2025-05-04 NOTE — PROGRESS NOTES
6514 - Rohit William. Pt Hgb this morning was 6.9  Paged: Dr Michael [phone]  Waiting for a response

## 2025-05-04 NOTE — PLAN OF CARE
Hx: Multivessel CAD, dilated aortic root/ascending aorta, HTN, T2DM, obesity. He was asymptomatic, CAD and aortic aneurysm were incidental findings on CT calcium score.     S/P coronary artery bypass graft x 3, left endoscopic harvest of greater saphenous vein, left internal mammary harvest by Dr. Barnes on 4/29/25.     AO X 4  Sinus dys HR 80's; amio gtt through PICC  Room air, 96%  Last BM 5/3  Adequate uop  Good appetite  Up with assist of 1 / sba with GB and walker X 2  Denies pain  2 PIV and PICC    Hg 7.7. Hair washed, linen changed, teeth brushed, ANDRAE wipes used, face washed. Encouraging pulm hyg and ambulation. Stroke education added to AVS. Sister, spouse, and daughter visiting.

## 2025-05-04 NOTE — PLAN OF CARE
"D: Coronary artery disease  (primary encounter diagnosis)    I: Monitored vitals and assessed pt status.   Changed: Improved mentation and alertness  Running:Amiodarone@0.5mg/min  PRN:None  Neuro: A&Ox4, more interactive with clear mentation throughout the shift. Patient had an uneventful night, slept well during the night.   Cardiac: Sinus dysrhythmia, HR 70 - 80s, Afebrile, VSS.         Respiratory: Lungs sound clear, denies dyspnea, and Sat>95% on RA  GI/: Active bowel sound, passing flatus, and Voiding spontaneously. Primofit intact with good UO.   Diet/appetite: Tolerating regular diet. Denies nausea.  Activity: Up with assist x 2 with GB   Pain: Denies pain  Skin: No new deficits noted.  Lines: Piv x 1, SL\"D  No intake/output data recorded.    I/O this shift:  In: 50 [P.O.:50]  Out: -     Temp:  [97.6  F (36.4  C)-98.7  F (37.1  C)] 97.6  F (36.4  C)  Pulse:  [] 89  Resp:  [15-18] 15  BP: (110-138)/(68-86) 126/74  SpO2:  [94 %-98 %] 98 %      Goal Outcome Evaluation:      Plan of Care Reviewed With: patient    Overall Patient Progress: improvingOverall Patient Progress: improving    Outcome Evaluation: Sinus dysrhythmia, HR 70 - 80s, Patient remains alert and oriented x 4, more interractive with a clear mentation. Continues with Amiodarone drip running @ 0.5 mg/min      "

## 2025-05-04 NOTE — PROGRESS NOTES
Cardiovascular Surgery Progress Note  05/04/2025         Assessment and Plan:     Rohit William is a 70 year old male with PMH of multivessel CAD, dilated aortic root/ascending aorta, HTN, T2DM, obesity. He was asymptomatic, CAD and aortic aneurysm were incidental findings on CT calcium score.   S/P coronary artery bypass graft x 3, left endoscopic harvest of greater saphenous vein, left internal mammary harvest by Dr. Barnes on 4/29/25.      Cardiovascular:   Cardiogenic shock, resolved   CAD  Hx multivessel CAD s/p coronary artery bypass graft x3  Sinus tachy with PACs  HD stable, ST with -120. MAPS 70s-90s  Post op ECHO 5/1/25: EF 60 to 65%, normal IVC, no pericardial effusion  -  mg daily  - Atorvastatin 10 mg daily   - Metoprolol 37.5 mg BID  - Diuresis as below  - Post op echo 5/1/25 obtained due to sinus tachycardia, no acute findings.   Post-op Atrial fibrillation w RVR (140's)  - A-fib w RVR 5/3 am, given IV Metop 5 mg without conversion.   - IV Amiodarone infusion for 24 hours via PICC, transition to 400 mg PO BID on 5/4 evening.     Chest tubes: Removed in ICU   TPW: Removed in ICU      Pulmonary:  Post extubation respiratory insufficiency, improving   Pulmonary edema in the setting of hypervolemia   Extubated POD 0 to NC. Now saturating well on RA  - Supplemental O2 PRN to keep sats > 92%.  - Pulm hygiene, IS, activity and deep breathing.  - Diuresis as below.     Neurology / MSK:  Acute post-operative pain   Pain well controlled with current regimen:  - Acetaminophen, PO oxycodone PRN, methocarbamol  Left sided Weakness and/or Neglect  Somnolence   Acute multifocal infarcts, likely embolic  Chronic small vessel ischemic disease  - Family noting Rohit was very sleepy 5/2/25, more than previous 2 days. Not as talkative. Found also that Rozerem was added 5/1 overnight by resident, discontinued 5/3.   - PT staff noted LLE proprioception and strength concerns with walking 5/2, bumping things  with left leg.   - Neurology consult 5/2. Unclear timing of onset but seems progressive over past 24-36 hours.              - Brain MRI 5/2: acute multifocal infarcts, prominent in R corona radiata, likely embolic. Moderate chronic small vessel ischemic disease.               - Brain MRA 5/2: mild focal stenosis of P1 segment of Left Post cerebral artery   - Vertebral artery plaques with 40-50% of bilat V4 segments.              - Continue aspirin, statin, Neuro check q 4 hours              - Follow up with Neurology in 8 weeks.       / Renal / Fluid / Electrolytes:  Hypervolemia   BL creat ~ 0.8, most recent creatinine WNL, adequate UOP.   FLUID STATUS: Pre-op weight 220 lbs. CXR shows pulmonary edema   - Diuresis: 40 mg PO BID lasix. Goal net negative 1-2 L.   - Replete lytes per protocol  - Strict I/O, daily weights  - Avoid/limit nephrotoxins as able     GI / Nutrition:   - Tolerating regular diet  - Continue bowel regimen, + BM    - PPI   - Continue scheduled bowel regimen, discussed with bedside RN to give as needed MOM, gave lactulose 5/2.     Endocrine:  Stress induced hyperglycemia   Uncontrolled type II DM   Hgb A1c 8.6  - Initially managed on insulin drip postop, transitioned to Lantus 10 units and HIGH resistance sliding scale; goal BG <180 for optimal healing  - Hold PTA metformin.     Infectious Disease:  Stress induced leukocytosis  WBC WNL (down trending), remains afebrile, no signs or symptoms of infection  - Completed perioperative antibiotics  - Continue to monitor fever curve, CBC     Hematology:   Acute blood loss anemia and thrombocytopenia  Hgb 7.7, Plt WNL, no signs or symptoms of active bleeding  - CBC daily     Anticoagulation:   -  mg      MSK/Skin:  Sternotomy  Surgical incision  - Sternal precautions  - Incisional cares per protocol     Prophylaxis:   - Stress ulcer prophylaxis: Pantoprazole 40 mg daily for 30 days  - DVT prophylaxis: Subcutaneous heparin, SCD     Disposition:  "  - Transferred to  on 5/1/25  - Therapies recommending discharge to ARU, needs to have BM.  - Medically Ready for Discharge: anticipated 1-3 days (diuresis, activity)      Clinically Significant Risk Factors               # Hypoalbuminemia: Lowest albumin = 3.1 g/dL at 4/30/2025  3:09 AM, will monitor as appropriate               # DMII: A1C = 8.6 % (Ref range: <5.7 %) within past 6 months   # Obesity: Estimated body mass index is 31.09 kg/m  as calculated from the following:    Height as of this encounter: 1.854 m (6' 1\").    Weight as of this encounter: 106.9 kg (235 lb 10.8 oz).      # Financial/Environmental Concerns: none   # History of CABG: noted on surgical history       Discussed with Dr Barnes through written and/or verbal communication.      Bj Grullon PA-C  Cardiothoracic Surgery  Pager [phone]    1:54 PM   May 4, 2025        Interval History:     No overnight events.   States pain is well managed on current regimen. Slept well overnight.  Tolerating diet, is passing flatus, + BM. No nausea or vomiting.  Breathing well without complaints, still heavy cough.   Working with therapies and ambulating in halls with assistance.   Denies chest pain, palpitations, dizziness, syncopal symptoms, fevers, chills, myalgias, or sternal popping/clicking.         Physical Exam:   Blood pressure 113/81, pulse 84, temperature 97.8  F (36.6  C), temperature source Oral, resp. rate 16, height 1.854 m (6' 1\"), weight 106.9 kg (235 lb 10.8 oz), SpO2 99%.  Vitals:    04/30/25 0515 05/01/25 0230 05/02/25 0440   Weight: 106.6 kg (235 lb 0.2 oz) 106.1 kg (233 lb 14.4 oz) 106.9 kg (235 lb 10.8 oz)      Weight; + 15 lbs since admit and trending up.   24 hr Fluid status; net loss 186 mL. UOP 1.4 L  MAPs: 83 - 96     Gen: A&Ox4, NAD  Neuro: no focal deficits   CV: irregular, normal S1 S2, no murmurs, rubs or gallops.   Pulm: coarse bilaterally, normal breathing on RA  Abd: nondistended, normal BS, soft, nontender  Ext: " trace peripheral edema, 1+ pitting  Incision: clean, dry, intact, no erythema, sternum stable  Tubes/drain sites: dressing clean and dry         Data:    Imaging:  reviewed recent imaging, no acute concerns    Labs:  BMP  Recent Labs   Lab 05/04/25  1237 05/04/25  0718 05/04/25  0523 05/03/25  2148 05/03/25  0750 05/03/25  0706 05/02/25  1312 05/02/25  0642 05/01/25  0718 05/01/25  0650   NA  --   --  137  --   --  135  --  134*  --  135   POTASSIUM  --   --  3.9  --   --  4.1  --  4.1  --  4.6   CHLORIDE  --   --  102  --   --  100  --  100  --  103   MAK  --   --  8.0*  --   --  8.0*  --  8.1*  --  8.3*   CO2  --   --  26  --   --  24  --  25  --  25   BUN  --   --  15.6  --   --  16.5  --  17.3  --  16.1   CR  --   --  0.66*  --   --  0.68  --  0.74  --  0.84   * 159* 178* 163*   < > 162*   < > 167*   < > 191*    < > = values in this interval not displayed.     CBC  Recent Labs   Lab 05/04/25  1251 05/04/25  0523 05/03/25  0706 05/02/25  0642 05/01/25  0651   WBC  --  8.4 9.4 11.6* 15.5*   RBC  --  2.41* 2.45* 2.51* 2.74*   HGB 7.7* 6.9* 7.3* 7.6* 8.2*   HCT  --  21.1* 21.8* 22.1* 24.2*   MCV  --  88 89 88 88   MCH  --  28.6 29.8 30.3 29.9   MCHC  --  32.7 33.5 34.4 33.9   RDW  --  14.7 14.6 14.8 15.0   PLT  --  202 186 159 146*     INR  Recent Labs   Lab 04/29/25  1617 04/29/25  1356   INR 1.30* 1.43*      Hepatic Panel  Recent Labs   Lab 04/30/25  0309 04/30/25  0002 04/29/25  1617   AST 38 43 43   ALT 12 13 14   ALKPHOS 35* 38* 43   BILITOTAL 0.3 0.4 0.6   ALBUMIN 3.1* 3.2* 3.5     GLUCOSE:   Recent Labs   Lab 05/04/25  1237 05/04/25  0718 05/04/25  0523 05/03/25  2148 05/03/25  0750 05/03/25  0706   * 159* 178* 163* 150* 162*

## 2025-05-04 NOTE — PROGRESS NOTES
[facility]    Vascular Neurology Progress Note    Interval History     No acute overnight events. Patient endorses left arm and hand weakness improving. Denies new symptoms. Seen eating breakfast. We discussed MRI brain findings among other stroke workup lipid panel, A1c. Patient asked about long term prognosis and I talked to him about post-stroke recovery, the importance of participation in therapies as well as medication adherence.     Hospital Course     Chief complaint: left sided UE weakness    70 year old male with a past medical history of multivessel CAD, dilated aortic root/ascending aorta, HTN, T2DM, and obesity, was admitted for a 3vCABG procedure on 4/29/2025. Stroke team was consulted due to concerns of left sided weakness. Last known well: prior to the surgery on 4/29/2025. Initial exam notable for left sided pronator drift. MRI brain showed diffuse multifocal punctate strokes, including Right corona radiata.   The cause of these strokes is most likely post- cardiac surgery. TTE was non diagnostic but intra operative TRAVON showed no intracavitary clots. CDUS showed no hemodynamically significantly bilateral ICA stenosis.  CTA neck ruled out unstable plaque.     Assessment and Plan     # Multifocal ischemic strokes, cardioembolic most likely 2/2 recent cardiac procedure  # Left hemibody weakness, improving  70 year old male with a past medical history of multivessel CAD, dilated aortic root/ascending aorta, HTN, T2DM, and obesity admitted for a 3vCABG procedure on 4/29/2025. Stroke team consulted due to concerns of left sided weakness and visual-spatial perception issues few days after surgery. MRI brain demonstrated multifocal ischemic strokes throughout most vascular territories, including right corona radiata which most likely represent post cardiac surgery embolic phenomena. Underwent complete stroke workup for optimization of secondary stroke  prevention regimen.     - Neurochecks and Vital Signs every 4 hours  - Continue Aspirin daily for secondary stroke prevention indefinitely, ok for 162mg (dose was determined by primary team)  - Telemetry while inpatient  - Bedside Glucose Monitoring  - PT/OT/SLP for safe dispo planning  - Stroke Education ordered for you  - Euthermia, Euglycemia      Stroke risk factors:  - Statin: atorvastatin 10mg daily. LDL goal < 70, 32 this admission  - A1c goal < 7.0, 8.6 recently. Will need close PCP follow up  - HTN: Goal < 130/80. Has been well controlled this admission and is on metoprolol     DVT PPX: [facility]    Patient Follow-up    - Stroke clinic follow up 6-8 weeks after discharge (ordered for you)  -zio patch 14 day cardiac monitoring after discharge (ordered for you)     No further stroke inpatient workup at this time. Discharge recommendations are above. We will sign off now but please page/message me with questions.     The patient was discussed with stroke staff, Dr. Hussein.    Connie Aguiar MD    Physical Examination     Temp: 97.8  F (36.6  C) Temp src: Oral BP: 135/83 Pulse: 84   Resp: 16 SpO2: 98 % O2 Device: None (Room air)      Mental Status:  alert, oriented x 3, follows commands, speech clear and fluent. Naming intact. Repetition intact. Performed simple calculations accurately  Cranial Nerves:  visual fields intact, EOMI with normal smooth pursuit, facial sensation intact and symmetric, hearing not formally tested but intact to conversation, no dysarthria, right facial droop  Motor:  no abnormal movements, able to move all limbs spontaneously. trace LUE pronator drift, and L shoulder abduction 4+/5, wrist extension 4+/5, finger extension and finger flexor 4+/5 but otherwise 5/5 strength throughout.   Reflexes:   deferred  Sensory:  light touch sensation intact and symmetric throughout upper and lower extremities, no extinction on double simultaneous stimulation   Coordination:  normal finger-to-nose without  dysmetria in mitesh UE.   Station/Gait:  deferred    Stroke Scales       NIHSS  1a. Level of Consciousness 0-->Alert, keenly responsive   1b. LOC Questions 0-->Answers both questions correctly   1c. LOC Commands 0-->Performs both tasks correctly   2.   Best Gaze 0-->Normal   3.   Visual 0-->No visual loss   4.   Facial Palsy 1-->Minor paralysis (flattened nasolabial fold, asymmetry on smiling) (right facial droop)   5a. Motor Arm, Left 1-->Drift, limb holds 90 (or 45) degrees, but drifts down before full 10 seconds, does not hit bed or other support   5b. Motor Arm, Right 0-->No drift, limb holds 90 (or 45) degrees for full 10 secs   6a. Motor Leg, Left 0-->No drift, leg holds 30 degree position for full 5 secs   6b. Motor Leg, right 0-->No drift, leg holds 30 degree position for full 5 secs   7.   Limb Ataxia 0-->Absent   8.   Sensory 0-->Normal, no sensory loss   9.   Best Language 0-->No aphasia, normal   10. Dysarthria 0-->Normal   11. Extinction and Inattention  0-->No abnormality   Total 2 (05/04/25 0834)       Imaging/Labs        MRI/Head CT MR Brain 5/02/2025  IMPRESSION:  1. Multifocal acute infarcts as described above, most prominent within  the right corona radiata. Given the bilaterality and distribution,  these are likely secondary to embolic source.  2. Moderate sequela of chronic small vessel ischemic disease.      Intracranial Vasculature MRA Brain 5/02/2025  IMPRESSION: Mild focal stenosis within the P1 segment of the left  posterior cerebral artery, otherwise no intracranial arterial aneurysm  or stenosis.        Cervical Vasculature    CTA  neck 5/3/2025  IMPRESSION:  Vertebral artery wall plaques causing up to 40-50% narrowing of thebilateral V4 segments. No arterial emboli identified. The bilateral  internal carotid arteries are widely patent.    US Carotid bilateral 3/03/2025  IMPRESSION:  1. Arrythmia. Pulsus bisferiens waveforms consistent with aortic  stenosis.     2. RIGHT ICA: Less than 50%  diameter stenosis by grayscale imaging and  sonographic velocity criteria.     3. LEFT ICA: Less than 50% diameter stenosis by grayscale imaging and  sonographic velocity criteria.     Echocardiogram Cardiac CTA  IMPRESSION:  1.  Severe three-vessel coronary artery disease.   2.  Total Agatston score 3671 placing the patient in the 97th  percentile when compared to an age- and gender-matched control group.  3.  Please review the separate Radiology report for incidental  noncardiac findings.     TRAVON 4/29/2025  Right Ventricle:  Cavity size normal.     Thrombus not present.    Global   function normal.     Left Ventricle:  Cavity size normal.     Thrombus not present.   Global   Function normal.     Post Intervention Findings   Procedure(s) performed:  CABG.  Regional wall motion:. Surgeon(s) notified   of all postintervention findings: Yes (In real time).             Post Intervention comments: Unchanged biventricular function. No regional   wall motion changes seen   Unchanged valvular function.AI appeared worse immediately after separation   from bypass, improved subsequently   No clot in NEVA   No obvious dissection in ascending aorta   No pericardial or pleural effusions seen    EKG/Telemetry EKG with SR     LDL  5/3/2025: 32 mg/dL   A1C  3/3/2025: 8.6 %   Troponin No lab value available in past 48 hrs     Other labs reviewed by me today:  Recent Results (from the past 24 hours)   Glucose by meter    Collection Time: 05/03/25  9:48 PM   Result Value Ref Range    GLUCOSE BY METER POCT 163 (H) 70 - 99 mg/dL   Basic metabolic panel    Collection Time: 05/04/25  5:23 AM   Result Value Ref Range    Sodium 137 135 - 145 mmol/L    Potassium 3.9 3.4 - 5.3 mmol/L    Chloride 102 98 - 107 mmol/L    Carbon Dioxide (CO2) 26 22 - 29 mmol/L    Anion Gap 9 7 - 15 mmol/L    Urea Nitrogen 15.6 8.0 - 23.0 mg/dL    Creatinine 0.66 (L) 0.67 - 1.17 mg/dL    GFR Estimate >90 >60 mL/min/1.73m2    Calcium 8.0 (L) 8.8 - 10.4 mg/dL     Glucose 178 (H) 70 - 99 mg/dL   CBC with platelets    Collection Time: 05/04/25  5:23 AM   Result Value Ref Range    WBC Count 8.4 4.0 - 11.0 10e3/uL    RBC Count 2.41 (L) 4.40 - 5.90 10e6/uL    Hemoglobin 6.9 (LL) 13.3 - 17.7 g/dL    Hematocrit 21.1 (L) 40.0 - 53.0 %    MCV 88 78 - 100 fL    MCH 28.6 26.5 - 33.0 pg    MCHC 32.7 31.5 - 36.5 g/dL    RDW 14.7 10.0 - 15.0 %    Platelet Count 202 150 - 450 10e3/uL   Magnesium    Collection Time: 05/04/25  5:23 AM   Result Value Ref Range    Magnesium 2.2 1.7 - 2.3 mg/dL   Phosphorus    Collection Time: 05/04/25  5:23 AM   Result Value Ref Range    Phosphorus 3.3 2.5 - 4.5 mg/dL   Adult Type and Screen    Collection Time: 05/04/25  5:23 AM   Result Value Ref Range    ABO/RH(D) A NEG     SPECIMEN EXPIRATION DATE [id number]    Glucose by meter    Collection Time: 05/04/25  7:18 AM   Result Value Ref Range    GLUCOSE BY METER POCT 159 (H) 70 - 99 mg/dL

## 2025-05-05 LAB
ANION GAP SERPL CALCULATED.3IONS-SCNC: 9 MMOL/L (ref 7–15)
ATRIAL RATE - MUSE: 79 BPM
BUN SERPL-MCNC: 17.7 MG/DL (ref 8–23)
CALCIUM SERPL-MCNC: 8.1 MG/DL (ref 8.8–10.4)
CHLORIDE SERPL-SCNC: 103 MMOL/L (ref 98–107)
CREAT SERPL-MCNC: 0.73 MG/DL (ref 0.67–1.17)
DIASTOLIC BLOOD PRESSURE - MUSE: NORMAL MMHG
EGFRCR SERPLBLD CKD-EPI 2021: >90 ML/MIN/1.73M2
ERYTHROCYTE [DISTWIDTH] IN BLOOD BY AUTOMATED COUNT: 14.9 % (ref 10–15)
GLUCOSE BLDC GLUCOMTR-MCNC: 168 MG/DL (ref 70–99)
GLUCOSE BLDC GLUCOMTR-MCNC: 170 MG/DL (ref 70–99)
GLUCOSE BLDC GLUCOMTR-MCNC: 170 MG/DL (ref 70–99)
GLUCOSE SERPL-MCNC: 180 MG/DL (ref 70–99)
HCO3 SERPL-SCNC: 25 MMOL/L (ref 22–29)
HCT VFR BLD AUTO: 22.4 % (ref 40–53)
HGB BLD-MCNC: 7.4 G/DL (ref 13.3–17.7)
INTERPRETATION ECG - MUSE: NORMAL
MAGNESIUM SERPL-MCNC: 2.1 MG/DL (ref 1.7–2.3)
MCH RBC QN AUTO: 29.1 PG (ref 26.5–33)
MCHC RBC AUTO-ENTMCNC: 33 G/DL (ref 31.5–36.5)
MCV RBC AUTO: 88 FL (ref 78–100)
P AXIS - MUSE: 30 DEGREES
PHOSPHATE SERPL-MCNC: 3.5 MG/DL (ref 2.5–4.5)
PLATELET # BLD AUTO: 241 10E3/UL (ref 150–450)
POTASSIUM SERPL-SCNC: 4.1 MMOL/L (ref 3.4–5.3)
PR INTERVAL - MUSE: 158 MS
QRS DURATION - MUSE: 80 MS
QT - MUSE: 388 MS
QTC - MUSE: 444 MS
R AXIS - MUSE: 6 DEGREES
RBC # BLD AUTO: 2.54 10E6/UL (ref 4.4–5.9)
SODIUM SERPL-SCNC: 137 MMOL/L (ref 135–145)
SYSTOLIC BLOOD PRESSURE - MUSE: NORMAL MMHG
T AXIS - MUSE: 34 DEGREES
VENTRICULAR RATE- MUSE: 79 BPM
WBC # BLD AUTO: 9.4 10E3/UL (ref 4–11)

## 2025-05-05 PROCEDURE — 250N000011 HC RX IP 250 OP 636: Performed by: SURGERY

## 2025-05-05 PROCEDURE — 250N000011 HC RX IP 250 OP 636: Performed by: PHYSICIAN ASSISTANT

## 2025-05-05 PROCEDURE — 93010 ELECTROCARDIOGRAM REPORT: CPT | Performed by: INTERNAL MEDICINE

## 2025-05-05 PROCEDURE — 93005 ELECTROCARDIOGRAM TRACING: CPT

## 2025-05-05 PROCEDURE — 82310 ASSAY OF CALCIUM: CPT | Performed by: NURSE PRACTITIONER

## 2025-05-05 PROCEDURE — 120N000005 HC R&B MS OVERFLOW UMMC

## 2025-05-05 PROCEDURE — 250N000013 HC RX MED GY IP 250 OP 250 PS 637: Performed by: PHYSICIAN ASSISTANT

## 2025-05-05 PROCEDURE — 97750 PHYSICAL PERFORMANCE TEST: CPT | Mod: GP

## 2025-05-05 PROCEDURE — 250N000013 HC RX MED GY IP 250 OP 250 PS 637: Performed by: NURSE PRACTITIONER

## 2025-05-05 PROCEDURE — 250N000013 HC RX MED GY IP 250 OP 250 PS 637

## 2025-05-05 PROCEDURE — 85014 HEMATOCRIT: CPT | Performed by: NURSE PRACTITIONER

## 2025-05-05 PROCEDURE — 97116 GAIT TRAINING THERAPY: CPT | Mod: GP

## 2025-05-05 PROCEDURE — 83735 ASSAY OF MAGNESIUM: CPT | Performed by: NURSE PRACTITIONER

## 2025-05-05 PROCEDURE — 250N000013 HC RX MED GY IP 250 OP 250 PS 637: Performed by: SURGERY

## 2025-05-05 PROCEDURE — 84100 ASSAY OF PHOSPHORUS: CPT | Performed by: THORACIC SURGERY (CARDIOTHORACIC VASCULAR SURGERY)

## 2025-05-05 RX ADMIN — PANTOPRAZOLE SODIUM 40 MG: 40 TABLET, DELAYED RELEASE ORAL at 08:10

## 2025-05-05 RX ADMIN — AMIODARONE HYDROCHLORIDE 400 MG: 200 TABLET ORAL at 08:10

## 2025-05-05 RX ADMIN — INSULIN ASPART 2 UNITS: 100 INJECTION, SOLUTION INTRAVENOUS; SUBCUTANEOUS at 08:11

## 2025-05-05 RX ADMIN — METHOCARBAMOL 500 MG: 500 TABLET ORAL at 20:18

## 2025-05-05 RX ADMIN — ACETAMINOPHEN 975 MG: 325 TABLET ORAL at 04:55

## 2025-05-05 RX ADMIN — Medication 37.5 MG: at 20:19

## 2025-05-05 RX ADMIN — INSULIN ASPART 2 UNITS: 100 INJECTION, SOLUTION INTRAVENOUS; SUBCUTANEOUS at 12:58

## 2025-05-05 RX ADMIN — ACETAMINOPHEN 975 MG: 325 TABLET ORAL at 12:58

## 2025-05-05 RX ADMIN — INSULIN GLARGINE 10 UNITS: 100 INJECTION, SOLUTION SUBCUTANEOUS at 08:10

## 2025-05-05 RX ADMIN — Medication 37.5 MG: at 08:10

## 2025-05-05 RX ADMIN — Medication 10 ML: at 15:39

## 2025-05-05 RX ADMIN — OXYCODONE HYDROCHLORIDE 5 MG: 5 TABLET ORAL at 06:13

## 2025-05-05 RX ADMIN — METHOCARBAMOL 500 MG: 500 TABLET ORAL at 13:00

## 2025-05-05 RX ADMIN — METHOCARBAMOL 500 MG: 500 TABLET ORAL at 08:10

## 2025-05-05 RX ADMIN — SENNOSIDES AND DOCUSATE SODIUM 2 TABLET: 50; 8.6 TABLET ORAL at 20:18

## 2025-05-05 RX ADMIN — OXYCODONE HYDROCHLORIDE 5 MG: 5 TABLET ORAL at 20:19

## 2025-05-05 RX ADMIN — HEPARIN SODIUM 5000 UNITS: 5000 INJECTION, SOLUTION INTRAVENOUS; SUBCUTANEOUS at 20:20

## 2025-05-05 RX ADMIN — ASPIRIN 81 MG CHEWABLE TABLET 162 MG: 81 TABLET CHEWABLE at 08:10

## 2025-05-05 RX ADMIN — AMIODARONE HYDROCHLORIDE 400 MG: 200 TABLET ORAL at 20:17

## 2025-05-05 RX ADMIN — HEPARIN SODIUM 5000 UNITS: 5000 INJECTION, SOLUTION INTRAVENOUS; SUBCUTANEOUS at 12:58

## 2025-05-05 RX ADMIN — ACETAMINOPHEN 975 MG: 325 TABLET ORAL at 20:17

## 2025-05-05 RX ADMIN — ATORVASTATIN CALCIUM 10 MG: 10 TABLET, FILM COATED ORAL at 20:19

## 2025-05-05 RX ADMIN — HEPARIN SODIUM 5000 UNITS: 5000 INJECTION, SOLUTION INTRAVENOUS; SUBCUTANEOUS at 04:57

## 2025-05-05 ASSESSMENT — ACTIVITIES OF DAILY LIVING (ADL)
ADLS_ACUITY_SCORE: 52
ADLS_ACUITY_SCORE: 54
ADLS_ACUITY_SCORE: 48
ADLS_ACUITY_SCORE: 54
ADLS_ACUITY_SCORE: 48
ADLS_ACUITY_SCORE: 52
ADLS_ACUITY_SCORE: 52
ADLS_ACUITY_SCORE: 48
ADLS_ACUITY_SCORE: 52
ADLS_ACUITY_SCORE: 54
ADLS_ACUITY_SCORE: 48
ADLS_ACUITY_SCORE: 45
ADLS_ACUITY_SCORE: 48
ADLS_ACUITY_SCORE: 54
ADLS_ACUITY_SCORE: 52
ADLS_ACUITY_SCORE: 54
ADLS_ACUITY_SCORE: 54
ADLS_ACUITY_SCORE: 52
ADLS_ACUITY_SCORE: 45
ADLS_ACUITY_SCORE: 54
ADLS_ACUITY_SCORE: 48
ADLS_ACUITY_SCORE: 52
ADLS_ACUITY_SCORE: 54

## 2025-05-05 NOTE — PROGRESS NOTES
Dynamic Gait Index (DGI):The DGI is a measure of balance during gait that is reliable and valid for the elderly and individuals with Parkinson's disease, MS, vestibular disorders, or s/p stroke.   Gait assistive device used: FWW    Patient score: 14/24  Scores <=19/24 indicate an increased risk for falls according to Daisy et al 2000  Minimal Detectable Change = 2.9 in community dwelling elderly according to Marky et al 2011    Assessment (rationale for performing, application to patient s function & care plan): Pt had new stroke while IP; increased risk for falls secondary to this. Pt has been using FWW since CABG procedure and did not use AD at baseline. Pt demo difficulty with pivot turn, wide turn radius, increased time to complete and v/c needed for full turn. Pt with impaired L foot clearance intermittently - needed to slow for obstacle negotiation and did step onto a box x2. Score indicates that pt is at an increased risk for falls.     Minutes billed as physical performance test: 8 min       05/05/25 1032   Signing Clinician's Name / Credentials   Signing clinician's name / credentials Tressa Tejada DPT   Dynamic Gait Index (Tomás and Mccoy Ashley, 1995)   Gait Level Surface 2   Change in Gait Speed 2   Gait and Horizontal Head Turns 2   Gait with Vertical Head Turns 2   Gait and Pivot Turns 1   Step Over Obstacle 2   Step Around Obstacles 2   Steps 1   Total Dynamic Gait Index Score  (A score of 19 or less has been correlated to an increased risk of falls in community dwelling older adults, patients with vestibular disorders, and patients with MS.)   Total Score (out of 24) 14

## 2025-05-05 NOTE — PLAN OF CARE
"  Problem: Delirium  Goal: Optimal Coping  Outcome: Progressing  Goal: Improved Behavioral Control  Outcome: Progressing  Intervention: Minimize Safety Risk  Recent Flowsheet Documentation  Taken 5/5/2025 0455 by Isreal Awan RN  Enhanced Safety Measures: room near unit station  Trust Relationship/Rapport:   care explained   choices provided   questions answered   thoughts/feelings acknowledged  Taken 5/4/2025 2023 by Isreal Awan, RN  Enhanced Safety Measures: room near unit station  Trust Relationship/Rapport:   care explained   choices provided   questions answered   thoughts/feelings acknowledged  Goal: Improved Attention and Thought Clarity  Outcome: Progressing  Goal: Improved Sleep  Outcome: Progressing     Problem: Adult Inpatient Plan of Care  Goal: Plan of Care Review  Description: The Plan of Care Review/Shift note should be completed every shift.  The Outcome Evaluation is a brief statement about your assessment that the patient is improving, declining, or no change.  This information will be displayed automatically on your shiftnote.  Outcome: Progressing  Goal: Patient-Specific Goal (Individualized)  Description: You can add care plan individualizations to a care plan. Examples of Individualization might be:  \"Parent requests to be called daily at 9am for status\", \"I have a hard time hearing out of my right ear\", or \"Do not touch me to wake me up as it startlesme\".  Outcome: Progressing  Goal: Absence of Hospital-Acquired Illness or Injury  Outcome: Progressing  Intervention: Identify and Manage Fall Risk  Recent Flowsheet Documentation  Taken 5/5/2025 0455 by Isreal Awan, RN  Safety Promotion/Fall Prevention:   lighting adjusted   clutter free environment maintained   activity supervised   nonskid shoes/slippers when out of bed   mobility aid in reach   patient and family education  Taken 5/4/2025 2023 by Isreal Awan, RN  Safety Promotion/Fall Prevention:   lighting adjusted   clutter " free environment maintained   activity supervised   nonskid shoes/slippers when out of bed   mobility aid in reach   patient and family education  Intervention: Prevent Skin Injury  Recent Flowsheet Documentation  Taken 5/5/2025 0455 by Isreal Awan RN  Body Position: position changed independently  Taken 5/4/2025 2023 by Isreal Awan RN  Body Position: position changed independently  Goal: Optimal Comfort and Wellbeing  Outcome: Progressing  Intervention: Monitor Pain and Promote Comfort  Recent Flowsheet Documentation  Taken 5/5/2025 0455 by Isreal Awan RN  Pain Management Interventions: medication (see MAR)  Intervention: Provide Person-Centered Care  Recent Flowsheet Documentation  Taken 5/5/2025 0455 by Isreal Awan RN  Trust Relationship/Rapport:   care explained   choices provided   questions answered   thoughts/feelings acknowledged  Taken 5/4/2025 2023 by Isreal Awan RN  Trust Relationship/Rapport:   care explained   choices provided   questions answered   thoughts/feelings acknowledged  Goal: Readiness for Transition of Care  Outcome: Progressing     Problem: Skin Injury Risk Increased  Goal: Skin Health and Integrity  Outcome: Progressing  Intervention: Plan: Nurse Driven Intervention: Moisture Management  Recent Flowsheet Documentation  Taken 5/5/2025 0455 by Isreal Awan RN  Moisture Interventions:   Encourage regular toileting   No brief in bed   Incontinence pad  Taken 5/4/2025 2023 by Isreal Awan RN  Moisture Interventions:   Encourage regular toileting   No brief in bed   Incontinence pad  Taken 5/4/2025 2000 by Isreal Awan RN  Moisture Interventions:   Encourage regular toileting   No brief in bed   Incontinence pad  Bathing/Skin Care: linen changed  Intervention: Plan: Nurse Driven Intervention: Friction and Shear  Recent Flowsheet Documentation  Taken 5/5/2025 0455 by Isrela Awan RN  Friction/Shear Interventions: HOB 30 degrees or less  Taken 5/4/2025 2023  by Isreal Awan, RN  Friction/Shear Interventions: HOB 30 degrees or less  Intervention: Optimize Skin Protection  Recent Flowsheet Documentation  Taken 5/5/2025 0455 by Isreal Awan, RN  Activity Management:   activity adjusted per tolerance   activity encouraged  Head of Bed (HOB) Positioning: HOB at 20-30 degrees  Taken 5/4/2025 2023 by Isreal Awan, RN  Activity Management:   activity adjusted per tolerance   activity encouraged  Head of Bed (HOB) Positioning: HOB at 20-30 degrees   Goal Outcome Evaluation:         Slept well overnight, scheduled tylenol helping with incisional pain, incontinence overnight

## 2025-05-05 NOTE — PLAN OF CARE
"I: Monitored vitals and assessed pt status. [id number]    PRN Med: Oxycodone x1     Vitals: /76 (BP Location: Left arm)   Pulse 91   Temp 98.8  F (37.1  C) (Oral)   Resp 16   Ht 1.854 m (6' 1\")   Wt 105.6 kg (232 lb 12.8 oz)   SpO2 97%   BMI 30.71 kg/m           A:   Neuro: A&O x4. Forgetful at times. Denies headache, dizziness, lightheadedness, numbness or tingling. Able to make needs known.  Cardiac: SR/Sinus dysrhythmia; HR 80's-90's. Afebrile, VSS. Denies chest pain.   Respiratory: Sating >92% on RA. Denies SOB. LS clear, diminished in the bases.   Diet/appetite: Tolerating regular diet. Good appetite.   Endocrine: BS check ACHS. Pt on sliding scale insulin.  GI/:  Last BM 5/4/25. Denies abdominal pain. Voids spontaneously, adequate urine output.   Activity:  SBA  Pain: Mild to moderate incisional chest pain. Pain regimen providing some relief.  Skin: No new deficits noted. R DL PICC, hep lock. R and L PIVs, SL.    Plan: Continue with plan of care and notify team of changes.    Goal Outcome Evaluation:      Plan of Care Reviewed With: patient    Overall Patient Progress: no changeOverall Patient Progress: no change    Outcome Evaluation: A&Ox4. VSS on RA. Sinus dysrhythmia, HR 80's-90's. Mild to moderate pain at chest incision site.      "

## 2025-05-05 NOTE — PROGRESS NOTES
Cardiovascular Surgery Progress Note  05/05/2025         Assessment and Plan:     Rohit William is a 70 year old male with PMH of multivessel CAD, dilated aortic root/ascending aorta, HTN, T2DM, obesity. He was asymptomatic, CAD and aortic aneurysm were incidental findings on CT calcium score.   S/P coronary artery bypass graft x 3, left endoscopic harvest of greater saphenous vein, left internal mammary harvest by Dr. Barnes on 4/29/25.      Cardiovascular:   Cardiogenic shock, resolved   CAD  Hx multivessel CAD s/p coronary artery bypass graft x3 (Vein to RPDA, vein to Ramus, vein to LAD)  Sinus tachy with PACs  HD stable, ST with -120. MAPS 70s-90s  Post op ECHO 5/1/25: EF 60 to 65%, normal IVC, no pericardial effusion  -  mg daily  - Atorvastatin 10 mg daily   - Metoprolol 37.5 mg BID  - Diuresis as below  - Post op echo 5/1/25 obtained due to sinus tachycardia, no acute findings.   Post-op Atrial fibrillation w RVR (140's)  - A-fib w RVR 5/3 am, given IV Metop 5 mg without conversion.   - IV Amiodarone infusion for 24 hours via PICC, transition to 400 mg PO BID on 5/4 evening.     Chest tubes: Removed in ICU   TPW: Removed in ICU      Pulmonary:  Post extubation respiratory insufficiency, improving   Pulmonary edema in the setting of hypervolemia   Extubated POD 0 to NC. Now saturating well on RA  - Supplemental O2 PRN to keep sats > 92%.  - Pulm hygiene, IS, activity and deep breathing.  - Diuresis as below.     Neurology / MSK:  Acute post-operative pain   Pain well controlled with current regimen:  - Acetaminophen, PO oxycodone PRN, methocarbamol  Left sided Weakness and/or Neglect  Somnolence   Acute multifocal infarcts, likely embolic  Chronic small vessel ischemic disease  - Family noting Rohit was very sleepy 5/2/25, more than previous 2 days. Not as talkative. Found also that Rozerem was added 5/1 overnight by resident, discontinued 5/3.   - PT staff noted LLE proprioception and strength  concerns with walking 5/2, bumping things with left leg.   - Neurology consult 5/2. Unclear timing of onset but seems progressive over past 24-36 hours.              - Brain MRI 5/2: acute multifocal infarcts, prominent in R corona radiata, likely embolic. Moderate chronic small vessel ischemic disease.               - Brain MRA 5/2: mild focal stenosis of P1 segment of Left Post cerebral artery              - Vertebral artery plaques with 40-50% of bilat V4 segments.              - Continue aspirin, statin, Neuro check q 4 hours              - Follow up with Neurology in 8 weeks.       / Renal / Fluid / Electrolytes:  Hypervolemia   BL creat ~ 0.8, most recent creatinine 0.73 WNL, adequate UOP.   FLUID STATUS: Pre-op weight 220 lbs.  - Diuresis: 40 mg PO BID lasix. Goal net negative 1-2 L.   - Replete lytes per protocol  - Strict I/O, daily weights  - Avoid/limit nephrotoxins as able     GI / Nutrition:   - Tolerating regular diet  - Continue bowel regimen, + BM    - PPI   - Continue scheduled bowel regimen, lactulose given 5/2, + BM 5/4     Endocrine:  Stress induced hyperglycemia   Uncontrolled type II DM   Hgb A1c 8.6  - Initially managed on insulin drip postop, transitioned to Lantus 10 units and HIGH resistance sliding scale; goal BG <180 for optimal healing  - Hold PTA metformin.     Infectious Disease:  Stress induced leukocytosis  WBC WNL (down trending), remains afebrile, no signs or symptoms of infection  - Completed perioperative antibiotics  - Continue to monitor fever curve, CBC     Hematology:   Acute blood loss anemia and thrombocytopenia  Hgb 7.4, Plt WNL, no signs or symptoms of active bleeding  - CBC daily     Anticoagulation:   -  mg      MSK/Skin:  Sternotomy  Surgical incision  - Sternal precautions  - Incisional cares per protocol     Prophylaxis:   - Stress ulcer prophylaxis: Pantoprazole 40 mg daily for 30 days  - DVT prophylaxis: Subcutaneous heparin, SCD     Disposition:   -  "Transferred to 6C on 5/1/25  - Therapies recommending discharge to ARU  - Medically Ready for Discharge: medically ready for discharge since 5/5. Pending ARU placement and bed availability    Clinically Significant Risk Factors               # Hypoalbuminemia: Lowest albumin = 3.1 g/dL at 4/30/2025  3:09 AM, will monitor as appropriate               # DMII: A1C = 8.6 % (Ref range: <5.7 %) within past 6 months   # Obesity: Estimated body mass index is 30.71 kg/m  as calculated from the following:    Height as of this encounter: 1.854 m (6' 1\").    Weight as of this encounter: 105.6 kg (232 lb 12.8 oz).      # Financial/Environmental Concerns: none   # History of CABG: noted on surgical history       Discussed with Dr Barnes through written and/or verbal communication.      Alicia BOOKER   I have seen and examined this patient with the PA student, I agree with the above findings.  Patient discussed with staff.       Bj Grullon PA-C  Cardiothoracic Surgery  Pager [phone]    10:34 AM   May 5, 2025        Interval History:     No overnight events. Endorses some incisional pain that is well controlled with Tylenol. Slept well overnight. Tolerating diet, is passing flatus, +BM 5/4 after Lactulose/aggressive bowel regimen. No nausea or vomiting.    Breathing well without complaints.     Working with therapies and ambulating in halls without assistance.     Denies chest pain, palpitations, dizziness, syncopal symptoms, fevers, chills, myalgias, or sternal popping/clicking.         Physical Exam:   Blood pressure 132/79, pulse 83, temperature 98  F (36.7  C), temperature source Oral, resp. rate 16, height 1.854 m (6' 1\"), weight 105.6 kg (232 lb 12.8 oz), SpO2 99%.  Vitals:    05/01/25 0230 05/02/25 0440 05/05/25 0455   Weight: 106.1 kg (233 lb 14.4 oz) 106.9 kg (235 lb 10.8 oz) 105.6 kg (232 lb 12.8 oz)      Weight; + 12 lbs since admit and trending down.   24 hr Fluid status; net loss 1 L. UOP 2.3 L  MAPs: " 90-100s    Gen: A&Ox4, NAD  Neuro: no focal deficits   CV: RRR, normal S1 S2, no murmurs, rubs or gallops. No JVD  Pulm: CTA, no wheezing or rhonchi, normal breathing on RA  Abd: nondistended, normal BS, soft, nontender  Ext: mild peripheral edema, 1+ pitting edema   Incision: clean, dry, intact, no erythema, sternum stable  Tubes/drain sites: Epstein catheter draining clear straw urine into bag. No clots.          Data:    Imaging: No new imaging    Labs:  BMP  Recent Labs   Lab 05/05/25  0844 05/05/25  0504 05/04/25  2131 05/04/25  1723 05/04/25  0718 05/04/25  0523 05/03/25  0750 05/03/25  0706 05/02/25  1312 05/02/25  0642   NA  --  137  --   --   --  137  --  135  --  134*   POTASSIUM  --  4.1  --   --   --  3.9  --  4.1  --  4.1   CHLORIDE  --  103  --   --   --  102  --  100  --  100   MAK  --  8.1*  --   --   --  8.0*  --  8.0*  --  8.1*   CO2  --  25  --   --   --  26  --  24  --  25   BUN  --  17.7  --   --   --  15.6  --  16.5  --  17.3   CR  --  0.73  --   --   --  0.66*  --  0.68  --  0.74   * 180* 209* 183*   < > 178*   < > 162*   < > 167*    < > = values in this interval not displayed.     CBC  Recent Labs   Lab 05/05/25  0504 05/04/25  1251 05/04/25  0523 05/03/25  0706 05/02/25  0642   WBC 9.4  --  8.4 9.4 11.6*   RBC 2.54*  --  2.41* 2.45* 2.51*   HGB 7.4* 7.7* 6.9* 7.3* 7.6*   HCT 22.4*  --  21.1* 21.8* 22.1*   MCV 88  --  88 89 88   MCH 29.1  --  28.6 29.8 30.3   MCHC 33.0  --  32.7 33.5 34.4   RDW 14.9  --  14.7 14.6 14.8     --  202 186 159     INR  Recent Labs   Lab 04/29/25  1617 04/29/25  1356   INR 1.30* 1.43*      Hepatic Panel  Recent Labs   Lab 04/30/25  0309 04/30/25  0002 04/29/25  1617   AST 38 43 43   ALT 12 13 14   ALKPHOS 35* 38* 43   BILITOTAL 0.3 0.4 0.6   ALBUMIN 3.1* 3.2* 3.5     GLUCOSE:   Recent Labs   Lab 05/05/25  0844 05/05/25  0504 05/04/25  2131 05/04/25  1723 05/04/25  1237 05/04/25  0718   * 180* 209* 183* 142* 159*

## 2025-05-05 NOTE — PLAN OF CARE
Hx: Multivessel CAD, dilated aortic root/ascending aorta, HTN, T2DM, obesity. He was asymptomatic, CAD and aortic aneurysm were incidental findings on CT calcium score.      S/P coronary artery bypass graft x 3, left endoscopic harvest of greater saphenous vein, left internal mammary harvest by Dr. Barnes on 4/29/25.      AO X 4  Sinus dys HR 80's; amio oral  Room air, 96%  Last BM 5/4  Adequate uop  Good appetite  Up with assist of 1 / sba with GB and walker X 2  Denies pain  2 PIV and PICC    Ambulation and pul hyg encouraged. Patient given literature on TIA symptoms, causes, care.     Plan: ARU v TCU.

## 2025-05-06 LAB
ANION GAP SERPL CALCULATED.3IONS-SCNC: 9 MMOL/L (ref 7–15)
ATRIAL RATE - MUSE: 125 BPM
ATRIAL RATE - MUSE: 97 BPM
BUN SERPL-MCNC: 18.3 MG/DL (ref 8–23)
CALCIUM SERPL-MCNC: 8.4 MG/DL (ref 8.8–10.4)
CHLORIDE SERPL-SCNC: 103 MMOL/L (ref 98–107)
CREAT SERPL-MCNC: 0.72 MG/DL (ref 0.67–1.17)
DIASTOLIC BLOOD PRESSURE - MUSE: NORMAL MMHG
DIASTOLIC BLOOD PRESSURE - MUSE: NORMAL MMHG
EGFRCR SERPLBLD CKD-EPI 2021: >90 ML/MIN/1.73M2
ERYTHROCYTE [DISTWIDTH] IN BLOOD BY AUTOMATED COUNT: 15.5 % (ref 10–15)
GLUCOSE BLDC GLUCOMTR-MCNC: 160 MG/DL (ref 70–99)
GLUCOSE BLDC GLUCOMTR-MCNC: 189 MG/DL (ref 70–99)
GLUCOSE BLDC GLUCOMTR-MCNC: 240 MG/DL (ref 70–99)
GLUCOSE SERPL-MCNC: 161 MG/DL (ref 70–99)
HCO3 SERPL-SCNC: 25 MMOL/L (ref 22–29)
HCT VFR BLD AUTO: 22.6 % (ref 40–53)
HGB BLD-MCNC: 7.4 G/DL (ref 13.3–17.7)
INTERPRETATION ECG - MUSE: NORMAL
INTERPRETATION ECG - MUSE: NORMAL
MAGNESIUM SERPL-MCNC: 2.1 MG/DL (ref 1.7–2.3)
MCH RBC QN AUTO: 29 PG (ref 26.5–33)
MCHC RBC AUTO-ENTMCNC: 32.7 G/DL (ref 31.5–36.5)
MCV RBC AUTO: 89 FL (ref 78–100)
P AXIS - MUSE: 20 DEGREES
P AXIS - MUSE: 28 DEGREES
PHOSPHATE SERPL-MCNC: 3.7 MG/DL (ref 2.5–4.5)
PLATELET # BLD AUTO: 263 10E3/UL (ref 150–450)
POTASSIUM SERPL-SCNC: 4.2 MMOL/L (ref 3.4–5.3)
PR INTERVAL - MUSE: 146 MS
PR INTERVAL - MUSE: 148 MS
QRS DURATION - MUSE: 80 MS
QRS DURATION - MUSE: 88 MS
QT - MUSE: 366 MS
QT - MUSE: 368 MS
QTC - MUSE: 464 MS
QTC - MUSE: 479 MS
R AXIS - MUSE: -1 DEGREES
R AXIS - MUSE: 4 DEGREES
RBC # BLD AUTO: 2.55 10E6/UL (ref 4.4–5.9)
SODIUM SERPL-SCNC: 137 MMOL/L (ref 135–145)
SYSTOLIC BLOOD PRESSURE - MUSE: NORMAL MMHG
SYSTOLIC BLOOD PRESSURE - MUSE: NORMAL MMHG
T AXIS - MUSE: 24 DEGREES
T AXIS - MUSE: 3 DEGREES
VENTRICULAR RATE- MUSE: 102 BPM
VENTRICULAR RATE- MUSE: 97 BPM
WBC # BLD AUTO: 10.6 10E3/UL (ref 4–11)

## 2025-05-06 PROCEDURE — 250N000013 HC RX MED GY IP 250 OP 250 PS 637

## 2025-05-06 PROCEDURE — 97530 THERAPEUTIC ACTIVITIES: CPT | Mod: GP

## 2025-05-06 PROCEDURE — 83735 ASSAY OF MAGNESIUM: CPT | Performed by: NURSE PRACTITIONER

## 2025-05-06 PROCEDURE — 250N000013 HC RX MED GY IP 250 OP 250 PS 637: Performed by: SURGERY

## 2025-05-06 PROCEDURE — 84100 ASSAY OF PHOSPHORUS: CPT | Performed by: THORACIC SURGERY (CARDIOTHORACIC VASCULAR SURGERY)

## 2025-05-06 PROCEDURE — 250N000013 HC RX MED GY IP 250 OP 250 PS 637: Performed by: NURSE PRACTITIONER

## 2025-05-06 PROCEDURE — 250N000011 HC RX IP 250 OP 636: Performed by: PHYSICIAN ASSISTANT

## 2025-05-06 PROCEDURE — 80051 ELECTROLYTE PANEL: CPT | Performed by: NURSE PRACTITIONER

## 2025-05-06 PROCEDURE — 97530 THERAPEUTIC ACTIVITIES: CPT | Mod: GO

## 2025-05-06 PROCEDURE — 85048 AUTOMATED LEUKOCYTE COUNT: CPT | Performed by: NURSE PRACTITIONER

## 2025-05-06 PROCEDURE — 250N000013 HC RX MED GY IP 250 OP 250 PS 637: Performed by: PHYSICIAN ASSISTANT

## 2025-05-06 PROCEDURE — 97116 GAIT TRAINING THERAPY: CPT | Mod: GP

## 2025-05-06 PROCEDURE — 120N000005 HC R&B MS OVERFLOW UMMC

## 2025-05-06 PROCEDURE — 97535 SELF CARE MNGMENT TRAINING: CPT | Mod: GO

## 2025-05-06 PROCEDURE — 250N000011 HC RX IP 250 OP 636: Performed by: SURGERY

## 2025-05-06 RX ORDER — METFORMIN HYDROCHLORIDE 500 MG/1
500 TABLET, EXTENDED RELEASE ORAL 2 TIMES DAILY WITH MEALS
Status: DISCONTINUED | OUTPATIENT
Start: 2025-05-06 | End: 2025-05-07 | Stop reason: HOSPADM

## 2025-05-06 RX ORDER — FUROSEMIDE 40 MG/1
40 TABLET ORAL ONCE
Status: COMPLETED | OUTPATIENT
Start: 2025-05-06 | End: 2025-05-06

## 2025-05-06 RX ORDER — FUROSEMIDE 20 MG/1
20 TABLET ORAL ONCE
Status: COMPLETED | OUTPATIENT
Start: 2025-05-06 | End: 2025-05-06

## 2025-05-06 RX ORDER — AMIODARONE HYDROCHLORIDE 200 MG/1
200 TABLET ORAL DAILY
Status: DISCONTINUED | OUTPATIENT
Start: 2025-05-09 | End: 2025-05-07 | Stop reason: HOSPADM

## 2025-05-06 RX ADMIN — INSULIN ASPART 1 UNITS: 100 INJECTION, SOLUTION INTRAVENOUS; SUBCUTANEOUS at 20:06

## 2025-05-06 RX ADMIN — FUROSEMIDE 20 MG: 20 TABLET ORAL at 09:36

## 2025-05-06 RX ADMIN — ACETAMINOPHEN 975 MG: 325 TABLET ORAL at 20:05

## 2025-05-06 RX ADMIN — SITAGLIPTIN 25 MG: 25 TABLET, FILM COATED ORAL at 14:26

## 2025-05-06 RX ADMIN — METFORMIN ER 500 MG 500 MG: 500 TABLET ORAL at 14:26

## 2025-05-06 RX ADMIN — FUROSEMIDE 40 MG: 40 TABLET ORAL at 14:26

## 2025-05-06 RX ADMIN — OXYCODONE HYDROCHLORIDE 5 MG: 5 TABLET ORAL at 00:24

## 2025-05-06 RX ADMIN — Medication 10 ML: at 18:44

## 2025-05-06 RX ADMIN — HEPARIN SODIUM 5000 UNITS: 5000 INJECTION, SOLUTION INTRAVENOUS; SUBCUTANEOUS at 20:06

## 2025-05-06 RX ADMIN — AMIODARONE HYDROCHLORIDE 400 MG: 200 TABLET ORAL at 20:06

## 2025-05-06 RX ADMIN — METHOCARBAMOL 500 MG: 500 TABLET ORAL at 13:33

## 2025-05-06 RX ADMIN — HEPARIN SODIUM 5000 UNITS: 5000 INJECTION, SOLUTION INTRAVENOUS; SUBCUTANEOUS at 05:26

## 2025-05-06 RX ADMIN — ACETAMINOPHEN 975 MG: 325 TABLET ORAL at 12:33

## 2025-05-06 RX ADMIN — AMIODARONE HYDROCHLORIDE 400 MG: 200 TABLET ORAL at 08:25

## 2025-05-06 RX ADMIN — PANTOPRAZOLE SODIUM 40 MG: 40 TABLET, DELAYED RELEASE ORAL at 08:24

## 2025-05-06 RX ADMIN — METFORMIN ER 500 MG 500 MG: 500 TABLET ORAL at 18:44

## 2025-05-06 RX ADMIN — HEPARIN SODIUM 5000 UNITS: 5000 INJECTION, SOLUTION INTRAVENOUS; SUBCUTANEOUS at 12:33

## 2025-05-06 RX ADMIN — OXYCODONE HYDROCHLORIDE 5 MG: 5 TABLET ORAL at 09:36

## 2025-05-06 RX ADMIN — INSULIN ASPART 5 UNITS: 100 INJECTION, SOLUTION INTRAVENOUS; SUBCUTANEOUS at 13:33

## 2025-05-06 RX ADMIN — Medication 37.5 MG: at 20:05

## 2025-05-06 RX ADMIN — OXYCODONE HYDROCHLORIDE 5 MG: 5 TABLET ORAL at 05:26

## 2025-05-06 RX ADMIN — INSULIN GLARGINE 10 UNITS: 100 INJECTION, SOLUTION SUBCUTANEOUS at 09:38

## 2025-05-06 RX ADMIN — Medication 37.5 MG: at 08:25

## 2025-05-06 RX ADMIN — METHOCARBAMOL 500 MG: 500 TABLET ORAL at 08:24

## 2025-05-06 RX ADMIN — SENNOSIDES AND DOCUSATE SODIUM 2 TABLET: 50; 8.6 TABLET ORAL at 08:24

## 2025-05-06 RX ADMIN — INSULIN ASPART 1 UNITS: 100 INJECTION, SOLUTION INTRAVENOUS; SUBCUTANEOUS at 09:36

## 2025-05-06 RX ADMIN — ACETAMINOPHEN 975 MG: 325 TABLET ORAL at 05:26

## 2025-05-06 RX ADMIN — METHOCARBAMOL 500 MG: 500 TABLET ORAL at 20:06

## 2025-05-06 RX ADMIN — ATORVASTATIN CALCIUM 10 MG: 10 TABLET, FILM COATED ORAL at 20:06

## 2025-05-06 RX ADMIN — ASPIRIN 81 MG CHEWABLE TABLET 162 MG: 81 TABLET CHEWABLE at 08:25

## 2025-05-06 ASSESSMENT — ACTIVITIES OF DAILY LIVING (ADL)
ADLS_ACUITY_SCORE: 45
ADLS_ACUITY_SCORE: 45
ADLS_ACUITY_SCORE: 48
ADLS_ACUITY_SCORE: 45
ADLS_ACUITY_SCORE: 48
ADLS_ACUITY_SCORE: 45
ADLS_ACUITY_SCORE: 45
ADLS_ACUITY_SCORE: 48
ADLS_ACUITY_SCORE: 45
ADLS_ACUITY_SCORE: 48
ADLS_ACUITY_SCORE: 46
ADLS_ACUITY_SCORE: 45
ADLS_ACUITY_SCORE: 45
ADLS_ACUITY_SCORE: 49
ADLS_ACUITY_SCORE: 48
ADLS_ACUITY_SCORE: 45
ADLS_ACUITY_SCORE: 46
ADLS_ACUITY_SCORE: 48
ADLS_ACUITY_SCORE: 45

## 2025-05-06 NOTE — PROGRESS NOTES
Care Management Follow Up    Length of Stay (days): 7    Expected Discharge Date: 05/07/2025     Concerns to be Addressed: discharge planning     Patient plan of care discussed at interdisciplinary rounds: Yes    Anticipated Discharge Disposition:     PT on 5/5 recommend ARU.     OT on 5/6 changed discharge recs to home with assist/home care OT/OP cardiac rehab.            Anticipated Discharge Services: None  Anticipated Discharge DME: Shower Chair, Walker, Raised Toilet Seat    Patient/family educated on Medicare website which has current facility and service quality ratings: no  Education Provided on the Discharge Plan: No  Patient/Family in Agreement with the Plan:  n/a    Referrals Placed by CM/SW: Internal Clinic Care Coordination, Durable Medical Equipment (DME)  Private pay costs discussed: Not applicable    Discussed  Partnership in Safe Discharge Planning  document with patient/family: No     Handoff Completed: No, handoff not indicated or clinically appropriate    Additional Information:  SW will need updated PT recs to assess if pt truly needs ARU or can discharge home. [facility] denied referral as they believe pt can discharge home.    Next Steps: SW awaiting updated PT recs.    ___________________    EFRAIN Shafer, DYLAN  6C , covering beds 0541 to [phone]  [facility]   Phone: [phone]  6C Cards TAWANNA Reed

## 2025-05-06 NOTE — PROGRESS NOTES
Cardiovascular Surgery Progress Note  05/06/2025         Assessment and Plan:     Rohit William is a 70 year old male with PMH of multivessel CAD, dilated aortic root/ascending aorta, HTN, T2DM, obesity. He was asymptomatic, CAD and aortic aneurysm were incidental findings on CT calcium score.   S/P coronary artery bypass graft x 3, left endoscopic harvest of greater saphenous vein, left internal mammary harvest by Dr. Barnes on 4/29/25.      Cardiovascular:   Cardiogenic shock, resolved   CAD  Hx multivessel CAD s/p coronary artery bypass graft x3 (Vein to RPDA, vein to Ramus, vein to LAD)  Sinus tachy with PACs  HD stable, ST with -120. MAPS 70s-90s  Post op ECHO 5/1/25: EF 60 to 65%, normal IVC, no pericardial effusion  -  mg daily  - Atorvastatin 10 mg daily   - Metoprolol 37.5 mg BID  - Diuresis as below  - Post op echo 5/1/25 obtained due to sinus tachycardia, no acute findings.   Post-op Atrial fibrillation w RVR (140's)  - A-fib w RVR 5/3 am, given IV Metop 5 mg without conversion.   - IV Amiodarone infusion transitioned to 400 mg PO BID (5/4 - 5/8) and orders for 200 mg PO daily starting 5/9/25 for 21 days.      Chest tubes: Removed in ICU   TPW: Removed in ICU      Pulmonary:  Post extubation respiratory insufficiency, improving   Pulmonary edema in the setting of hypervolemia   Extubated POD 0 to NC. Now saturating well on RA  - Supplemental O2 PRN to keep sats > 92%.  - Pulm hygiene, IS, activity and deep breathing.  - Diuresis as below.     Neurology / MSK:  Acute post-operative pain   Pain well controlled with current regimen:  - Acetaminophen, PO oxycodone PRN, methocarbamol  Left sided Weakness and/or Neglect  Somnolence   Acute multifocal infarcts, likely embolic  Chronic small vessel ischemic disease  - Family noting Rohit was very sleepy 5/2/25, more than previous 2 days. Not as talkative. Found also that Rozerem was added 5/1 overnight by resident, discontinued 5/3.   - PT staff  noted LLE proprioception and strength concerns with walking 5/2, bumping things with left leg.   - Neurology consult 5/2. Unclear timing of onset but seems progressive over past 24-36 hours.              - Brain MRI 5/2: acute multifocal infarcts, prominent in R corona radiata, likely embolic. Moderate chronic small vessel ischemic disease.               - Brain MRA 5/2: mild focal stenosis of P1 segment of Left Post cerebral artery              - Vertebral artery plaques with 40-50% of bilat V4 segments.              - Continue aspirin, statin, Neuro check q 4 hours              - Follow up with Neurology in 8 weeks.       / Renal / Fluid / Electrolytes:  Hypervolemia   BL creat ~ 0.8, most recent creatinine 0.72 WNL, adequate UOP.   FLUID STATUS: Pre-op weight 220 lbs.  - Diuresis: Lasix 40 mg PO once 5/6/25.   - Replete lytes per protocol  - Strict I/O, daily weights  - Avoid/limit nephrotoxins as able     GI / Nutrition:   - Tolerating regular diet  - Continue bowel regimen, + BM    - PPI   - Continue scheduled bowel regimen, lactulose given 5/2, + BM 5/4     Endocrine:  Stress induced hyperglycemia   Uncontrolled type II DM   Hgb A1c 8.6  - Initially managed on insulin drip postop, transitioned to Lantus 10 units and HIGH resistance sliding scale; goal BG <180 for optimal healing.   - Restarted PTA metformin 500 mg BID and Januvia 25 mg daily since 5/6/25. Discontinued Lantus 5/6.     Infectious Disease:  Stress induced leukocytosis  WBC WNL (down trending), remains afebrile, no signs or symptoms of infection  - Completed perioperative antibiotics  - Continue to monitor fever curve, CBC     Hematology:   Acute blood loss anemia and thrombocytopenia  Hgb 7.4, Plt WNL, no signs or symptoms of active bleeding  - CBC daily     Anticoagulation:   -  mg      MSK/Skin:  Sternotomy  Surgical incision  - Sternal precautions  - Incisional cares per protocol     Prophylaxis:   - Stress ulcer prophylaxis:  "Pantoprazole 40 mg daily for 30 days  - DVT prophylaxis: Subcutaneous heparin, SCD     Disposition:   - Transferred to  on 5/1/25  - Therapies recommending discharge to ARU  - Medically Ready for Discharge: medically ready for discharge since 5/5. Pending ARU placement and bed availability    Clinically Significant Risk Factors               # Hypoalbuminemia: Lowest albumin = 3.1 g/dL at 4/30/2025  3:09 AM, will monitor as appropriate               # DMII: A1C = 8.6 % (Ref range: <5.7 %) within past 6 months   # Obesity: Estimated body mass index is 30.56 kg/m  as calculated from the following:    Height as of this encounter: 1.854 m (6' 1\").    Weight as of this encounter: 105.1 kg (231 lb 9.6 oz).      # Financial/Environmental Concerns: none   # History of CABG: noted on surgical history       Discussed with Dr Barnes through written and/or verbal communication.      Bj Grullon PA-C  Cardiothoracic Surgery  Pager [phone]    8:06 AM   May 6, 2025        Interval History:     No overnight events.   States pain is well managed on current regimen. Slept well overnight.  Tolerating diet, is passing flatus, + BM. No nausea or vomiting.  Breathing well without complaints.   Working with therapies and ambulating in halls without assistance.   Denies chest pain, palpitations, dizziness, syncopal symptoms, fevers, chills, myalgias, or sternal popping/clicking.         Physical Exam:   Blood pressure 137/81, pulse 77, temperature 97.5  F (36.4  C), temperature source Oral, resp. rate 18, height 1.854 m (6' 1\"), weight 105.1 kg (231 lb 9.6 oz), SpO2 97%.  Vitals:    05/02/25 0440 05/05/25 0455 05/06/25 0539   Weight: 106.9 kg (235 lb 10.8 oz) 105.6 kg (232 lb 12.8 oz) 105.1 kg (231 lb 9.6 oz)      Weight; + 11 lbs since admit and trending down.   24 hr Fluid status; not accurate  MAPs: 89 - 101    Gen: A&Ox4, NAD  Neuro: no focal deficits   CV: RRR, normal S1 S2, no murmurs, rubs or gallops.   Pulm: CTA, no " wheezing or rhonchi, normal breathing on RA  Abd: nondistended, normal BS, soft, nontender.  Ext: trace peripheral edema, 1+ pitting  Incision: clean, dry, intact, no erythema, sternum stable  Tubes/drain sites: dressing clean and dry         Data:    Imaging:  reviewed recent imaging, no acute concerns    Labs:  BMP  Recent Labs   Lab 05/06/25  0535 05/05/25  2141 05/05/25  1257 05/05/25  0844 05/05/25  0504 05/04/25  0718 05/04/25  0523 05/03/25  0750 05/03/25  0706     --   --   --  137  --  137  --  135   POTASSIUM 4.2  --   --   --  4.1  --  3.9  --  4.1   CHLORIDE 103  --   --   --  103  --  102  --  100   MAK 8.4*  --   --   --  8.1*  --  8.0*  --  8.0*   CO2 25  --   --   --  25  --  26  --  24   BUN 18.3  --   --   --  17.7  --  15.6  --  16.5   CR 0.72  --   --   --  0.73  --  0.66*  --  0.68   * 170* 170* 168* 180*   < > 178*   < > 162*    < > = values in this interval not displayed.     CBC  Recent Labs   Lab 05/06/25  0535 05/05/25  0504 05/04/25  1251 05/04/25  0523 05/03/25  0706   WBC 10.6 9.4  --  8.4 9.4   RBC 2.55* 2.54*  --  2.41* 2.45*   HGB 7.4* 7.4* 7.7* 6.9* 7.3*   HCT 22.6* 22.4*  --  21.1* 21.8*   MCV 89 88  --  88 89   MCH 29.0 29.1  --  28.6 29.8   MCHC 32.7 33.0  --  32.7 33.5   RDW 15.5* 14.9  --  14.7 14.6    241  --  202 186     INR  Recent Labs   Lab 04/29/25  1617 04/29/25  1356   INR 1.30* 1.43*      Hepatic Panel  Recent Labs   Lab 04/30/25  0309 04/30/25  0002 04/29/25  1617   AST 38 43 43   ALT 12 13 14   ALKPHOS 35* 38* 43   BILITOTAL 0.3 0.4 0.6   ALBUMIN 3.1* 3.2* 3.5     GLUCOSE:   Recent Labs   Lab 05/06/25  0535 05/05/25  2141 05/05/25  1257 05/05/25  0844 05/05/25  0504 05/04/25  2131   * 170* 170* 168* 180* 209*

## 2025-05-06 NOTE — PLAN OF CARE
Pt is a 70 year old male with PMH of multivessel CAD, dilated aortic root/ascending aorta, HTN, T2DM, obesity. He was asymptomatic, CAD and aortic aneurysm were incidental findings on CT calcium score. S/P coronary artery bypass graft x 3, left endoscopic harvest of greater saphenous vein, left internal mammary harvest by Dr. Barnes on 4/29/25.     Activity: Assist x 1  Pain: Reports incisional pain. PRN oxy and scheduled Tylenol given for relief.   Neuro: wnl  Cardiac: sinus rhythm  Respiratory: wnl  GI/:  Continent of bowel and bladder.   Diet: Regular  Lines: PIV x 2- SL, 2 lumen PICC  Wounds:  surgical incision and CT WDL    Will continue with current plan of care. Changes and concerns will be reported to provider.

## 2025-05-06 NOTE — PLAN OF CARE
Hours of Care: 2300 - 0700    Changed: no acute changes this shift  PRN: oxy x2  Tele: SR/SA w/ oPVC/PACs   Mobility: SBA w/ walker & gb    Neuro: A/O x 4. Call light appropriate. Able to make needs known.  Respiratory: On room air. Lung sounds clear. Denies shortness of breath at rest.  Cardiac: VSS.    GI/: Last BM 5/4. No report of N/V. Urinating adequate amounts of clear, yellow urine  Endo: Blood sugars ACHS.  Skin: See PCS for assessment and treatment of wounds and surgical incisions.  LDA: R PICC double lumen, L PIV, R PIV   Electrolytes: RN managed Mg, K, Phos. No replacements needed this AM  Pain: managed with current pain regimen  Diet: Regular    P: Continue to follow POC and report changes to CVTS.    Goal Outcome Evaluation:      Plan of Care Reviewed With: patient    Overall Patient Progress: improving    Outcome Evaluation: Neuros intact, pain managed with PRNs overnight. No acute changes this shift

## 2025-05-06 NOTE — PROGRESS NOTES
"CLINICAL NUTRITION SERVICES    Reason for Assessment:  Heart-healthy nutrition education, received consult.    Diet History:  Pt not known to this service PTA.     Per discussion with pt and pt's wife, they monitor carbohydrate intake at home (T2DM). Pt avoids eating excessive carb choices. Pt's wife prepares most of the meals. States they eat quite healthy in general such as leaner protein options, almonds, and suspects they eat lower sodium options. Agreeable to some nutrition education.     Nutrition Diagnosis:  Food- and nutrition-related knowledge deficit r/t no previous knowledge of heart-healthy diet AEB pt report of no previous formal heart-healthy nutrition education.    Nutrition Prescription/Recs:  Continue heart-healthy diet.      Interventions:  Nutrition Education: Provided verbal instruction on a heart-healthy diet. Discussed the various types of fats, limiting sodium, limiting simple carbohydrate (and sweets), and overall healthy eating. Discussed current rec for adequate nutrition with additional protein post-op. Provided handouts previously. Modified oral supplements to send chocolate only Ensure Max.     Goals:    Pt will list at least two interventions to make current meal plan more heart-healthy.     Follow-up:   Patient to ask any further nutrition-related questions before discharge. In addition, pt may request outpatient RD appointment.     Gaby Viera, MS, RD, LD, CNSC      No longer available via pager  6C (beds [id number] and [id number]): Vocera \"6C Clinical Dietitian\"   Weekend/Holiday: Vocera \"Weekend Clinical Dietitian\"   "

## 2025-05-07 VITALS
OXYGEN SATURATION: 98 % | WEIGHT: 229.1 LBS | DIASTOLIC BLOOD PRESSURE: 69 MMHG | TEMPERATURE: 98 F | RESPIRATION RATE: 16 BRPM | BODY MASS INDEX: 30.36 KG/M2 | SYSTOLIC BLOOD PRESSURE: 98 MMHG | HEART RATE: 72 BPM | HEIGHT: 73 IN

## 2025-05-07 LAB
ANION GAP SERPL CALCULATED.3IONS-SCNC: 11 MMOL/L (ref 7–15)
BUN SERPL-MCNC: 20.6 MG/DL (ref 8–23)
CALCIUM SERPL-MCNC: 8.6 MG/DL (ref 8.8–10.4)
CHLORIDE SERPL-SCNC: 102 MMOL/L (ref 98–107)
CREAT SERPL-MCNC: 0.9 MG/DL (ref 0.67–1.17)
EGFRCR SERPLBLD CKD-EPI 2021: >90 ML/MIN/1.73M2
ERYTHROCYTE [DISTWIDTH] IN BLOOD BY AUTOMATED COUNT: 15.7 % (ref 10–15)
GLUCOSE BLDC GLUCOMTR-MCNC: 151 MG/DL (ref 70–99)
GLUCOSE BLDC GLUCOMTR-MCNC: 154 MG/DL (ref 70–99)
GLUCOSE SERPL-MCNC: 172 MG/DL (ref 70–99)
HCO3 SERPL-SCNC: 24 MMOL/L (ref 22–29)
HCT VFR BLD AUTO: 24.8 % (ref 40–53)
HGB BLD-MCNC: 8.1 G/DL (ref 13.3–17.7)
MAGNESIUM SERPL-MCNC: 2 MG/DL (ref 1.7–2.3)
MCH RBC QN AUTO: 29.8 PG (ref 26.5–33)
MCHC RBC AUTO-ENTMCNC: 32.7 G/DL (ref 31.5–36.5)
MCV RBC AUTO: 91 FL (ref 78–100)
PHOSPHATE SERPL-MCNC: 4.5 MG/DL (ref 2.5–4.5)
PLATELET # BLD AUTO: 324 10E3/UL (ref 150–450)
POTASSIUM SERPL-SCNC: 4.3 MMOL/L (ref 3.4–5.3)
RBC # BLD AUTO: 2.72 10E6/UL (ref 4.4–5.9)
SODIUM SERPL-SCNC: 137 MMOL/L (ref 135–145)
WBC # BLD AUTO: 12.6 10E3/UL (ref 4–11)

## 2025-05-07 PROCEDURE — 80051 ELECTROLYTE PANEL: CPT | Performed by: NURSE PRACTITIONER

## 2025-05-07 PROCEDURE — 97530 THERAPEUTIC ACTIVITIES: CPT | Mod: GO

## 2025-05-07 PROCEDURE — 250N000011 HC RX IP 250 OP 636: Performed by: SURGERY

## 2025-05-07 PROCEDURE — 250N000013 HC RX MED GY IP 250 OP 250 PS 637: Performed by: SURGERY

## 2025-05-07 PROCEDURE — 97535 SELF CARE MNGMENT TRAINING: CPT | Mod: GO

## 2025-05-07 PROCEDURE — 250N000013 HC RX MED GY IP 250 OP 250 PS 637: Performed by: PHYSICIAN ASSISTANT

## 2025-05-07 PROCEDURE — 83735 ASSAY OF MAGNESIUM: CPT | Performed by: NURSE PRACTITIONER

## 2025-05-07 PROCEDURE — 84100 ASSAY OF PHOSPHORUS: CPT | Performed by: THORACIC SURGERY (CARDIOTHORACIC VASCULAR SURGERY)

## 2025-05-07 PROCEDURE — 250N000011 HC RX IP 250 OP 636: Performed by: PHYSICIAN ASSISTANT

## 2025-05-07 PROCEDURE — 250N000013 HC RX MED GY IP 250 OP 250 PS 637: Performed by: THORACIC SURGERY (CARDIOTHORACIC VASCULAR SURGERY)

## 2025-05-07 PROCEDURE — 85018 HEMOGLOBIN: CPT | Performed by: NURSE PRACTITIONER

## 2025-05-07 RX ORDER — FUROSEMIDE 20 MG/1
40 TABLET ORAL DAILY
Qty: 7 TABLET | Refills: 0 | Status: SHIPPED | OUTPATIENT
Start: 2025-05-07 | End: 2025-05-07

## 2025-05-07 RX ORDER — POTASSIUM CHLORIDE 1500 MG/1
20 TABLET, EXTENDED RELEASE ORAL DAILY
Qty: 7 TABLET | Refills: 0 | Status: SHIPPED | OUTPATIENT
Start: 2025-05-07 | End: 2025-05-14

## 2025-05-07 RX ORDER — AMIODARONE HYDROCHLORIDE 400 MG/1
400 TABLET ORAL 2 TIMES DAILY
Qty: 3 TABLET | Refills: 0 | Status: SHIPPED | OUTPATIENT
Start: 2025-05-07 | End: 2025-05-09

## 2025-05-07 RX ORDER — OXYCODONE HYDROCHLORIDE 5 MG/1
5 TABLET ORAL EVERY 4 HOURS PRN
Qty: 10 TABLET | Refills: 0 | Status: SHIPPED | OUTPATIENT
Start: 2025-05-07 | End: 2025-05-23

## 2025-05-07 RX ORDER — BISACODYL 10 MG
10 SUPPOSITORY, RECTAL RECTAL DAILY PRN
COMMUNITY
Start: 2025-05-07

## 2025-05-07 RX ORDER — METHOCARBAMOL 500 MG/1
500 TABLET, FILM COATED ORAL 3 TIMES DAILY
Qty: 30 TABLET | Refills: 0 | Status: SHIPPED | OUTPATIENT
Start: 2025-05-07 | End: 2025-05-23

## 2025-05-07 RX ORDER — ACETAMINOPHEN 325 MG/1
975 TABLET ORAL EVERY 6 HOURS PRN
Qty: 180 TABLET | Refills: 0 | Status: SHIPPED | OUTPATIENT
Start: 2025-05-07

## 2025-05-07 RX ORDER — AMIODARONE HYDROCHLORIDE 200 MG/1
200 TABLET ORAL DAILY
Qty: 30 TABLET | Refills: 0 | Status: SHIPPED | OUTPATIENT
Start: 2025-05-09

## 2025-05-07 RX ORDER — AMOXICILLIN 250 MG
2 CAPSULE ORAL 2 TIMES DAILY PRN
Qty: 180 TABLET | Refills: 0 | Status: SHIPPED | OUTPATIENT
Start: 2025-05-07

## 2025-05-07 RX ORDER — POLYETHYLENE GLYCOL 3350 17 G/17G
17 POWDER, FOR SOLUTION ORAL DAILY PRN
COMMUNITY
Start: 2025-05-07

## 2025-05-07 RX ORDER — MAGNESIUM OXIDE 400 MG/1
400 TABLET ORAL EVERY 4 HOURS
Status: COMPLETED | OUTPATIENT
Start: 2025-05-07 | End: 2025-05-07

## 2025-05-07 RX ORDER — FUROSEMIDE 40 MG/1
40 TABLET ORAL DAILY
Qty: 7 TABLET | Refills: 0 | Status: SHIPPED | OUTPATIENT
Start: 2025-05-07 | End: 2025-05-23

## 2025-05-07 RX ORDER — ASPIRIN 81 MG/1
162 TABLET, CHEWABLE ORAL DAILY
Qty: 180 TABLET | Refills: 0 | Status: SHIPPED | OUTPATIENT
Start: 2025-05-08

## 2025-05-07 RX ORDER — METOPROLOL SUCCINATE 100 MG/1
100 TABLET, EXTENDED RELEASE ORAL DAILY
Qty: 60 TABLET | Refills: 0 | Status: SHIPPED | OUTPATIENT
Start: 2025-05-07

## 2025-05-07 RX ADMIN — Medication 5 ML: at 04:38

## 2025-05-07 RX ADMIN — MAGNESIUM OXIDE TAB 400 MG (241.3 MG ELEMENTAL MG) 400 MG: 400 (241.3 MG) TAB at 12:41

## 2025-05-07 RX ADMIN — MAGNESIUM OXIDE TAB 400 MG (241.3 MG ELEMENTAL MG) 400 MG: 400 (241.3 MG) TAB at 08:36

## 2025-05-07 RX ADMIN — INSULIN ASPART 1 UNITS: 100 INJECTION, SOLUTION INTRAVENOUS; SUBCUTANEOUS at 08:34

## 2025-05-07 RX ADMIN — METHOCARBAMOL 500 MG: 500 TABLET ORAL at 08:36

## 2025-05-07 RX ADMIN — Medication 37.5 MG: at 08:36

## 2025-05-07 RX ADMIN — PANTOPRAZOLE SODIUM 40 MG: 40 TABLET, DELAYED RELEASE ORAL at 08:36

## 2025-05-07 RX ADMIN — ACETAMINOPHEN 975 MG: 325 TABLET ORAL at 04:30

## 2025-05-07 RX ADMIN — AMIODARONE HYDROCHLORIDE 400 MG: 200 TABLET ORAL at 08:35

## 2025-05-07 RX ADMIN — SITAGLIPTIN 25 MG: 25 TABLET, FILM COATED ORAL at 08:35

## 2025-05-07 RX ADMIN — HEPARIN SODIUM 5000 UNITS: 5000 INJECTION, SOLUTION INTRAVENOUS; SUBCUTANEOUS at 04:30

## 2025-05-07 RX ADMIN — HEPARIN SODIUM 5000 UNITS: 5000 INJECTION, SOLUTION INTRAVENOUS; SUBCUTANEOUS at 12:40

## 2025-05-07 RX ADMIN — METFORMIN ER 500 MG 500 MG: 500 TABLET ORAL at 08:35

## 2025-05-07 RX ADMIN — ASPIRIN 81 MG CHEWABLE TABLET 162 MG: 81 TABLET CHEWABLE at 08:35

## 2025-05-07 RX ADMIN — OXYCODONE HYDROCHLORIDE 5 MG: 5 TABLET ORAL at 00:31

## 2025-05-07 RX ADMIN — ACETAMINOPHEN 975 MG: 325 TABLET ORAL at 12:40

## 2025-05-07 ASSESSMENT — ACTIVITIES OF DAILY LIVING (ADL)
ADLS_ACUITY_SCORE: 42
ADLS_ACUITY_SCORE: 42
ADLS_ACUITY_SCORE: 46
ADLS_ACUITY_SCORE: 42
ADLS_ACUITY_SCORE: 46
ADLS_ACUITY_SCORE: 42
ADLS_ACUITY_SCORE: 46
ADLS_ACUITY_SCORE: 42
ADLS_ACUITY_SCORE: 46
ADLS_ACUITY_SCORE: 46

## 2025-05-07 NOTE — DISCHARGE SUMMARY
[facility], [place]   Cardiothoracic Surgery Hospital Discharge Summary     Rohit William MRN# [id number]   Age: 70 year old YOB: 1955     Admitting Physician:  Miguel Barnes MD  Discharge Physician:   LINSEY Nobles CNP  Primary Care Physician: Martin Marsh      DATE OF ADMISSION: 4/29/2025      DATE OF DISCHARGE: 05/07/25    Admit Wt: 220 lbs  Discharge Wt: 229 lbs 1.6 oz         Primary Diagnoses:   Multivessel CAD s/p coronary artery bypass graft x3 (Vein to RPDA, vein to Ramus, vein to LAD)           Secondary Diagnoses:   Acute post-operative pain, stable   Stress-induced hyperglycemia, resolved   Stress-induced leukocytosis, improved   Acute blood loss anemia  Cardiogenic shock, resolved   Sinus tachy with PACs, resolved   Post-op Atrial fibrillation w RVR (140's), resolved   Post extubation respiratory insufficiency, resolved   Pulmonary edema in the setting of hypervolemia, improved   Multifocal ischemic strokes post operatively   Left hemibody weakness, improving  Chronic small vessel ischemic disease   Hypervolemic, improved   Uncontrolled type II DM     PROCEDURES PERFORMED:   Date: 4/29/2025    Surgeon: Dr. Barnes    Procedure/Surgery Information   Procedure: Procedure(s):  Median sternotomy, on cardiopulmonary pump, coronary artery bypass graft x 3, left endoscopic harvest of greater saphenous vein, left internal mammary harvest, intraoperative transesophageal echocardiogram by anesthesia   Surgeon(s): Surgeons and Role:     * Miguel Barnes MD - Primary     * Ruslan Mccoy MD - Assisting     * Ashwini Jensen PA-C - Assisting     * Helio Diaz MD - Assisting   Specimens: * No specimens in log *     Non-operative procedures   PICC line placement       PATHOLOGY RESULTS:  none     CULTURE RESULTS:  none    CONSULTS:    PT/OT  Intensivist  Neurology     BRIEF HISTORY OF ILLNESS:  Rohit William is a 70 year old male with  PMH of multivessel CAD, dilated aortic root/ascending aorta, HTN, T2DM, obesity. He was asymptomatic, CAD and aortic aneurysm were incidental findings on CT calcium score. S/P coronary artery bypass graft x 3, left endoscopic harvest of greater saphenous vein, left internal mammary harvest by Dr. Barnes on 4/29/25. Post operative course complicated by multifocal ischemic strokes with mild left hemibody weakness that improved at the time of discharge.     HOSPITAL COURSE: Rhoit William is a 70 year old male who on 4/29/2025 underwent the above-named procedures and tolerated the operation well.     Postoperatively was admitted to the CVICU.  Patient was extubated on POD 0.  Blood pressure and cardiac index were managed with vasopressors and inotropic agents which were continuously weaned until no longer needed.       Patient was transiently hyperglycemic and treated with insulin infusion then transitioned to sliding scale insulin per protocol. Blood sugars remained stable such that he was discharge on PTA metformin and sitagliptin. Ongoing blood glucose management per PCP.     Patient was subsequently transferred to the surgical telemetry floor on POD 2.    While on the surgical unit, the patient continued to progress well. Chest tubes and temporary pacemaker wires were removed when deemed appropriate.    On 5/2/25 patient noted to have left-sided weakness. Brain MRI 5/2/25 with acute multifocal infarcts, prominent in R corona radiata, likely embolic. Moderate chronic small vessel ischemic disease. Brain MRA 5/2/25 with mild focal stenosis of P1 segment of Left Post cerebral artery. Vertebral artery plaques with 40-50% of bilat V4 segments. Follow up with neurology in 6-8 weeks and o patch for 14 day monitoring, neurology to coordinate.     On 5/3/25 patient developed atrial fibrillation with RVR and heart rates in the 140s.  A PICC line was placed and he was initiated on amiodarone infusion.  He was then  "transitioned to oral amiodarone 400 mg p.o. twice daily for 4 days followed by 200 mg p.o. daily for 30 days.  He converted to sinus rhythm on 5/4/25 and he remained in sinus rhythm throughout remainder of hospitalization.     Patient was fluid overloaded and treated with diuretics. He remains up about 9 lbs from preoperative weight and will discharge with 7 days of diuretic therapy; ongoing need will require re-evaluation during clinic follow-up. Patient instructed to perform daily weights and call CVTS RNCC with weight gain, SOB, dizziness.      Prior to discharge, his pain was controlled well, he was working well with therapies, able to perform most ADLs, ambulate without difficulty, and had full return of bowel and bladder function. His discharge recommendations changed from ARU to home with outpatient PT/OT on 5/6/25. Outpatient therapy ordered at discharge, follow up with PCP.     On 05/07/25, he was discharged home to in stable condition. Sternal precautions will continue for 12 weeks post-operatively with a upper extremity weight limit of 10 lb (pushing/pulling/lifting) for the first 8 weeks followed by an additional 4 weeks with a limit of 20 lb.  Follow up with Cardiology and Cardiac Surgery have been arranged. Pt encouraged to follow up with PCP and Cardiac Rehab upon discharge.    Patient discharged on aspirin:  Yes - 162 mg  Patient discharged on beta blocker: yes    Patient discharged on ACE Inhibitor/ARB: no, increased BB therapy, consider at follow up.     Patient discharged on statin: yes          Discharge Disposition:     Discharged to home            Condition on Discharge:     Discharge condition: Stable   Discharge vitals: BP 98/69 (BP Location: Left arm, Cuff Size: Adult Regular)   Pulse 72   Temp 98  F (36.7  C) (Oral)   Resp 16   Ht 1.854 m (6' 1\")   Wt 103.9 kg (229 lb 1.6 oz)   SpO2 98%   BMI 30.23 kg/m     Code status on discharge: Full Code     Vitals:    05/05/25 0455 05/06/25 " 0539 05/07/25 0445   Weight: 105.6 kg (232 lb 12.8 oz) 105.1 kg (231 lb 9.6 oz) 103.9 kg (229 lb 1.6 oz)         AFTER YOU GO HOME FROM YOUR HEART SURGERY  1.  Coronary artery bypass grafting x 3               - reversed saphenous vein graft to the right posterior descending coronary artery              - reversed saphenous vein graft to the ramus intermedius coronary artery              - reversed saphenous vein graft to the left anterior descending coronary artery              ** please note the left internal mammary artery was not used due to poor blood flow after transection and assessment  2.  Endoscopic vein harvest of the greater saphenous vein from the left lower extremity.  3. Transesophageal echocardiogram  On 4/29/25     You had a sternotomy, avoid lifting anything greater than ten pounds for 8 weeks after surgery, then 20 pounds for an additional 4 weeks.   Do not reach backwards or use arms to push out of chair.   Do not let people pull on your arms to assist with standing.   Avoid twisting or reaching too far across your body.    Do not sleep on your side for >12 weeks after surgery.  Avoid strenuous activities such as bowling, vacuuming, raking, shoveling, golf or tennis for 12 weeks after your surgery.   It is okay to resume sex if you feel comfortable in doing so. You may have to try different positions with your partner.    Splint your chest incision by hugging a pillow or bringing your arms across your chest when coughing or sneezing.     No driving for 4 weeks after surgery or while on pain medication.    Shower or wash your incisions daily with soap and water (or as instructed), then pat dry. Do not scrub at the incision, and do not let the water hit your chest directly until fully healed.  No baths or swimming for 1 month.   Cover chest tube sites with dry gauze until they stop draining, then leave open to air. It is normal for chest tube sites to drain yellowish/clear fluid for up to 2-3 weeks  after surgery.   Watch for signs of infection: increased redness, tenderness, warmth or any drainage that appears infected (pus like) or is persistent.  Also a temperature > 100.5 F or chills. Call your surgeon or primary care provider's office immediately if you see any of these signs.  Remove any skin glue left on incisions after 10-14 days. This will not affect your incision and can speed up healing.    Exercise is very important in your recovery. Please follow the guidelines set up for you in your cardiac rehab classes at the hospital. If outpatient cardiac rehab was ordered for you, we highly recommend you participate. If you have problems arranging your cardiac rehab, please call [phone] for all locations, with the exception of [place], please call [phone] and [place], please call [phone].    Avoid sitting for prolonged periods of time, try to walk every hour during the day. If you have a leg incision, elevate your leg often when you are not walking.    Take your blood pressure daily. If the top number is consistently <90 or >140 please call our office for further instructions.    Check your weight when you get home from the hospital and continue to check it daily for at least a month. If you notice a weight gain of 3 pounds in 1 day or 5 pounds in a week, call your surgeon or cardiologist.     Bowel activity may be slow after surgery. If necessary, you may take an over the counter laxative such as Milk of Magnesia or Miralax. You may have stool softeners prescribed (docusate sodium, Senokot). We recommend using stool softeners while using narcotics for pain (oxycodone/percocet, hydrocodone/vicodin, hydromorphone/dilaudid).      Wean OFF of narcotics (oxycodone, dilaudid, hydrocodone) as soon as possible. You should continue taking acetaminophen as long as you have any surgical pain as the first choice for pain control and add narcotics as necessary for pain to be tolerable.        REGARDING  PRESCRIPTION REFILLS.  If you need a refill on your pain medication contact us to discuss your pain and a possible one time refill.   All other medications will be adjusted, discontinued and re-filled by your primary care physician and/or your cardiologist as they were prior to your surgery. We have given you enough for one to three month with possibly one refill.    POST-OPERATIVE CLINIC VISITS  You have a follow up visit with CVT Surgery Advance Care Practitioners at the [facility].  You will then return to the care of your primary provider and your cardiologist. Future medication refills should come from your PCP or Cardiologist.   You should see your primary care provider in 2-4 weeks after discharge.   It is important to see your cardiologist about 4-6 weeks after discharge.    If you do not hear from a  in 7 days, please call [phone] (choose option 1) and request to be seen with a general cardiologist or someone that you have seen in the past.   If there is a need to return to see CT Surgery please call our  at [phone].    SURGICAL QUESTIONS  Please call Jamaica Aguilera, Corinne Pickett, Annalisa Read, or Althea Zuniga with surgical recovery and medication questions, their phone numbers are listed below.  They will assist you with your needs and contact other surgery care team members as indicated.    On weekends or after hours, please call [phone] and ask the  to page the Cardiothoracic Surgery fellow on call.      Thank you,    Your Cardiothoracic Surgery Team   Jamaica Aguilera RN Care Coordinator -  [phone]  Corinne Pickett RN Care Coordinator - [phone]   Althea Zuniga, RN Care Coordinator - [phone]   Annalisa Read, RN Care Coordinator - [phone]  Candy Posey, RN Care Coordinator - [phone]    DAY OF DISCHARGE PHYSICAL EXAM:    Gen: NAD, calm, cooperative on exam  Neuro: A&Ox4, no focal deficits   CV: RRR, S1 S2, no murmurs, rubs or  gallops  Pulm: CTA, no wheezing or rhonchi, unlabored breathing on RA   Abd: SNTND, active BS  Ext: 2+ peripheral edema, incision C/D/I  Incision: clean, dry, intact, no erythema, sternum stable  Tubes/drain sites: scabbed over, no significant drainage or surrounding erythema      CBC RESULTS:   Recent Labs   Lab Test 05/07/25  0438 05/06/25  0535 05/05/25  0504   WBC 12.6* 10.6 9.4   HGB 8.1* 7.4* 7.4*   HCT 24.8* 22.6* 22.4*    263 241     CMP RESULTS:  Recent Labs   Lab Test 05/07/25  0832 05/07/25  0438 05/07/25  0429 05/06/25  0806 05/06/25  0535 05/05/25  0844 05/05/25  0504 04/30/25  0400 04/30/25  0309 04/30/25  0103 04/30/25  0002 04/29/25  1702 04/29/25  1617   NA  --  137  --   --  137  --  137   < > 136  --  138  --  138   POTASSIUM  --  4.3  --   --  4.2  --  4.1   < > 4.1  --  4.0  --  3.7   CHLORIDE  --  102  --   --  103  --  103   < > 107  --  108*  --  107   CO2  --  24  --   --  25  --  25   < > 22  --  19*  --  21*   ANIONGAP  --  11  --   --  9  --  9   < > 7  --  11  --  10   * 172* 154*   < > 161*   < > 180*   < > 155*   < > 205*   < > 191*   BUN  --  20.6  --   --  18.3  --  17.7   < > 16.8  --  17.5  --  17.5   CR  --  0.90  --   --  0.72  --  0.73   < > 0.77  --  0.83  --  0.80   GFRESTIMATED  --  >90  --   --  >90  --  >90   < > >90  --  >90  --  >90   MAK  --  8.6*  --   --  8.4*  --  8.1*   < > 8.4*  --  8.5*  --  9.5   BILITOTAL  --   --   --   --   --   --   --   --  0.3  --  0.4  --  0.6   ALKPHOS  --   --   --   --   --   --   --   --  35*  --  38*  --  43   ALT  --   --   --   --   --   --   --   --  12  --  13  --  14   AST  --   --   --   --   --   --   --   --  38  --  43  --  43    < > = values in this interval not displayed.     Recent Labs   Lab Test 04/29/25  1617 04/29/25  1356 04/03/25  0927   INR 1.30* 1.43* 0.94     Recent Labs   Lab 05/07/25  0832 05/07/25  0438 05/07/25  0429 05/06/25  2215 05/06/25  1912 05/06/25  1843   * 172* 154* 189* 160* 160*        ECHOCARDIOGRAM  Recent Results (from the past 4320 hours)   Echocardiogram Limited   Result Value    LVEF  60-65%    [facility]    [id number]  [id number]  [id number]  [id number]^JOVITA^LB^CATARINA     [facility],[place]  Echocardiography Laboratory  [address] [place] [id number]     Name: SOFIE FERNANDES  MRN: [id number]  : 1955  Study Date: 2025 02:02 PM  Age: 70 yrs  Gender: Male  Patient Location: [facility]  Reason For Study: Tachycardia  Ordering Physician: LB HUSSEIN  Performed By: Kat Zhang     BSA: 2.3 m2  Height: 73 in  Weight: 233 lb  HR: 111  ______________________________________________________________________________  Procedure  Limited Echocardiogram with two-dimensional, color and spectral Doppler.  Contrast Optison. Optison (NDC #8650-7034-46) given intravenously. Patient was  given 6 ml mixture of 3 ml Optison and 6 ml saline. 3 ml wasted.  ______________________________________________________________________________  Interpretation Summary  Technically difficult study.     Left ventricular size, wall motion and function are normal. The ejection  fraction is 60-65%.     Pulmonary artery systolic pressure cannot be assessed.     The inferior vena cava is normal.     No pericardial effusion is present.     Compared to prior imaging the aortic root dilatin is not as well appreciated.  ______________________________________________________________________________  Left Ventricle  Left ventricular size, wall motion and function are normal. The ejection  fraction is 60-65%. Mild concentric wall thickening consistent with left  ventricular hypertrophy is present.     Right Ventricle  The right ventricle is normal size. Global right ventricular function is  normal.     Atria  The atria cannot be assessed. The atrial septum is intact as assessed by color  Doppler .     Mitral Valve  The mitral valve is normal.     Aortic Valve  Aortic valve is normal in  structure and function.     Tricuspid Valve  The tricuspid valve is normal. The peak velocity of the tricuspid regurgitant  jet is not obtainable. Pulmonary artery systolic pressure cannot be assessed.     Pulmonic Valve  The valve leaflets are not well visualized.     Vessels  The inferior vena cava is normal. The aorta is not well seen and can not be  measured. IVC diameter <2.1 cm collapsing >50% with sniff suggests a normal RA  pressure of 3 mmHg.     Pericardium  No pericardial effusion is present.     Miscellaneous  Technically difficult study.     Compared to Previous Study  Compared to prior imaging the aortic root dilatin is not as well appreciated.  ______________________________________________________________________________  Report approved by: Fish Salcido MD on 2025 03:01 PM     ______________________________________________________________________________      Echocardiogram Complete   Result Value    LVEF  55-60%    Biplane LVEF 56%    [facility]    [id number]  [id number]  [id number]  [id number]^RONAN^CHUEN^TY     [facility], Echocardiography Laboratory  [address] [place] [address]     Name: SOFIE FERNANDES  MRN: [id number]  : 1955  Study Date: 2025 08:35 AM  Age: 69 yrs  Gender: Male  Patient Location: [facility]  Reason For Study: Type 2 diabetes mellitus with other specified complication,  unsp  Ordering Physician: FILIBERTO FERRARO  Referring Physician: FILIBERTO FERRARO  Performed By: Richelle Fernandes LAMONT     BSA: 2.2 m2  Height: 72 in  Weight: 227 lb  BP: 127/87 mmHg  ______________________________________________________________________________  Procedure  Echocardiogram with two-dimensional, color and spectral Doppler. Definity (NDC  #84230-551-69) given intravenously. Patient was given 3.0ml mixture of 1.5ml  Definity and 8.5ml saline. 7.0 ml wasted. IV start location RAC  .  ______________________________________________________________________________  Interpretation Summary     Global and regional left ventricular function is normal with an EF of 55-60%.  Mild concentric wall thickening consistent with left ventricular hypertrophy  is present.  Global right ventricular function is normal.  No significant valvular abnormalities were noted.  Dilated aortic root and ascending aorta measuring 4.4 cm (2.0 cm/m2) and 4.4  cm (2.0 cm/m2 respectively.) Recommend CTA or MRA chest to better assess  aortic dimensions.  Previous study not available for comparison.  ______________________________________________________________________________  Left Ventricle  Left ventricular size is normal. Global and regional left ventricular function  is normal with an EF of 55-60%. Biplane LVEF is 56%. Mild concentric wall  thickening consistent with left ventricular hypertrophy is present. Thickening  of the anterobasal septum is present. Left ventricular diastolic function is  indeterminate.     Right Ventricle  The right ventricle is normal size. Global right ventricular function is  normal.     Atria  The right atria appears normal. Mild left atrial enlargement is present.     Mitral Valve  The mitral valve is normal. Trace mitral insufficiency is present.     Aortic Valve  The aortic valve is tricuspid. Trace aortic insufficiency is present.     Tricuspid Valve  The tricuspid valve is normal. Trace tricuspid insufficiency is present. The  right ventricular systolic pressure is approximated at 25.0 mmHg plus the  right atrial pressure.     Pulmonic Valve  The pulmonic valve is normal. Trace pulmonic insufficiency is present.     Vessels  The inferior vena cava was normal in size with preserved respiratory  variability. Aortic root and ascending dilatation is present. Sinuses of  Valsalva 4.4 cm. Ascending aorta 4.4 cm. IVC diameter <2.1 cm collapsing >50%  with sniff suggests a normal RA pressure of 3  mmHg.     Compared to Previous Study  Previous study not available for comparison.     ______________________________________________________________________________  MMode/2D Measurements & Calculations  IVSd: 1.4 cm  LVIDd: 5.0 cm  LVIDs: 3.0 cm  LVPWd: 1.3 cm  FS: 39.2 %     LV mass(C)d: 285.4 grams  LV mass(C)dI: 127.0 grams/m2  Ao root diam: 4.4 cm  asc Aorta Diam: 4.4 cm  LVOT diam: 2.7 cm  LVOT area: 5.9 cm2  Ao root diam index Ht(cm/m): 2.4  Ao root diam index BSA (cm/m2): 2.0  Asc Ao diam index BSA (cm/m2): 2.0  Asc Ao diam index Ht(cm/m): 2.4  EF Biplane: 56.2 %  LA Volume (BP): 75.3 ml     LA Volume Index (BP): 33.5 ml/m2  RV Base: 3.3 cm  RWT: 0.53  TAPSE: 1.7 cm     Doppler Measurements & Calculations  MV E max trudy: 61.7 cm/sec  MV A max trudy: 71.3 cm/sec  MV E/A: 0.87  MV dec time: 0.13 sec  PA acc time: 0.08 sec  TR max trudy: 249.8 cm/sec  TR max P.0 mmHg  E/E' av.4  Lateral E/e': 9.8  Medial E/e': 17.0     ______________________________________________________________________________  Report approved by: Mark Galindo MD on 2025 09:58 AM             CXR:    EXAM: XR CHEST PORT 1 VIEW  5/3/2025 3:59 PM      HISTORY:  PICC tip verification        COMPARISON:  2025     FINDINGS:      Right upper extremity PICC tip projects over the right atrium.     Trachea is midline. Postsurgical changes of the chest with multilevel  sternotomy wires. Cardiomediastinal silhouette and pulmonary  vasculature are stable. Mildly improved perihilar and retrocardiac  opacities. Trace bilateral pleural effusions. No discernible  pneumothorax.     No acute osseous abnormality. Visualized upper abdomen is  unremarkable.                                                                        IMPRESSION:     1. Right upper extremity PICC tip projects over the high right atrium.  No discernible pneumothorax.  2. Improved perihilar and retrocardiac opacities, may be secondary to  edema/atelectasis.   3. Trace  bilateral pleural effusions.         PRE-ADMISSION MEDICATIONS:  Medications Prior to Admission   Medication Sig Dispense Refill Last Dose/Taking    atorvastatin (LIPITOR) 10 MG tablet Take 1 tablet (10 mg) by mouth daily or cholesterol/prevents hearts attacks/strokes (Patient taking differently: Take 10 mg by mouth every morning. or cholesterol/prevents hearts attacks/strokes) 90 tablet 3 4/29/2025 at  4:00 AM    atorvastatin (LIPITOR) 80 MG tablet Take 1 tablet (80 mg) by mouth daily. (Patient taking differently: Take 80 mg by mouth every morning.) 90 tablet 3 4/29/2025 at  4:00 AM    fish oil-omega-3 fatty acids 1000 MG capsule Take 2 g by mouth daily.   Past Month    magnesium oxide (MAG-OX) 400 MG tablet Take 400 mg by mouth daily.   Past Month    metFORMIN (GLUCOPHAGE XR) 500 MG 24 hr tablet TAKE 1 TAB  BY MOUTH DAILY (WITH BREAKFAST) AND 1 TAB DAILY (WITH DINNER) 180 tablet 0 4/28/2025 at  6:00 PM    sitagliptin (JANUVIA) 25 MG tablet Take 1 tablet (25 mg) by mouth daily. (Patient taking differently: Take 25 mg by mouth every morning.) 30 tablet 3 Past Week    zinc gluconate 50 MG tablet Take 50 mg by mouth daily.   Past Month    blood glucose (NO BRAND SPECIFIED) lancets standard Use to test blood sugar one time daily or as directed. 100 each 3     blood glucose (ONETOUCH ULTRA) test strip USE TO TEST BLOOD SUGAR ONE TIME DAILY OR AS DIRECTED. 100 strip 1     blood glucose monitoring (ONE TOUCH ULTRA 2) meter device kit Use to test blood sugar one time daily or as directed. 1 kit 0     Lancets (ONETOUCH DELICA PLUS [id number]) MISC USE TO TEST BLOOD SUGAR ONE TIME DAILY OR AS DIRECTED.       [DISCONTINUED] aspirin (ASA) 81 MG tablet Take 81 mg by mouth every morning. 1 tablet 3 4/28/2025 at 10:00 AM    [DISCONTINUED] losartan-hydrochlorothiazide (HYZAAR) 100-25 MG tablet Take 1 tablet by mouth daily. For blood pressure in AM. This is a combination pill of cozaar and hydrochlorothiazide. Finish individual  pills first (Patient taking differently: Take 1 tablet by mouth every morning. For blood pressure in AM. This is a combination pill of cozaar and hydrochlorothiazide. Finish individual pills first) 90 tablet 1 Past Week         DISCHARGE MEDICATIONS:      Review of your medicines        START taking        Dose / Directions   acetaminophen 325 MG tablet  Commonly known as: TYLENOL  Used for: S/P CABG (coronary artery bypass graft)      Dose: 975 mg  Take 3 tablets (975 mg) by mouth every 6 hours as needed for mild pain.  Quantity: 180 tablet  Refills: 0     * amiodarone 400 MG tablet  Commonly known as: PACERONE  Used for: S/P CABG (coronary artery bypass graft)      Dose: 400 mg  Take 1 tablet (400 mg) by mouth 2 times daily for 2 days.  Quantity: 3 tablet  Refills: 0     * amiodarone 200 MG tablet  Commonly known as: PACERONE  Used for: S/P CABG (coronary artery bypass graft)      Dose: 200 mg  Start taking on: May 9, 2025  Take 1 tablet (200 mg) by mouth daily.  Quantity: 30 tablet  Refills: 0     aspirin 81 MG chewable tablet  Commonly known as: ASA  Used for: S/P CABG (coronary artery bypass graft)  Replaces: aspirin 81 MG tablet      Dose: 162 mg  Start taking on: May 8, 2025  Take 2 tablets (162 mg) by mouth daily.  Quantity: 180 tablet  Refills: 0     bisacodyl 10 MG suppository  Commonly known as: DULCOLAX  Used for: S/P CABG (coronary artery bypass graft)      Dose: 10 mg  Place 1 suppository (10 mg) rectally daily as needed for constipation (Use if magnesium hydroxide (MILK of MAGNESIA) is not effective after 24 hours. May discontinue if patient having bowel movement.).  Refills: 0     furosemide 40 MG tablet  Commonly known as: LASIX  Used for: S/P CABG (coronary artery bypass graft)      Dose: 40 mg  Take 1 tablet (40 mg) by mouth daily.  Quantity: 7 tablet  Refills: 0     magnesium hydroxide 400 MG/5ML suspension  Commonly known as: MILK OF MAGNESIA  Used for: S/P CABG (coronary artery bypass graft)       Dose: 30 mL  Take 30 mLs by mouth daily as needed for constipation (Use if polyethylene glycol (Miralax) is not effective after 24 hours.).  Refills: 0     methocarbamol 500 MG tablet  Commonly known as: ROBAXIN  Used for: S/P CABG (coronary artery bypass graft)      Dose: 500 mg  Take 1 tablet (500 mg) by mouth 3 times daily.  Quantity: 30 tablet  Refills: 0     metoprolol succinate  MG 24 hr tablet  Commonly known as: TOPROL XL  Used for: S/P CABG (coronary artery bypass graft)      Dose: 100 mg  Take 1 tablet (100 mg) by mouth daily.  Quantity: 60 tablet  Refills: 0     oxyCODONE 5 MG tablet  Commonly known as: ROXICODONE  Used for: S/P CABG (coronary artery bypass graft)      Dose: 5 mg  Take 1 tablet (5 mg) by mouth every 4 hours as needed for moderate pain.  Quantity: 10 tablet  Refills: 0     polyethylene glycol 17 GM/Dose powder  Commonly known as: MIRALAX  Used for: S/P CABG (coronary artery bypass graft)      Dose: 17 g  Take 17 g by mouth daily as needed for constipation.  Refills: 0     potassium chloride med ER 20 MEQ CR tablet  Commonly known as: KLOR-CON M20  Used for: S/P CABG (coronary artery bypass graft)      Dose: 20 mEq  Take 1 tablet (20 mEq) by mouth daily for 7 days. Take with Lasix.  Quantity: 7 tablet  Refills: 0     senna-docusate 8.6-50 MG tablet  Commonly known as: SENOKOT-S/PERICOLACE  Used for: S/P CABG (coronary artery bypass graft)      Dose: 2 tablet  Take 2 tablets by mouth 2 times daily as needed for constipation.  Quantity: 180 tablet  Refills: 0           * This list has 2 medication(s) that are the same as other medications prescribed for you. Read the directions carefully, and ask your doctor or other care provider to review them with you.                CONTINUE these medicines which may have CHANGED, or have new prescriptions. If we are uncertain of the size of tablets/capsules you have at home, strength may be listed as something that might have changed.         Dose / Directions   * atorvastatin 10 MG tablet  Commonly known as: LIPITOR  This may have changed: when to take this  Used for: Type 2 diabetes mellitus with other specified complication, unspecified whether long term insulin use (H)      Dose: 10 mg  Take 1 tablet (10 mg) by mouth daily or cholesterol/prevents hearts attacks/strokes  Quantity: 90 tablet  Refills: 3     * atorvastatin 80 MG tablet  Commonly known as: LIPITOR  This may have changed: when to take this  Used for: CAD, multiple vessel      Dose: 80 mg  Take 1 tablet (80 mg) by mouth daily.  Quantity: 90 tablet  Refills: 3     sitagliptin 25 MG tablet  Commonly known as: JANUVIA  This may have changed: when to take this  Used for: Type 2 diabetes mellitus with other specified complication, unspecified whether long term insulin use (H)      Dose: 25 mg  Take 1 tablet (25 mg) by mouth daily.  Quantity: 30 tablet  Refills: 3           * This list has 2 medication(s) that are the same as other medications prescribed for you. Read the directions carefully, and ask your doctor or other care provider to review them with you.                CONTINUE these medicines which have NOT CHANGED        Dose / Directions   blood glucose lancets standard  Commonly known as: NO BRAND SPECIFIED  Used for: Type 2 diabetes mellitus with other specified complication, unspecified whether long term insulin use (H)      Use to test blood sugar one time daily or as directed.  Quantity: 100 each  Refills: 3     blood glucose monitoring meter device kit  Used for: Type 2 diabetes mellitus with other specified complication, unspecified whether long term insulin use (H)      Use to test blood sugar one time daily or as directed.  Quantity: 1 kit  Refills: 0     fish oil-omega-3 fatty acids 1000 MG capsule      Dose: 2 g  Take 2 g by mouth daily.  Refills: 0     magnesium oxide 400 MG tablet  Commonly known as: MAG-OX      Dose: 400 mg  Take 400 mg by mouth daily.  Refills: 0      metFORMIN 500 MG 24 hr tablet  Commonly known as: GLUCOPHAGE XR  Used for: Type 2 diabetes mellitus with other specified complication, unspecified whether long term insulin use (H)      TAKE 1 TAB  BY MOUTH DAILY (WITH BREAKFAST) AND 1 TAB DAILY (WITH DINNER)  Quantity: 180 tablet  Refills: 0     OneTouch Delica Plus [id number] Misc      USE TO TEST BLOOD SUGAR ONE TIME DAILY OR AS DIRECTED.  Refills: 0     OneTouch Ultra test strip  Used for: Type 2 diabetes mellitus with other specified complication, unspecified whether long term insulin use (H)  Generic drug: blood glucose      USE TO TEST BLOOD SUGAR ONE TIME DAILY OR AS DIRECTED.  Quantity: 100 strip  Refills: 1     zinc gluconate 50 MG tablet      Dose: 50 mg  Take 50 mg by mouth daily.  Refills: 0            STOP taking      aspirin 81 MG tablet  Commonly known as: ASA  Replaced by: aspirin 81 MG chewable tablet        losartan-hydrochlorothiazide 100-25 MG tablet  Commonly known as: HYZAAR                  Where to get your medicines        These medications were sent to [facility] [address] [address]      Phone: [phone]   acetaminophen 325 MG tablet  amiodarone 200 MG tablet  amiodarone 400 MG tablet  aspirin 81 MG chewable tablet  furosemide 40 MG tablet  methocarbamol 500 MG tablet  metoprolol succinate  MG 24 hr tablet  oxyCODONE 5 MG tablet  potassium chloride med ER 20 MEQ CR tablet  senna-docusate 8.6-50 MG tablet       Some of these will need a paper prescription and others can be bought over the counter. Ask your nurse if you have questions.    You don't need a prescription for these medications  bisacodyl 10 MG suppository  magnesium hydroxide 400 MG/5ML suspension  polyethylene glycol 17 GM/Dose powder           Martin Pichardo K P, MD Matthew Nygren DNP APRN CNP CCRN   Cardiovascular Surgery   Available on Vocea or Secure Chat    Pager [phone]

## 2025-05-07 NOTE — PLAN OF CARE
Neuro: A&Ox4. Neuros intact. Calls appropriately.   Cardiac: Sinus arrythmia, HR 80-90's. Afebrile, VSS.   Endo: BG ACHS with Insulin coverage.  Respiratory: RA, satting >95%  GI/: Voiding spontaneously. BM x 2  Diet/appetite: Tolerating regular diet. Denies nausea.  Activity: Up with A01/SBA   Pain: Incisional pain- managed by current pain regimen  Skin: No new deficits noted.  Lines: R DL PICC- HL  L PIV x 1-SL  Replacements: K+ Mag++ Phos protocol ran this AM, Magnesium to be replaced.   Plan: Cont with POC and update Primary Team with changes  Tentative discharge to home today with HC OT/PT/Cardiac rehab

## 2025-05-07 NOTE — DISCHARGE INSTRUCTIONS
AFTER YOU GO HOME FROM YOUR HEART SURGERY  1.  Coronary artery bypass grafting x 3               - reversed saphenous vein graft to the right posterior descending coronary artery              - reversed saphenous vein graft to the ramus intermedius coronary artery              - reversed saphenous vein graft to the left anterior descending coronary artery              ** please note the left internal mammary artery was not used due to poor blood flow after transection and assessment  2.  Endoscopic vein harvest of the greater saphenous vein from the left lower extremity.  3. Transesophageal echocardiogram  On 4/29/25     You had a sternotomy, avoid lifting anything greater than ten pounds for 8 weeks after surgery, then 20 pounds for an additional 4 weeks.   Do not reach backwards or use arms to push out of chair.   Do not let people pull on your arms to assist with standing.   Avoid twisting or reaching too far across your body.    Do not sleep on your side for >12 weeks after surgery.  Avoid strenuous activities such as bowling, vacuuming, raking, shoveling, golf or tennis for 12 weeks after your surgery.   It is okay to resume sex if you feel comfortable in doing so. You may have to try different positions with your partner.    Splint your chest incision by hugging a pillow or bringing your arms across your chest when coughing or sneezing.     No driving for 4 weeks after surgery or while on pain medication.    Shower or wash your incisions daily with soap and water (or as instructed), then pat dry. Do not scrub at the incision, and do not let the water hit your chest directly until fully healed.  No baths or swimming for 1 month.   Cover chest tube sites with dry gauze until they stop draining, then leave open to air. It is normal for chest tube sites to drain yellowish/clear fluid for up to 2-3 weeks after surgery.   Watch for signs of infection: increased redness, tenderness, warmth or any drainage that  appears infected (pus like) or is persistent.  Also a temperature > 100.5 F or chills. Call your surgeon or primary care provider's office immediately if you see any of these signs.  Remove any skin glue left on incisions after 10-14 days. This will not affect your incision and can speed up healing.    Exercise is very important in your recovery. Please follow the guidelines set up for you in your cardiac rehab classes at the hospital. If outpatient cardiac rehab was ordered for you, we highly recommend you participate. If you have problems arranging your cardiac rehab, please call [phone] for all locations, with the exception of [place], please call [phone] and [place], please call [phone].    Avoid sitting for prolonged periods of time, try to walk every hour during the day. If you have a leg incision, elevate your leg often when you are not walking.    Take your blood pressure daily. If the top number is consistently <90 or >140 please call our office for further instructions.    Check your weight when you get home from the hospital and continue to check it daily for at least a month. If you notice a weight gain of 3 pounds in 1 day or 5 pounds in a week, call your surgeon or cardiologist.     Bowel activity may be slow after surgery. If necessary, you may take an over the counter laxative such as Milk of Magnesia or Miralax. You may have stool softeners prescribed (docusate sodium, Senokot). We recommend using stool softeners while using narcotics for pain (oxycodone/percocet, hydrocodone/vicodin, hydromorphone/dilaudid).      Wean OFF of narcotics (oxycodone, dilaudid, hydrocodone) as soon as possible. You should continue taking acetaminophen as long as you have any surgical pain as the first choice for pain control and add narcotics as necessary for pain to be tolerable.        REGARDING PRESCRIPTION REFILLS.  If you need a refill on your pain medication contact us to discuss your pain and a  possible one time refill.   All other medications will be adjusted, discontinued and re-filled by your primary care physician and/or your cardiologist as they were prior to your surgery. We have given you enough for one to three month with possibly one refill.    POST-OPERATIVE CLINIC VISITS  You have a follow up visit with CV[facility] Surgery Advance Care Practitioners at the [facility].  You will then return to the care of your primary provider and your cardiologist. Future medication refills should come from your PCP or Cardiologist.   You should see your primary care provider in 2-4 weeks after discharge.   It is important to see your cardiologist about 4-6 weeks after discharge.    If you do not hear from a  in 7 days, please call [phone] (choose option 1) and request to be seen with a general cardiologist or someone that you have seen in the past.   If there is a need to return to see CT Surgery please call our  at [phone].    SURGICAL QUESTIONS  Please call Jamaica Aguilera, Corinne Pickett, Annalisa Read, or Althea Zuniga with surgical recovery and medication questions, their phone numbers are listed below.  They will assist you with your needs and contact other surgery care team members as indicated.    On weekends or after hours, please call [phone] and ask the  to page the Cardiothoracic Surgery fellow on call.      Thank you,    Your Cardiothoracic Surgery Team   Jamaica Aguilera RN Care Coordinator -  [phone]  Corinne Pickett RN Care Coordinator - [phone]   Althea Zuniga, RN Care Coordinator - [phone]   Annalisa Read, RN Care Coordinator - [phone]  Candy Posey RN Care Coordinator - [phone]

## 2025-05-07 NOTE — PLAN OF CARE
Occupational Therapy Discharge Summary    Reason for therapy discharge:    Discharged to home with outpatient therapy.    Progress towards therapy goal(s). See goals on Care Plan in [facility] electronic health record for goal details.  Goals partially met.  Barriers to achieving goals:   discharge from facility.    Therapy recommendation(s):    Continued therapy is recommended.  Rationale/Recommendations:  OP OT to increased IND and safety with ADLs/IADLs.

## 2025-05-07 NOTE — PLAN OF CARE
Physical Therapy Discharge Summary    Reason for therapy discharge:    Discharged to home with outpatient therapy.    Progress towards therapy goal(s). See goals on Care Plan in [facility] electronic health record for goal details.  Goals partially met.  Barriers to achieving goals:   discharge from facility.    Therapy recommendation(s):    Continued therapy is recommended.  Rationale/Recommendations:  Recommend OP PT to progress safety and IND with functional mobility and improve CV strength and endurance for return to highest level of functional IND.

## 2025-05-08 ENCOUNTER — PATIENT OUTREACH (OUTPATIENT)
Dept: CARE COORDINATION | Facility: CLINIC | Age: 70
End: 2025-05-08
Payer: COMMERCIAL

## 2025-05-08 ASSESSMENT — ACTIVITIES OF DAILY LIVING (ADL): DEPENDENT_IADLS:: TRANSPORTATION

## 2025-05-08 NOTE — LETTER
[facility] CARE COORDINATION  [address] [facility] [address]   May 8, 2025    Rohit William  [address] [facility] [address]      Dear Rohit,    I am a clinic care coordinator who works with Martin Marsh MD with the [facility]. I wanted to thank you for spending the time to talk with me.  Below is a description of clinic care coordination and how I can further assist you.       The clinic care coordination team is made up of a registered nurse, , financial resource worker and community health worker who understand the health care system. The goal of clinic care coordination is to help you manage your health and improve access to the health care system. Our team works alongside your provider to assist you in determining your health and social needs. We can help you obtain health care and community resources, providing you with necessary information and education. We can work with you through any barriers and develop a care plan that helps coordinate and strengthen the communication between you and your care team.  Our services are voluntary and are offered without charge to you personally.    Please feel free to contact me with any questions or concerns regarding care coordination and what we can offer.      We are focused on providing you with the highest-quality healthcare experience possible.    Sincerely,     Lizeth Eric RN, BSN, PHN Care Coordinator  [place], [place], and [place]   Phone: [phone]     Additional Information: I have enclosed a copy of a 24 Hour Access Plan. This has helpful phone numbers for you to call when needed. Please keep this in an easy to access place to use as needed.

## 2025-05-08 NOTE — LETTER
[facility]  Patient Centered Plan of Care  About Me:        Patient Name:  Rohit William    YOB: 1955  Age:         70 year old   Garcia MRN:    [id number] Telephone Information:  Home Phone [phone]   Mobile [phone]       Address:  [address] [facility] [address] Email address:  [email or web address]      Emergency Contact(s)    Name Relationship Lgl Grd Work Phone Home Phone Mobile Phone   1. STEPHANIE WILLIAM Spouse No  [phone]   2. NILS SANCHEZ* Daughter    [phone]   3. SENA PANDYA Daughter    [phone]           Primary language:  English     needed? No   [employer]:  [phone] op. 1  Other communication barriers:None    Preferred Method of Communication:     Current living arrangement: I live in a private home with spouse    Mobility Status/ Medical Equipment: Independent w/Device        Health Maintenance  Health Maintenance Reviewed: Due/Overdue   Health Maintenance Due   Topic Date Due    NICOTINE/TOBACCO CESSATION COUNSELING Q 1 YR  Never done    DIABETIC FOOT EXAM  Never done    HEPATITIS C SCREENING  Never done    Pneumococcal Vaccine: 50+ Years (1 of 2 - PCV) Never done    DTAP/TDAP/TD IMMUNIZATION (1 - Tdap) Never done    ZOSTER IMMUNIZATION (1 of 2) Never done    RSV VACCINE (1 - Risk 60-74 years 1-dose series) Never done    LUNG CANCER SCREENING  01/29/2020    COVID-19 Vaccine (1 - 2024-25 season) Never done    EYE EXAM  05/17/2025          My Access Plan  Medical Emergency 911   Primary Clinic Line [facility] - [phone]   24 Hour Appointment Line [phone] or  [phone]) (toll-free)   24 Hour Nurse Line [phone] (toll-free)   Preferred Urgent Care [facility], [phone]     Preferred [facility]  [phone]     Preferred Pharmacy [facility] [place], [place] [address] [place] [facility]     Behavioral Health Crisis Line The National Suicide Prevention Lifeline at 1-949.173.6693 or Text/Call 988           My Care Team Members  Patient Care Team         Relationship Specialty Notifications Start End    Martin Marsh MD PCP - General Family Medicine  4/29/25     Phone: [phone] Fax: [phone] [address] [facility] [address]    Martin Marsh MD Assigned PCP   9/10/22     Phone: [phone] Fax: [phone] [address] [facility] [address]    Greta Shah Diabetes Educator   9/20/22     Phone: [phone] Fax: [phone]         [address] [facility] [address]    Lucina Xie MD Physician Cardiovascular Disease  12/10/24     Phone: [phone] Fax: [phone]         3 [facility] [address]    Lucina Xie MD Assigned Heart and Vascular Provider   12/23/24     Phone: [phone] Fax: [phone]         3 [facility] [address]    Miguel Barnes MD Assigned Heart and Vascular Surgical Provider   3/23/25     Phone: [phone] Fax: [phone]         7 [facility] [address]    Lizeth Eric RN Lead Care Coordinator Primary Care - CC Admissions 5/8/25     Phone: [phone] Fax: [phone]                    My Care Plans  Self Management and Treatment Plan    Care Plan  Care Plan: Medical       Problem: Post Op       Goal: I want to get back to normal life, and be out golfing.       Start Date: 5/8/2025 Expected End Date: 11/8/2025    Note:     Barriers: s/p coronary artery bypass 4/29/25  Strengths: patient is motivated to increase his overall health and wellbeing   Patient expressed understanding of goal: yes   Action steps to achieve this goal:  1. I will attend my post op visit on 5/23 and my primary care provider on 5/29, and my new cardiology visit 7/3.  2. I will take my medications as prescribed   3. I will attend cardiac rehab   4. I will be  a good self advocate and make my needs known.                                   Action Plans on File:                       Advance Care Plans/Directives:   Advanced Care Plan/Directives on file: No    Discussed with patient/caregiver(s): No data recorded             My Medical and Care Information  Problem List   Patient Active Problem List   Diagnosis    Diabetes mellitus, type 2 (H)    CAD (coronary artery disease)    Status post coronary angiogram    CAD, multiple vessel    Coronary artery disease      Current Medications:  Please refer to the most recent medication list provided to you by your medical team and reach out to your provider with any questions or to make any corrections.    Care Coordination Start Date: 5/8/2025   Frequency of Care Coordination: monthly, more frequently as needed     Form Last Updated: 05/08/2025

## 2025-05-08 NOTE — PROGRESS NOTES
Clinic Care Coordination Contact  Clinic Care Coordination Contact  OUTREACH with Post Discharge Assessment    Referral Information:  Referral Source: IP Handoff    Primary Diagnosis: Cardiovascular - other (s/p coronary artery bypass 4/29/25)    Chief Complaint   Patient presents with    Clinic Care Coordination - Post Hospital        [place] Utilization: Clinic Utilization  Difficulty keeping appointments:: No  Compliance Concerns: No  No-Show Concerns: No  No PCP office visit in Past Year: No  Utilization      No Show Count (past year)  1             ED Visits  0             Hospital Admissions  2                    Current as of: 5/8/2025  7:13 AM                Clinical Concerns:  Current Medical Concerns:  CC RN contacted patient, introduced self, role, care coordination program and reason for call.    Patient was at [facility]  4/29/2025 - 5/7/2025 (8 days) patients primary diagnosis is Multivessel CAD s/p coronary artery bypass graft x3 (Vein to RPDA, vein to Ramus, vein to LAD). Patient agreeable to CC program. CC goal developed with patient.   Current Behavioral Concerns: none noted while talking with patient via phone.     Education Provided to patient: Per his IP AVS.    Pain  Pain (GOAL):: No  Health Maintenance Reviewed: Due/Overdue   Health Maintenance Due   Topic Date Due    NICOTINE/TOBACCO CESSATION COUNSELING Q 1 YR  Never done    DIABETIC FOOT EXAM  Never done    HEPATITIS C SCREENING  Never done    Pneumococcal Vaccine: 50+ Years (1 of 2 - PCV) Never done    DTAP/TDAP/TD IMMUNIZATION (1 - Tdap) Never done    ZOSTER IMMUNIZATION (1 of 2) Never done    RSV VACCINE (1 - Risk 60-74 years 1-dose series) Never done    LUNG CANCER SCREENING  01/29/2020    COVID-19 Vaccine (1 - 2024-25 season) Never done    EYE EXAM  05/17/2025      Clinical Pathway: None    Transitions of Care Outreach    Most Recent Admission Date: 4/29/2025   Most Recent Admission  "Diagnosis: CAD (coronary artery disease) - I25.10  Coronary artery disease - I25.10     Most Recent Discharge Date: 5/7/2025   Most Recent Discharge Diagnosis: Coronary artery disease - I25.10  Cerebrovascular accident (CVA) due to bilateral embolism of middle cerebral arteries (H) - I63.413  CAD, multiple vessel - I25.10  S/P CABG (coronary artery bypass graft) - Z95.1     Transitions of Care Assessment    Discharge Assessment  How are you doing now that you are home?: \"Pretty good\"  How are your symptoms? (Red Flag symptoms escalate to triage hotline per guidelines): Unchanged  Do you know how to contact your clinic care team if you have future questions or changes to your health status? : Yes  Does the patient have their discharge instructions? : Yes  Does the patient have questions regarding their discharge instructions? : No  Were you started on any new medications or were there changes to any of your previous medications? : Yes  Does the patient have all of their medications?: Yes  Do you have questions regarding any of your medications? : No (MTM referral discussed, pt declined)  Do you have all of your needed medical supplies or equipment (DME)?  (i.e. oxygen tank, CPAP, cane, etc.): Yes         Post-op (Clinicians Only)  Did the patient have surgery or a procedure: Yes  Incision: healing  Drainage: No  Bleeding: none  Fever: No  Chills: No  Redness: No  Warmth: No  Swelling: No  Incision site pain: No  Eating & Drinking: eating and drinking without complaints/concerns  PO Intake: other (cardiac / low carb)  Bowel Function: normal  Date of last BM: 05/07/25  Urinary Status: voiding without complaint/concerns    Care Management       Care Mgmt General Assessment  Referral  Referral Source: IP Handoff  Health Care Home/Utilization  Preferred Hospital: [facility]  [phone]  Preferred Urgent Care: [facility] [phone]  Living " Situation  Current living arrangement:: I live in a private home with spouse  Type of residence:: Private home - stairs  Resources  Patient receiving home care services:: No  Community Resources: None  Supplies Currently Used at Home: Diabetic Supplies  Equipment Currently Used at Home: walker, rolling, shower chair  Referrals Placed: None  Employment Status: employed part-time  Psychosocial  Pentecostal or spiritual beliefs that impact treatment:: No  Mental health DX:: No  Mental health management concern (GOAL):: No  Chemical Dependency Status: No Current Concerns  Informal Support system:: Children, Spouse  Functional Status  Dependent ADLs:: Ambulation-walker  Dependent IADLs:: Transportation  Bed or wheelchair confined:: No  Mobility Status: Independent w/Device  Fallen 2 or more times in the past year?: No  Any fall with injury in the past year?: No  Advance Care Plan/Directive  Advanced Care Plans/Directives on file:: No and     Care Mgmt Encounter Assessment  Preventative Care  Routine Health maintenance Reviewed: Due/Overdue   Clinic Utilization  Difficulty keeping appointments:: No  Compliance Concerns: No  No-Show Concerns: No  No PCP office visit in Past Year: No  Transportation  Transportation means:: Family, Regular car (pt in his post op period, and wife to drive PRN. Otherwise, pt does drive.)     Primary Diagnosis  Primary Diagnosis: Cardiovascular - other (s/p coronary artery bypass 4/29/25)  Barriers in Communication  Other concerns: None  Pain  Pain (GOAL):: No  Medication Review  Medication adherence problem (GOAL):: No  Knowledgeable about how to use meds:: Yes  Medication side effects suspected:: No  Diet/Exercise/Sleep  Diet:: Other (cardiac and low carb)  Inadequate nutrition (GOAL):: No  Tube Feeding: No  Inadequate activity/exercise (GOAL):: No  Significant changes in sleep pattern (GOAL): No    Medication Management:  Medication review status: Medications reviewed and no changes reported  per patient.            Functional Status:  Dependent ADLs:: Ambulation-walker  Dependent IADLs:: Transportation  Bed or wheelchair confined:: No  Mobility Status: Independent w/Device  Fallen 2 or more times in the past year?: No  Any fall with injury in the past year?: No    Living Situation:  Current living arrangement:: I live in a private home with spouse  Type of residence:: Private home - stairs    Lifestyle & Psychosocial Needs:    Social Drivers of Health     Food Insecurity: Low Risk  (4/29/2025)    Food Insecurity     Within the past 12 months, did you worry that your food would run out before you got money to buy more?: No     Within the past 12 months, did the food you bought just not last and you didn t have money to get more?: No   Depression: Not at risk (4/3/2025)    PHQ-2     PHQ-2 Score: 0   Housing Stability: High Risk (4/29/2025)    Housing Stability     Do you have housing? : No     Are you worried about losing your housing?: No   Tobacco Use: High Risk (4/3/2025)    Patient History     Smoking Tobacco Use: Some Days     Smokeless Tobacco Use: Never     Passive Exposure: Not on file   Financial Resource Strain: Low Risk  (4/29/2025)    Financial Resource Strain     Within the past 12 months, have you or your family members you live with been unable to get utilities (heat, electricity) when it was really needed?: No   Alcohol Use: Not At Risk (1/10/2019)    AUDIT-C     Frequency of Alcohol Consumption: Monthly or less     Average Number of Drinks: 1 or 2     Frequency of Binge Drinking: Never   Transportation Needs: Low Risk  (4/29/2025)    Transportation Needs     Within the past 12 months, has lack of transportation kept you from medical appointments, getting your medicines, non-medical meetings or appointments, work, or from getting things that you need?: No   Physical Activity: Insufficiently Active (10/30/2024)    Exercise Vital Sign     Days of Exercise per Week: 1 day     Minutes of  Exercise per Session: 20 min   Interpersonal Safety: Low Risk  (4/29/2025)    Interpersonal Safety     Do you feel physically and emotionally safe where you currently live?: Yes     Within the past 12 months, have you been hit, slapped, kicked or otherwise physically hurt by someone?: No     Within the past 12 months, have you been humiliated or emotionally abused in other ways by your partner or ex-partner?: No   Stress: No Stress Concern Present (10/30/2024)    [place] [employer] - Occupational Stress Questionnaire     Feeling of Stress : Not at all   Social Connections: Unknown (10/30/2024)    Social Connection and Isolation Panel [NHANES]     Frequency of Communication with Friends and Family: Not on file     Frequency of Social Gatherings with Friends and Family: More than three times a week     Attends Latter-day Services: Not on file     Active Member of Clubs or Organizations: Not on file     Attends Club or Organization Meetings: Not on file     Marital Status: Not on file   Health Literacy: Not on file     Diet:: Other (cardiac and low carb)  Inadequate nutrition (GOAL):: No  Tube Feeding: No  Inadequate activity/exercise (GOAL):: No  Significant changes in sleep pattern (GOAL): No  Transportation means:: Family, Regular car (pt in his post op period, and wife to drive PRN. Otherwise, pt does drive.)     Latter-day or spiritual beliefs that impact treatment:: No  Mental health DX:: No  Mental health management concern (GOAL):: No  Chemical Dependency Status: No Current Concerns  Informal Support system:: Children, Spouse           Resources and Interventions:  Current Resources:      Community Resources: None  Supplies Currently Used at Home: Diabetic Supplies  Equipment Currently Used at Home: walker, rolling, shower chair  Employment Status: employed part-time         Advance Care Plan/Directive  Advanced Care Plans/Directives on file:: No    Referrals Placed: None     Care Plan:    Care Plan: Medical       Problem: Post Op       Goal: I want to get back to normal life, and be out golfing.       Start Date: 5/8/2025 Expected End Date: 11/8/2025    Note:     Barriers: s/p coronary artery bypass 4/29/25  Strengths: patient is motivated to increase his overall health and wellbeing   Patient expressed understanding of goal: yes   Action steps to achieve this goal:  1. I will attend my post op visit on 5/23 and my primary care provider on 5/29, and my new cardiology visit 7/3.  2. I will take my medications as prescribed   3. I will attend cardiac rehab   4. I will be a good self advocate and make my needs known.                                   Patient/Caregiver understanding: yes     Outreach Frequency: monthly, more frequently as needed  Future Appointments                In 2 weeks UC CVTS [facility]    In 3 weeks Martin Marsh MD [facility]    In 1 month GEOVANNA Linares MD [facility] CL[facility]            Follow up Plan     Discharge Follow-Up  Discharge follow up appointment scheduled in alignment with recommended follow up timeframe or Transitions of Risk Category? (Low = within 30 days; Moderate= within 14 days; High= within 7 days): Yes  Discharge Follow Up Appointment Date: 05/23/25  Discharge Follow Up Appointment Scheduled with?: Specialty Care Provider    Future Appointments   Date Time Provider Department Center   5/23/2025  2:15 PM [facility]   5/29/2025  2:30 PM [facility]   7/3/2025  9:00 AM GEOVANNA Linares MD [facility]       Outpatient Plan as outlined on AVS reviewed with patient.    For any urgent concerns, please contact our 24 hour nurse triage line: [phone] ([phone])           1. Patient verbalized good understanding of Care Coordination Program, CC goal(s) identified at this time.   2. Patient to  follow his IP AVS.   3. Patient centered care plan, and introduction letter sent to patient.  4. CC RN will reach out to patient in 1 month, if additional need(s) arise patient encouraged to contact CC RN or care team directly sooner.     Lizeth Eric RN, BSN, PHN Care Coordinator  [place], [place], and [facility]   Phone: [phone]

## 2025-05-11 ENCOUNTER — ORDERS ONLY (AUTO-RELEASED) (OUTPATIENT)
Dept: CARDIOLOGY | Facility: CLINIC | Age: 70
End: 2025-05-11
Payer: COMMERCIAL

## 2025-05-11 DIAGNOSIS — I63.413 CEREBROVASCULAR ACCIDENT (CVA) DUE TO BILATERAL EMBOLISM OF MIDDLE CEREBRAL ARTERIES (H): ICD-10-CM

## 2025-05-12 ENCOUNTER — PATIENT OUTREACH (OUTPATIENT)
Dept: CARE COORDINATION | Facility: CLINIC | Age: 70
End: 2025-05-12
Payer: COMMERCIAL

## 2025-05-17 ENCOUNTER — MYC REFILL (OUTPATIENT)
Dept: FAMILY MEDICINE | Facility: CLINIC | Age: 70
End: 2025-05-17
Payer: COMMERCIAL

## 2025-05-17 DIAGNOSIS — I25.10 CAD, MULTIPLE VESSEL: ICD-10-CM

## 2025-05-17 DIAGNOSIS — E11.69 TYPE 2 DIABETES MELLITUS WITH OTHER SPECIFIED COMPLICATION, UNSPECIFIED WHETHER LONG TERM INSULIN USE (H): ICD-10-CM

## 2025-05-19 RX ORDER — ATORVASTATIN CALCIUM 80 MG/1
80 TABLET, FILM COATED ORAL DAILY
Qty: 90 TABLET | Refills: 2 | Status: SHIPPED | OUTPATIENT
Start: 2025-05-19

## 2025-05-19 RX ORDER — BLOOD SUGAR DIAGNOSTIC
STRIP MISCELLANEOUS
Qty: 100 STRIP | Refills: 2 | Status: SHIPPED | OUTPATIENT
Start: 2025-05-19

## 2025-05-20 DIAGNOSIS — I63.413 CEREBROVASCULAR ACCIDENT (CVA) DUE TO BILATERAL EMBOLISM OF MIDDLE CEREBRAL ARTERIES (H): Primary | ICD-10-CM

## 2025-05-29 ENCOUNTER — OFFICE VISIT (OUTPATIENT)
Dept: FAMILY MEDICINE | Facility: CLINIC | Age: 70
End: 2025-05-29
Payer: COMMERCIAL

## 2025-05-29 VITALS
HEIGHT: 73 IN | TEMPERATURE: 97.5 F | DIASTOLIC BLOOD PRESSURE: 77 MMHG | OXYGEN SATURATION: 97 % | WEIGHT: 223 LBS | HEART RATE: 69 BPM | SYSTOLIC BLOOD PRESSURE: 133 MMHG | BODY MASS INDEX: 29.55 KG/M2 | RESPIRATION RATE: 16 BRPM

## 2025-05-29 DIAGNOSIS — D64.9 ANEMIA, UNSPECIFIED TYPE: ICD-10-CM

## 2025-05-29 DIAGNOSIS — E11.69 TYPE 2 DIABETES MELLITUS WITH OTHER SPECIFIED COMPLICATION, UNSPECIFIED WHETHER LONG TERM INSULIN USE (H): Primary | ICD-10-CM

## 2025-05-29 DIAGNOSIS — Z95.1 S/P CABG (CORONARY ARTERY BYPASS GRAFT): ICD-10-CM

## 2025-05-29 LAB
EST. AVERAGE GLUCOSE BLD GHB EST-MCNC: 123 MG/DL
HBA1C MFR BLD: 5.9 % (ref 0–5.6)
HGB BLD-MCNC: 11.8 G/DL (ref 13.3–17.7)
MCV RBC AUTO: 88 FL (ref 78–100)

## 2025-05-29 NOTE — PROGRESS NOTES
ASSESSMENT / PLAN:  (E11.69) Type 2 diabetes mellitus with other specified complication, unspecified whether long term insulin use (H)  (primary encounter diagnosis)  Comment: stable/improving  Plan: Hemoglobin A1c        Recheck in 4 months  Continue meds/diet/exercise. Call/email with questions/concerns      (D64.9) Anemia, unspecified type  Comment: improving.  Plan: Hemoglobin        More iron in diet. Recheck in 4 months  Sooner if worsening fatigue/stool changes. Call/email with questions/concerns      (Z95.1) S/P CABG (coronary artery bypass graft)  Comment: overall doing ok. No MI  Plan: follow-up per cardiology.   Worsening chest pain or shortness of breath to er.     Lucas Bee is a 70 year old, presenting for the following health issues:    Follow-up hospitalization for CAD s/p CABGx3. Patient had some left sided weakness and seeing neurology for CVA. Had a.fib RVR - resolved. Given amiodarone/asa/lasix and metoprolol. Robaxin and oxycodone prn for pan. Hyzaar stopped. Anemia from blood loss. No black/bloody stools. No iron pills. Eats meat. Renal panel/mg ok at discharge.   Outside blood pressure running ok. No shortness of breath. No fevers or chills.   Cardiac rehab next month. No pus/discharge. Blood sugars 120's. Was started on januvia 3 months ago and jardiance = rash. Higher metformin = loose stools.   Follow-up dm, htn and high cholesterol     Hospital F/U (Heart issues)      5/29/2025     2:20 PM   Additional Questions   Roomed by Rosy BAKER          Hospital Follow-up Visit:    Hospital/Nursing Home/IP Rehab Facility: [facility]  Most Recent Admission Date: 4/29/2025   Most Recent Admission Diagnosis: CAD (coronary artery disease) - I25.10  Coronary artery disease - I25.10     Most Recent Discharge Date: 5/7/2025   Most Recent Discharge Diagnosis: Coronary artery disease - I25.10  Cerebrovascular accident (CVA) due to bilateral embolism  "of middle cerebral arteries (H) - I63.413  CAD, multiple vessel - I25.10  S/P CABG (coronary artery bypass graft) - Z95.1   Do you have any other stressors you would like to discuss with your provider? No    Problems taking medications regularly:  None  Medication changes since discharge: None  Problems adhering to non-medication therapy:  None    Summary of hospitalization:  CareEverywhere information obtained and reviewed  Diagnostic Tests/Treatments reviewed.  Follow up needed: none  Other Healthcare Providers Involved in Patient s Care:         None  Update since discharge: improved.         Plan of care communicated with patient                 Objective    There were no vitals taken for this visit.  There is no height or weight on file to calculate BMI.  BP (!) 149/80   Pulse 69   Temp 97.5  F (36.4  C) (Tympanic)   Resp 16   Ht 1.854 m (6' 1\")   Wt 101.2 kg (223 lb)   SpO2 97%   BMI 29.42 kg/m      Physical Exam   GENERAL: alert and no distress  EYES: Eyes grossly normal to inspection, PERRL and conjunctivae and sclerae normal  NECK: no adenopathy, no asymmetry, masses, or scars  RESP: lungs clear to auscultation - no rales, rhonchi or wheezes  CV: regular rate and rhythm, normal S1 S2, no S3 or S4, no murmur, click or rub, no peripheral edema   ABDOMEN: soft, nontender, without hepatosplenomegaly or masses and bowel sounds normal  MS: no gross musculoskeletal defects noted, no edema  SKIN: incisions clean  NEURO: Normal strength and tone, mentation intact and speech normal  PSYCH: mentation appears normal, affect normal/bright            Signed Electronically by: Martin Marsh MD    "

## 2025-06-07 LAB — CV ZIO PRELIM RESULTS: NORMAL

## 2025-07-02 ENCOUNTER — PATIENT OUTREACH (OUTPATIENT)
Dept: CARE COORDINATION | Facility: CLINIC | Age: 70
End: 2025-07-02
Payer: COMMERCIAL

## 2025-07-02 NOTE — PROGRESS NOTES
Clinic Care Coordination Contact  [facility]/Voicemail    Clinical Data: Care Coordinator Outreach    Outreach Documentation Number of Outreach Attempt   7/2/2025   2:28 PM 1        Left message on patient's voicemail with call back information and requested return call.      Plan: Care Coordinator will try to reach patient again in 10 business days.    Lizeth Eric RN, BSN, PHN Care Coordinator  [place], [place], and [facility]   Phone: [phone]

## 2025-07-03 ENCOUNTER — OFFICE VISIT (OUTPATIENT)
Dept: CARDIOLOGY | Facility: CLINIC | Age: 70
End: 2025-07-03
Payer: COMMERCIAL

## 2025-07-03 VITALS
SYSTOLIC BLOOD PRESSURE: 154 MMHG | BODY MASS INDEX: 29 KG/M2 | HEART RATE: 71 BPM | OXYGEN SATURATION: 95 % | WEIGHT: 219.8 LBS | DIASTOLIC BLOOD PRESSURE: 96 MMHG

## 2025-07-03 DIAGNOSIS — I25.10 CORONARY ARTERY DISEASE INVOLVING NATIVE CORONARY ARTERY OF NATIVE HEART WITHOUT ANGINA PECTORIS: ICD-10-CM

## 2025-07-03 DIAGNOSIS — E78.2 MIXED HYPERLIPIDEMIA: ICD-10-CM

## 2025-07-03 DIAGNOSIS — Z95.1 S/P CABG (CORONARY ARTERY BYPASS GRAFT): Primary | ICD-10-CM

## 2025-07-03 RX ORDER — LOSARTAN POTASSIUM 25 MG/1
25 TABLET ORAL DAILY
Qty: 90 TABLET | Refills: 3 | Status: SHIPPED | OUTPATIENT
Start: 2025-07-03

## 2025-07-03 NOTE — LETTER
7/3/2025      RE: Rohit William  [address] [facility] [id number]       Dear Colleague,    Thank you for the opportunity to participate in the care of your patient, Rohit William, at the [facility]. Please see a copy of my visit note below.       SUBJECTIVE:  Rohit William is a 70 year old male who presents for post CABG follow-up.  Patient had CABG x 3 using saphenous vein graft to RPDA, ramus intermedius and LAD on 4/29/2025.  Postop course was complicated by atrial fibrillation and also left-sided weakness due to stroke.  Patient was treated with this more course of amiodarone and discharged home.  Since discharge patient is doing well.  He had no cardiac complaints today apart from some soreness.  His neurodeficit completely recovered.  Patient with strong family history of premature coronary artery disease including his father and 2 brothers.  Because of family history patient had evaluation which led to the coronary angiogram and bypass surgery.  Cardiac risk factors are hypertension, hyperlipidemia and type 2 diabetes.  Patient is scheduled to start cardiac rehab next week.  Patient Active Problem List    Diagnosis Date Noted     Coronary artery disease 04/29/2025     Priority: Medium     CAD (coronary artery disease) 02/13/2025     Priority: Medium     Status post coronary angiogram 02/13/2025     Priority: Medium     CAD, multiple vessel 02/13/2025     Priority: Medium     Type 2 diabetes mellitus with other specified complication, unspecified whether long term insulin use (H) 10/31/2024     Priority: Medium    .  Current Outpatient Medications   Medication Sig Dispense Refill     acetaminophen (TYLENOL) 325 MG tablet Take 3 tablets (975 mg) by mouth every 6 hours as needed for mild pain. 180 tablet 0     aspirin (ASA) 81 MG chewable tablet Take 2 tablets (162 mg) by mouth daily. 180 tablet 0     atorvastatin  (LIPITOR) 80 MG tablet Take 1 tablet (80 mg) by mouth daily. 90 tablet 2     bisacodyl (DULCOLAX) 10 MG suppository Place 1 suppository (10 mg) rectally daily as needed for constipation (Use if magnesium hydroxide (MILK of MAGNESIA) is not effective after 24 hours. May discontinue if patient having bowel movement.).       blood glucose (NO BRAND SPECIFIED) lancets standard Use to test blood sugar one time daily or as directed. 100 each 2     blood glucose (ONETOUCH ULTRA) test strip Use to test blood sugar 1 times daily or as directed. 100 strip 2     blood glucose monitoring (ONE TOUCH ULTRA 2) meter device kit Use to test blood sugar one time daily or as directed. 1 kit 0     fish oil-omega-3 fatty acids 1000 MG capsule Take 2 g by mouth daily.       Lancets (ONETOUCH DELICA PLUS [id number]) MISC USE TO TEST BLOOD SUGAR ONE TIME DAILY OR AS DIRECTED.       magnesium oxide (MAG-OX) 400 MG tablet Take 400 mg by mouth daily.       metFORMIN (GLUCOPHAGE XR) 500 MG 24 hr tablet TAKE 1 TAB  BY MOUTH DAILY (WITH BREAKFAST) AND 1 TAB DAILY (WITH DINNER) 180 tablet 0     metoprolol succinate ER (TOPROL XL) 100 MG 24 hr tablet Take 1 tablet (100 mg) by mouth daily. 60 tablet 0     sitagliptin (JANUVIA) 25 MG tablet Take 1 tablet (25 mg) by mouth daily. 30 tablet 3     zinc gluconate 50 MG tablet Take 50 mg by mouth daily.       amiodarone (PACERONE) 200 MG tablet Take 1 tablet (200 mg) by mouth daily. (Patient not taking: Reported on 7/3/2025) 30 tablet 0     amiodarone (PACERONE) 400 MG tablet Take 1 tablet (400 mg) by mouth 2 times daily for 2 days. (Patient not taking: Reported on 7/3/2025) 3 tablet 0     magnesium hydroxide (MILK OF MAGNESIA) 400 MG/5ML suspension Take 30 mLs by mouth daily as needed for constipation (Use if polyethylene glycol (Miralax) is not effective after 24 hours.).       methocarbamol (ROBAXIN) 500 MG tablet Take 1 tablet (500 mg) by mouth 3 times daily. (Patient not taking: Reported on 7/3/2025)  30 tablet 0     polyethylene glycol (MIRALAX) 17 GM/Dose powder Take 17 g by mouth daily as needed for constipation. (Patient not taking: Reported on 7/3/2025)       senna-docusate (SENOKOT-S/PERICOLACE) 8.6-50 MG tablet Take 2 tablets by mouth 2 times daily as needed for constipation. (Patient not taking: Reported on 7/3/2025) 180 tablet 0     No current facility-administered medications for this visit.     Past Medical History:   Diagnosis Date     CAD (coronary artery disease)      Diabetes (H)      Hypertension      Past Surgical History:   Procedure Laterality Date     BYPASS GRAFT ARTERY CORONARY N/A 04/29/2025    Procedure: Median sternotomy, on cardiopulmonary pump, coronary artery bypass graft x 3, left endoscopic harvest of greater saphenous vein, left internal mammary harvest, intraoperative transesophageal echocardiogram by anesthesia;  Surgeon: Miguel Barnes MD;  Location: [facility]     COLONOSCOPY N/A 12/07/2022    Procedure: COLONOSCOPY, FLEXIBLE, WITH LESION REMOVAL USING SNARE;  Surgeon: Gomez Zurita MD;  Location: [facility]     COLONOSCOPY WITH CO2 INSUFFLATION N/A 12/07/2022    Procedure: COLONOSCOPY, WITH CO2 INSUFFLATION;  Surgeon: Gomez Zurita MD;  Location: [facility] OR     CV CORONARY ANGIOGRAM N/A 02/13/2025    Procedure: Coronary Angiogram;  Surgeon: Carlos Lepe MD;  Location:  HEART CARDIAC CATH LAB     CV INSTANTANEOUS WAVE-FREE RATIO N/A 02/13/2025    Procedure: Instantaneous Wave-Free Ratio;  Surgeon: Carlos Lepe MD;  Location:  HEART CARDIAC CATH LAB     GENITOURINARY SURGERY  Kidney Stones    Vasectomy     ORTHOPEDIC SURGERY Left 2003     PICC DOUBLE LUMEN PLACEMENT Right 05/03/2025    Basilic Vein 5F DL 45 cm, 1 cm external catheter length     Allergies   Allergen Reactions     Jardiance [Empagliflozin]      rash     Social History     Socioeconomic History     Marital status:      Spouse name: Not on file      Number of children: Not on file     Years of education: Not on file     Highest education level: Not on file   Occupational History     Not on file   Tobacco Use     Smoking status: Former     Types: Cigars     Quit date: 2025     Years since quittin.3     Smokeless tobacco: Never     Tobacco comments:     Rare cigar   Vaping Use     Vaping status: Never Used   Substance and Sexual Activity     Alcohol use: Not Currently     Comment: 1-2 drinks month     Drug use: Not Currently     Types: Marijuana     Sexual activity: Not Currently     Partners: Female     Birth control/protection: Post-menopausal, Male Surgical   Other Topics Concern     Parent/sibling w/ CABG, MI or angioplasty before 65F 55M? Yes   Social History Narrative     Not on file     Social Drivers of Health     Financial Resource Strain: Low Risk  (2025)    Financial Resource Strain      Within the past 12 months, have you or your family members you live with been unable to get utilities (heat, electricity) when it was really needed?: No   Food Insecurity: Low Risk  (2025)    Food Insecurity      Within the past 12 months, did you worry that your food would run out before you got money to buy more?: No      Within the past 12 months, did the food you bought just not last and you didn t have money to get more?: No   Transportation Needs: Low Risk  (2025)    Transportation Needs      Within the past 12 months, has lack of transportation kept you from medical appointments, getting your medicines, non-medical meetings or appointments, work, or from getting things that you need?: No   Physical Activity: Insufficiently Active (10/30/2024)    Exercise Vital Sign      Days of Exercise per Week: 1 day      Minutes of Exercise per Session: 20 min   Stress: No Stress Concern Present (10/30/2024)    [employer] - Occupational Stress Questionnaire      Feeling of Stress : Not at all   Social Connections: Unknown  (10/30/2024)    Social Connection and Isolation Panel [NHANES]      Frequency of Communication with Friends and Family: Not on file      Frequency of Social Gatherings with Friends and Family: More than three times a week      Attends Synagogue Services: Not on file      Active Member of Clubs or Organizations: Not on file      Attends Club or Organization Meetings: Not on file      Marital Status: Not on file   Interpersonal Safety: Low Risk  (4/29/2025)    Interpersonal Safety      Do you feel physically and emotionally safe where you currently live?: Yes      Within the past 12 months, have you been hit, slapped, kicked or otherwise physically hurt by someone?: No      Within the past 12 months, have you been humiliated or emotionally abused in other ways by your partner or ex-partner?: No   Housing Stability: High Risk (4/29/2025)    Housing Stability      Do you have housing? : No      Are you worried about losing your housing?: No     Family History   Problem Relation Age of Onset     Alzheimer Disease Mother      Heart Disease Father      Coronary Artery Disease Father      Diabetes Sister      Pulmonary Embolism Sister      Coronary Artery Disease Brother      Diabetes Brother      Anesthesia Reaction No family hx of           REVIEW OF SYSTEMS:  General: negative, fever, chills, night sweats  Skin: negative, acne, rash, and scaling  Eyes: negative, double vision, eye pain, and photophobia  Ears/Nose/Throat: negative, nasal congestion, and purulent rhinorrhea  Respiratory: No dyspnea on exertion, No cough, No hemoptysis, and negative  Cardiovascular: negative, palpitations, tachycardia, irregular heart beat, chest pain, exertional chest pain or pressure, paroxysmal nocturnal dyspnea, dyspnea on exertion, and orthopnea       OBJECTIVE:  Blood pressure (!) 154/96, pulse 71, weight 99.7 kg (219 lb 12.8 oz), SpO2 95%.  General Appearance: healthy, alert, active, and no distress  Head: Normocephalic. No masses,  lesions, tenderness or abnormalities  Eyes: conjuctiva clear, PERRL, EOM intact  Ears: External ears normal. Canals clear. TM's normal.  Nose: Nares normal  Mouth: normal  Neck: Supple, no cervical adenopathy, no thyromegaly  Lungs: clear to auscultation  Cardiac: regular rate and rhythm, normal S1 and S2, no murmur       ASSESSMENT/PLAN:  Patient here for post CABG follow-up.  Had CABG x 3 using saphenous vein graft to LAD, ramus intermedius and RPDA on 4/29/2025.  Postop course was complicated by atrial fibrillation and stroke with left-sided weakness.  A-fib reverted to sinus rhythm on amiodarone.  Patient was discharged home on a small course of amiodarone which she stopped currently.  No complaint of palpitations.  Patient's neurodeficit completely recovered.  Today patient had no cardiac complaints overall he is doing very well.  Starting cardiac rehab next week.  Currently patient is taking Lipitor 80 mg daily and 10 mg daily.  Advised to discontinue the 10 mg Lipitor.  He will continue with 80 mg at night.  Patient is also taking 2 aspirin tablets.  Advised to take 81 mg daily.  Patient's blood pressure is elevated today.  Will start him back on his prior losartan at 25 mg daily.  Patient will monitor his blood pressure at home.  His wounds are well-healed with no discharge or redness.  Patient known to have normal biventricular function, no significant valvular abnormality and no regional wall motion abnormality.  Patient had a Zio patch.  This did not show any recurrence of atrial fibrillation.  Patient will return to clinic in 1 year for a follow-up with a repeat echocardiogram.  Total visit duration 45 minutes.  This includes face-to-face interview, physical exam, chart review, review of operative records inpatient records, prior EKGs echocardiogram and documentation.    Please do not hesitate to contact me if you have any questions/concerns.     Sincerely,     GEOVANNA Linares MD

## 2025-07-03 NOTE — PROGRESS NOTES
SUBJECTIVE:  Rohit William is a 70 year old male who presents for post CABG follow-up.  Patient had CABG x 3 using saphenous vein graft to RPDA, ramus intermedius and LAD on 4/29/2025.  Postop course was complicated by atrial fibrillation and also left-sided weakness due to stroke.  Patient was treated with this more course of amiodarone and discharged home.  Since discharge patient is doing well.  He had no cardiac complaints today apart from some soreness.  His neurodeficit completely recovered.  Patient with strong family history of premature coronary artery disease including his father and 2 brothers.  Because of family history patient had evaluation which led to the coronary angiogram and bypass surgery.  Cardiac risk factors are hypertension, hyperlipidemia and type 2 diabetes.  Patient is scheduled to start cardiac rehab next week.  Patient Active Problem List    Diagnosis Date Noted    Coronary artery disease 04/29/2025     Priority: Medium    CAD (coronary artery disease) 02/13/2025     Priority: Medium    Status post coronary angiogram 02/13/2025     Priority: Medium    CAD, multiple vessel 02/13/2025     Priority: Medium    Type 2 diabetes mellitus with other specified complication, unspecified whether long term insulin use (H) 10/31/2024     Priority: Medium    .  Current Outpatient Medications   Medication Sig Dispense Refill    acetaminophen (TYLENOL) 325 MG tablet Take 3 tablets (975 mg) by mouth every 6 hours as needed for mild pain. 180 tablet 0    aspirin (ASA) 81 MG chewable tablet Take 2 tablets (162 mg) by mouth daily. 180 tablet 0    atorvastatin (LIPITOR) 80 MG tablet Take 1 tablet (80 mg) by mouth daily. 90 tablet 2    bisacodyl (DULCOLAX) 10 MG suppository Place 1 suppository (10 mg) rectally daily as needed for constipation (Use if magnesium hydroxide (MILK of MAGNESIA) is not effective after 24 hours. May discontinue if patient having bowel movement.).      blood glucose (NO BRAND  SPECIFIED) lancets standard Use to test blood sugar one time daily or as directed. 100 each 2    blood glucose (ONETOUCH ULTRA) test strip Use to test blood sugar 1 times daily or as directed. 100 strip 2    blood glucose monitoring (ONE TOUCH ULTRA 2) meter device kit Use to test blood sugar one time daily or as directed. 1 kit 0    fish oil-omega-3 fatty acids 1000 MG capsule Take 2 g by mouth daily.      Lancets (ONETOUCH DELICA PLUS [id number]) MISC USE TO TEST BLOOD SUGAR ONE TIME DAILY OR AS DIRECTED.      magnesium oxide (MAG-OX) 400 MG tablet Take 400 mg by mouth daily.      metFORMIN (GLUCOPHAGE XR) 500 MG 24 hr tablet TAKE 1 TAB  BY MOUTH DAILY (WITH BREAKFAST) AND 1 TAB DAILY (WITH DINNER) 180 tablet 0    metoprolol succinate ER (TOPROL XL) 100 MG 24 hr tablet Take 1 tablet (100 mg) by mouth daily. 60 tablet 0    sitagliptin (JANUVIA) 25 MG tablet Take 1 tablet (25 mg) by mouth daily. 30 tablet 3    zinc gluconate 50 MG tablet Take 50 mg by mouth daily.      amiodarone (PACERONE) 200 MG tablet Take 1 tablet (200 mg) by mouth daily. (Patient not taking: Reported on 7/3/2025) 30 tablet 0    amiodarone (PACERONE) 400 MG tablet Take 1 tablet (400 mg) by mouth 2 times daily for 2 days. (Patient not taking: Reported on 7/3/2025) 3 tablet 0    magnesium hydroxide (MILK OF MAGNESIA) 400 MG/5ML suspension Take 30 mLs by mouth daily as needed for constipation (Use if polyethylene glycol (Miralax) is not effective after 24 hours.).      methocarbamol (ROBAXIN) 500 MG tablet Take 1 tablet (500 mg) by mouth 3 times daily. (Patient not taking: Reported on 7/3/2025) 30 tablet 0    polyethylene glycol (MIRALAX) 17 GM/Dose powder Take 17 g by mouth daily as needed for constipation. (Patient not taking: Reported on 7/3/2025)      senna-docusate (SENOKOT-S/PERICOLACE) 8.6-50 MG tablet Take 2 tablets by mouth 2 times daily as needed for constipation. (Patient not taking: Reported on 7/3/2025) 180 tablet 0     No current  facility-administered medications for this visit.     Past Medical History:   Diagnosis Date    CAD (coronary artery disease)     Diabetes (H)     Hypertension      Past Surgical History:   Procedure Laterality Date    BYPASS GRAFT ARTERY CORONARY N/A 2025    Procedure: Median sternotomy, on cardiopulmonary pump, coronary artery bypass graft x 3, left endoscopic harvest of greater saphenous vein, left internal mammary harvest, intraoperative transesophageal echocardiogram by anesthesia;  Surgeon: Miguel Barnes MD;  Location: [facility] OR    COLONOSCOPY N/A 2022    Procedure: COLONOSCOPY, FLEXIBLE, WITH LESION REMOVAL USING SNARE;  Surgeon: Gomez Zurita MD;  Location: [facility]    COLONOSCOPY WITH CO2 INSUFFLATION N/A 2022    Procedure: COLONOSCOPY, WITH CO2 INSUFFLATION;  Surgeon: Gomez Zurita MD;  Location: [facility] OR    CV CORONARY ANGIOGRAM N/A 2025    Procedure: Coronary Angiogram;  Surgeon: Carlos Lepe MD;  [facility]    CV INSTANTANEOUS WAVE-FREE RATIO N/A 2025    Procedure: Instantaneous Wave-Free Ratio;  Surgeon: Carlos Lepe MD;  Location: [facility]    GENITOURINARY SURGERY  Kidney Stones    Vasectomy    ORTHOPEDIC SURGERY Left     PICC DOUBLE LUMEN PLACEMENT Right 2025    Basilic Vein 5F DL 45 cm, 1 cm external catheter length     Allergies   Allergen Reactions    Jardiance [Empagliflozin]      rash     Social History     Socioeconomic History    Marital status:      Spouse name: Not on file    Number of children: Not on file    Years of education: Not on file    Highest education level: Not on file   Occupational History    Not on file   Tobacco Use    Smoking status: Former     Types: Cigars     Quit date: 2025     Years since quittin.3    Smokeless tobacco: Never    Tobacco comments:     Rare cigar   Vaping Use    Vaping status: Never Used    Substance and Sexual Activity    Alcohol use: Not Currently     Comment: 1-2 drinks month    Drug use: Not Currently     Types: Marijuana    Sexual activity: Not Currently     Partners: Female     Birth control/protection: Post-menopausal, Male Surgical   Other Topics Concern    Parent/sibling w/ CABG, MI or angioplasty before 65F 55M? Yes   Social History Narrative    Not on file     Social Drivers of Health     Financial Resource Strain: Low Risk  (4/29/2025)    Financial Resource Strain     Within the past 12 months, have you or your family members you live with been unable to get utilities (heat, electricity) when it was really needed?: No   Food Insecurity: Low Risk  (4/29/2025)    Food Insecurity     Within the past 12 months, did you worry that your food would run out before you got money to buy more?: No     Within the past 12 months, did the food you bought just not last and you didn t have money to get more?: No   Transportation Needs: Low Risk  (4/29/2025)    Transportation Needs     Within the past 12 months, has lack of transportation kept you from medical appointments, getting your medicines, non-medical meetings or appointments, work, or from getting things that you need?: No   Physical Activity: Insufficiently Active (10/30/2024)    Exercise Vital Sign     Days of Exercise per Week: 1 day     Minutes of Exercise per Session: 20 min   Stress: No Stress Concern Present (10/30/2024)    [employer] [employer] - Occupational Stress Questionnaire     Feeling of Stress : Not at all   Social Connections: Unknown (10/30/2024)    Social Connection and Isolation Panel [NHANES]     Frequency of Communication with Friends and Family: Not on file     Frequency of Social Gatherings with Friends and Family: More than three times a week     Attends Anabaptist Services: Not on file     Active Member of Clubs or Organizations: Not on file     Attends Club or Organization Meetings: Not on file      Marital Status: Not on file   Interpersonal Safety: Low Risk  (4/29/2025)    Interpersonal Safety     Do you feel physically and emotionally safe where you currently live?: Yes     Within the past 12 months, have you been hit, slapped, kicked or otherwise physically hurt by someone?: No     Within the past 12 months, have you been humiliated or emotionally abused in other ways by your partner or ex-partner?: No   Housing Stability: High Risk (4/29/2025)    Housing Stability     Do you have housing? : No     Are you worried about losing your housing?: No     Family History   Problem Relation Age of Onset    Alzheimer Disease Mother     Heart Disease Father     Coronary Artery Disease Father     Diabetes Sister     Pulmonary Embolism Sister     Coronary Artery Disease Brother     Diabetes Brother     Anesthesia Reaction No family hx of           REVIEW OF SYSTEMS:  General: negative, fever, chills, night sweats  Skin: negative, acne, rash, and scaling  Eyes: negative, double vision, eye pain, and photophobia  Ears/Nose/Throat: negative, nasal congestion, and purulent rhinorrhea  Respiratory: No dyspnea on exertion, No cough, No hemoptysis, and negative  Cardiovascular: negative, palpitations, tachycardia, irregular heart beat, chest pain, exertional chest pain or pressure, paroxysmal nocturnal dyspnea, dyspnea on exertion, and orthopnea       OBJECTIVE:  Blood pressure (!) 154/96, pulse 71, weight 99.7 kg (219 lb 12.8 oz), SpO2 95%.  General Appearance: healthy, alert, active, and no distress  Head: Normocephalic. No masses, lesions, tenderness or abnormalities  Eyes: conjuctiva clear, PERRL, EOM intact  Ears: External ears normal. Canals clear. TM's normal.  Nose: Nares normal  Mouth: normal  Neck: Supple, no cervical adenopathy, no thyromegaly  Lungs: clear to auscultation  Cardiac: regular rate and rhythm, normal S1 and S2, no murmur       ASSESSMENT/PLAN:  Patient here for post CABG follow-up.  Had CABG x 3 using  saphenous vein graft to LAD, ramus intermedius and RPDA on 4/29/2025.  Postop course was complicated by atrial fibrillation and stroke with left-sided weakness.  A-fib reverted to sinus rhythm on amiodarone.  Patient was discharged home on a small course of amiodarone which she stopped currently.  No complaint of palpitations.  Patient's neurodeficit completely recovered.  Today patient had no cardiac complaints overall he is doing very well.  Starting cardiac rehab next week.  Currently patient is taking Lipitor 80 mg daily and 10 mg daily.  Advised to discontinue the 10 mg Lipitor.  He will continue with 80 mg at night.  Patient is also taking 2 aspirin tablets.  Advised to take 81 mg daily.  Patient's blood pressure is elevated today.  Will start him back on his prior losartan at 25 mg daily.  Patient will monitor his blood pressure at home.  His wounds are well-healed with no discharge or redness.  Patient known to have normal biventricular function, no significant valvular abnormality and no regional wall motion abnormality.  Patient had a Zio patch.  This did not show any recurrence of atrial fibrillation.  Patient will return to clinic in 1 year for a follow-up with a repeat echocardiogram.  Total visit duration 45 minutes.  This includes face-to-face interview, physical exam, chart review, review of operative records inpatient records, prior EKGs echocardiogram and documentation.

## 2025-07-03 NOTE — NURSING NOTE
"Chief Complaint   Patient presents with    Follow Up     Reason for visit: Return general cardiology for follow-up       Initial BP (!) 166/89 (BP Location: Left arm, Patient Position: Sitting, Cuff Size: Adult Regular)   Pulse 77   Wt 99.7 kg (219 lb 12.8 oz)   SpO2 95%   BMI 29.00 kg/m   Estimated body mass index is 29 kg/m  as calculated from the following:    Height as of 5/29/25: 1.854 m (6' 1\").    Weight as of this encounter: 99.7 kg (219 lb 12.8 oz)..  BP completed using cuff size: regular    LEORA Hughes    "

## 2025-07-03 NOTE — PATIENT INSTRUCTIONS
Thank you for coming to the [facility] at [place]; please note the following instructions:    1. In one year : echocardiogram, then see Dr. Avitia afterwards.  You will get a scheduling reminder in advance.   2. Begin losartan - RX sent to pharmacy - please record BP / pulse at home using arm cuff, always after at least 10 minutes of calm rest, keep a log.  Once you have 10 readings from 10 different days, send completed log in one [employer] message for review and advisement.         If you have any questions regarding your visit, please contact your care team:     CARDIOLOGY  TELEPHONE NUMBER   Fabby MERCADO., Registered Nurse  Rima MAYER, Registered Nurse  Dannielle SMITH, Registered Medical Assistant  Jamaica CID, Clinic Assistant  Tayler MAYER, Visit Facilitator  Willie SMITH, Visit Facilitator [phone] (select option 1)    *After hours: [phone]   For Scheduling Appts:     [phone] (select option 1)    *After hours: [phone]   For the Device Clinic (Pacemakers and ICD's)  Maryana HILARIO, Registered Nurse   During business hours: [phone]    *After business hours:  [phone] (select option 4)      Normal test result notifications will be released via [employer] or mailed within 7 business days.  All other test results, will be communicated via telephone once reviewed by your cardiologist.    If you need a medication refill, please contact your pharmacy.  Please allow 3 business days for your refill to be completed.    As always, thank you for trusting us with your health care needs!

## 2025-07-06 ENCOUNTER — MYC REFILL (OUTPATIENT)
Dept: FAMILY MEDICINE | Facility: CLINIC | Age: 70
End: 2025-07-06
Payer: COMMERCIAL

## 2025-07-06 DIAGNOSIS — E11.69 TYPE 2 DIABETES MELLITUS WITH OTHER SPECIFIED COMPLICATION, UNSPECIFIED WHETHER LONG TERM INSULIN USE (H): ICD-10-CM

## 2025-07-14 ENCOUNTER — THERAPY VISIT (OUTPATIENT)
Dept: PHYSICAL THERAPY | Facility: CLINIC | Age: 70
End: 2025-07-14
Attending: NURSE PRACTITIONER
Payer: COMMERCIAL

## 2025-07-14 DIAGNOSIS — I63.413 CEREBRAL INFARCTION DUE TO BILATERAL EMBOLISM OF MIDDLE CEREBRAL ARTERIES (H): Primary | ICD-10-CM

## 2025-07-14 DIAGNOSIS — R26.2 DIFFICULTY WALKING: ICD-10-CM

## 2025-07-14 DIAGNOSIS — Z95.1 S/P CABG (CORONARY ARTERY BYPASS GRAFT): ICD-10-CM

## 2025-07-14 PROCEDURE — 97162 PT EVAL MOD COMPLEX 30 MIN: CPT | Mod: GP | Performed by: PHYSICAL THERAPIST

## 2025-07-14 PROCEDURE — 97110 THERAPEUTIC EXERCISES: CPT | Mod: GP | Performed by: PHYSICAL THERAPIST

## 2025-07-14 NOTE — PROGRESS NOTES
PHYSICAL THERAPY EVALUATION  Type of Visit: Evaluation       Fall Risk Screen:  Have you fallen 2 or more times in the past year?: No  Have you fallen and had an injury in the past year?: No    Subjective         Presenting condition or subjective complaint: Had Coranary bypass surgery lifting, physical activity and lying on side restrictions for 3 months. Overall reports good healing but more tired. Also has order for stroke rehab from 25. Patient believes no lasting side effects afterwards.   Date of onset: 25    Relevant medical history: Diabetes; Heart problems; High blood pressure   Dates & types of surgery: 25 Triple bypass surgery    Prior diagnostic imaging/testing results: MRI; CT scan     Prior therapy history for the same diagnosis, illness or injury: No      Prior Level of Function  Transfers: Independent  Ambulation: Independent  ADL: Independent  IADL: Driving, Laundry, Work, Yard work    Living Environment  Social support: With a significant other or spouse   Type of home: House; 2-story   Stairs to enter the home: Yes 1 Is there a railing: No   Ramp: No   Stairs inside the home: Yes 12 Is there a railing: Yes     Help at home: None  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  Hobbies/Interests: pickleball, golf withheld for 3 months     Patient goals for therapy:  confident returning to PLOF    Pain assessment: Location: chest; incision and pressure /Ratin/10      Objective     OBSERVATION: L hand curl, excessive ER L LE  RANGE OF MOTION: LE ROM WFL  STRENGTH: Myotome testing seated bilat revealed   HF 3+/5 DF 4/5 on L only; weakness L>R SL calf raise and reduced toe height for heel walking L>R  BED MOBILITY: Independent  TRANSFERS: Independent    GAIT:   Level of [place]: Independent  Drop foot on L side with excessive ER and wider gait; cued to mechanics on treadmill with good understanding and appropriate correction    Vitals at rest:  89bpm, 96%  SpO2    SPECIAL TESTS  Functional Gait Assessment (FGA)      10 Meter Walk Test (Comfortable)  4.37 sec   10 Meter Walk Test (Fast)     6 Minute Walk Test (6MWT)   3 min on TM with high heart rate variability; referred back to cardiac rehab before working on LE endurance and activity tolerance      Segundo Balance Scale (BBS)     5 Times Sit-to-Stand (5TSTS)  14 sec      Dynamic Gait Index (DGI)     Timed Up and Go (TUG) - sec    Single Leg Stance Right (sec) 5   Single Leg Stance Left (sec) 0 FALL RISK <5 SEC   Modified CTSIB Conditions (sec) Cond 1: 30  Cond 2: 30  Cond 4: 30  Cond 5 : 30   Romberg  (sec)    Sharpened Romberg (sec)    30 Second Sit to Stand (reps/height) 9 reps (15 age matched norm)   Mini-BESTest              Assessment & Plan   CLINICAL IMPRESSIONS  Medical Diagnosis: S/P CABG; STROKE    Treatment Diagnosis: Force Production Deficit   Impression/Assessment: Patient is a 70 year old male with fatigue complaints.  The following significant findings have been identified: Decreased strength, Impaired balance, Impaired gait, Impaired muscle performance, and Decreased activity tolerance. These impairments interfere with their ability to perform recreational activities, household chores, and community mobility as compared to previous level of function.     Clinical Decision Making (Complexity):  Clinical Presentation: Stable/Uncomplicated  Clinical Presentation Rationale: based on medical and personal factors listed in PT evaluation  Clinical Decision Making (Complexity): Moderate complexity    PLAN OF CARE  Treatment Interventions:  Interventions: Gait Training, Neuromuscular Re-education, Therapeutic Activity, Therapeutic Exercise, Self-Care/Home Management    Long Term Goals     PT Goal 1  Goal Identifier: cleared  Goal Description: Patient will report cleared from cardiac rehab to work towards functional goals of returning to full time work and rec activities  Target Date: 10/11/25      Frequency of  Treatment: 1-2x/wk  Duration of Treatment: up to 90 days    Recommended Referrals to Other Professionals: CARDIAC REHAB  Education Assessment:   Learner/Method: Patient  Education Comments: Eval findings, age-matched scores, goals, POC    Risks and benefits of evaluation/treatment have been explained.   Patient/Family/caregiver agrees with Plan of Care.     Evaluation Time:     PT Sofy, Moderate Complexity Minutes (47370): 30       Signing Clinician: BARBRA LOPEZ PT        [facility]                                                                                   OUTPATIENT PHYSICAL THERAPY      PLAN OF TREATMENT FOR OUTPATIENT REHABILITATION   Patient's Last Name, First Name, CATARINARohit Mckeon YOB: 1955   Provider's Name   [facility]   Medical Record No.  [id number]     Onset Date: 04/29/25  Start of Care Date: 07/14/25     Medical Diagnosis:  S/P CABG; STROKE      PT Treatment Diagnosis:  Force Production Deficit Plan of Treatment  Frequency/Duration: 1-2x/wk/ up to 90 days    Certification date from 07/14/25 to 10/11/25         See note for plan of treatment details and functional goals     BARBRA LOPEZ PT                         I CERTIFY THE NEED FOR THESE SERVICES FURNISHED UNDER        THIS PLAN OF TREATMENT AND WHILE UNDER MY CARE     (Physician attestation of this document indicates review and certification of the therapy plan).              Referring Provider:  Urbano Bell    Initial Assessment  See Epic Evaluation- Start of Care Date: 07/14/25

## 2025-07-15 DIAGNOSIS — Z95.1 S/P CABG (CORONARY ARTERY BYPASS GRAFT): Primary | ICD-10-CM

## 2025-07-23 ENCOUNTER — MYC REFILL (OUTPATIENT)
Dept: FAMILY MEDICINE | Facility: CLINIC | Age: 70
End: 2025-07-23
Payer: COMMERCIAL

## 2025-07-23 DIAGNOSIS — Z95.1 S/P CABG (CORONARY ARTERY BYPASS GRAFT): ICD-10-CM

## 2025-07-23 RX ORDER — AMOXICILLIN 250 MG
2 CAPSULE ORAL 2 TIMES DAILY PRN
Qty: 180 TABLET | Refills: 0 | Status: SHIPPED | OUTPATIENT
Start: 2025-07-23

## 2025-07-28 ENCOUNTER — HOSPITAL ENCOUNTER (OUTPATIENT)
Dept: CARDIAC REHAB | Facility: CLINIC | Age: 70
Discharge: HOME | End: 2025-07-28
Attending: NURSE PRACTITIONER
Payer: COMMERCIAL

## 2025-07-28 DIAGNOSIS — Z95.1 S/P CABG (CORONARY ARTERY BYPASS GRAFT): ICD-10-CM

## 2025-07-28 PROCEDURE — 93798 PHYS/QHP OP CAR RHAB W/ECG: CPT

## 2025-07-31 ENCOUNTER — VIRTUAL VISIT (OUTPATIENT)
Dept: PHARMACY | Facility: CLINIC | Age: 70
End: 2025-07-31
Payer: COMMERCIAL

## 2025-07-31 DIAGNOSIS — I10 HYPERTENSION, UNSPECIFIED TYPE: ICD-10-CM

## 2025-07-31 DIAGNOSIS — I25.10 CORONARY ARTERY DISEASE INVOLVING NATIVE CORONARY ARTERY OF NATIVE HEART, UNSPECIFIED WHETHER ANGINA PRESENT: ICD-10-CM

## 2025-07-31 DIAGNOSIS — E11.69 TYPE 2 DIABETES MELLITUS WITH OTHER SPECIFIED COMPLICATION, UNSPECIFIED WHETHER LONG TERM INSULIN USE (H): Primary | ICD-10-CM

## 2025-07-31 DIAGNOSIS — Z78.9 TAKES DIETARY SUPPLEMENTS: ICD-10-CM

## 2025-07-31 DIAGNOSIS — R52 PAIN: ICD-10-CM

## 2025-07-31 RX ORDER — IRON 18 MG
1 TABLET ORAL DAILY
COMMUNITY

## 2025-07-31 NOTE — PATIENT INSTRUCTIONS
"Recommendations from today's MTM visit:                                                         Increase Januvia to 100mg once daily. New prescription sent to pharmacy.   A1c in 3 months with Dr. Marsh.   Consider stopping fish oil and zinc - no clear benefit from taking at this time.     Follow-up: as needed     It was great speaking with you today.  I value your experience and would be very thankful for your time in providing feedback in our clinic survey. In the next few days, you may receive an email or text message from [employer] with a link to a survey related to your  clinical pharmacist.\"     To schedule another MTM appointment, please call the clinic directly or you may call the MTM scheduling line at [phone].    My Clinical Pharmacist's contact information:                                                      Please feel free to contact me with any questions or concerns you have.      Latoya Diaz, PharmD  Medication Therapy Management (MTM) Pharmacist   "

## 2025-07-31 NOTE — LETTER
_  Medication List        Prepared on: Jul 31, 2025     Bring your Medication List when you go to the doctor, hospital, or   emergency room. And, share it with your family or caregivers.     Note any changes to how you take your medications.  Cross out medications when you no longer use them.    Medication How I take it Why I use it Prescriber   acetaminophen (TYLENOL) 325 MG tablet Take 3 tablets (975 mg) by mouth every 6 hours as needed for mild pain. Pain LINSEY Baptiste CNP   aspirin (ASA) 81 MG chewable tablet Take 1 tablets (81 mg) by mouth daily. Heart disease LINSEY Baptiste CNP   atorvastatin (LIPITOR) 80 MG tablet Take 1 tablet (80 mg) by mouth daily. Cholesterol Martin Marsh MD   Ferrous Sulfate (IRON) 90 (18 Fe) MG TABS Take 1 tablet by mouth daily. Low hemoglobin  Patient Reported   fish oil-omega-3 fatty acids 1000 MG capsule Take 2 g by mouth daily. General Health  Patient Reported   losartan (COZAAR) 25 MG tablet Take 1 tablet (25 mg) by mouth daily. High blood pressure  GEOVANNA Linares MD   magnesium oxide (MAG-OX) 400 MG tablet Take 400 mg by mouth daily. General Health  Patient Reported   metFORMIN (GLUCOPHAGE XR) 500 MG 24 hr tablet TAKE 1 TAB  BY MOUTH DAILY (WITH BREAKFAST) AND 1 TAB DAILY (WITH DINNER) Type 2 diabetes  Martin Marsh MD   sitagliptin (JANUVIA) 100 MG tablet Take 1 tablet (100 mg) by mouth daily. Type 2 diabetes  Martin Marsh MD   zinc gluconate 50 MG tablet Take 50 mg by mouth daily. General Health  Patient Reported         Add new medications, over-the-counter drugs, herbals, vitamins, or  minerals in the blank rows below.    Medication How I take it Why I use it Prescriber                                      Allergies:      - Jardiance [empagliflozin]        Side effects I have had:      Not on File        Other Information:              My notes and questions:

## 2025-07-31 NOTE — PROGRESS NOTES
Medication Therapy Management (MTM) Encounter    ASSESSMENT:                            Medication Adherence/Access: No issues identified.    Diabetes   A1c at goal <7%. Fasting blood glucose not at goal , recommend optimizing Januvia.   UACR at goal <30mg/g.     Hypertension   Blood pressure at goal <130/80 on current regimen. Kidney function is normal.      Hyperlipidemia   LDL at goal <55 on high intensity statin.     Pain   Stable.      Supplements   Consider discontinuing fish oil and zinc due to lack of indication. Patient should get hemoglobin rechecked at follow-up.     PLAN:                            Increase Januvia to 100mg once daily. New prescription sent to pharmacy.   A1c in 3 months with Dr. Marsh.   Consider stopping fish oil and zinc - no clear benefit from taking at this time.     Follow-up: as needed     SUBJECTIVE/OBJECTIVE:                          Rohit William is a 70 year old male seen for a yearly medication review. He was referred to MTM by their insurance plan.      Reason for visit: comprehensive medication review     Allergies/ADRs: Reviewed in chart  Past Medical History: Reviewed in chart  Tobacco: He reports that he quit smoking about 5 months ago. His smoking use included cigars. He has never used smokeless tobacco.  Alcohol: Less than 1 beverage / month    Medication Adherence/Access: no issues reported.    Diabetes   Metformin XR 500mg twice daily - loose stools at higher doses.   Januvia 25mg once daily   Aspirin 81mg daily - cardiology recommended only taking 81mg  No side effects.     Jardiance - rash  Current diabetes symptoms: none     Blood sugar monitorin time(s) daily; Ranges: (patient reported) Fasting- 120-159   Eye exam in the last 12 months? No  Foot exam: due    Hypertension   Losartan 25mg once daily   Metoprolol succinate 100mg once daily - not taking  Amidarone - finished    Patient had CABG x 3 using saphenous vein graft to RPDA, ramus intermedius and LAD  on 4/29/2025. He had postop afib that has resolved. Finished course of amiodarone and metoprolol.   Patient reports no current medication side effects  Patient self monitors blood pressure.  Home BP monitoring less than 130 systolic and 70s diastolic at cardiac rehab.       Hyperlipidemia   Atorvastatin 80mg once daily - has been taking at night.   History of cerebral infarction. S/p CABG.   Patient reports no significant myalgias or other side effects.  The ASCVD Risk score (Javier DK, et al., 2019) failed to calculate for the following reasons:    Risk score cannot be calculated because patient has a medical history suggesting prior/existing ASCVD     Pain   Acetaminophen 1300mg once daily - for arthritis.   Patient feels that current therapy is effective.   Patient reports the following side effects: no medication side effects     Supplements   Fish oil 2g once daily   Magnesium oxide 400mg once daily - for sleep.   Zinc 50mg once daily  Iron 18mg once daily   No reported issues at this time.      Today's Vitals: There were no vitals taken for this visit.  ----------------    I spent 15 minutes with this patient today. All changes were made via collaborative practice agreement with Martin Marsh MD.     A summary of these recommendations was sent via [employer].    Zac ArmasD  Medication Therapy Management (MTM) Pharmacist    Telemedicine Visit Details  The patient's medications can be safely assessed via a telemedicine encounter.  Type of service:  Telephone visit  Originating Location (pt. Location): Work    Distant Location (provider location):  Off-site  Start Time: 2:39 PM  End Time: 2:54 PM     Medication Therapy Recommendations  Type 2 diabetes mellitus with other specified complication, unspecified whether long term insulin use (H)   1 Current Medication: sitagliptin (JANUVIA) 25 MG tablet (Discontinued)   Current Medication Sig: Take 1 tablet (25 mg) by mouth daily.   Rationale: Dose too low -  Dosage too low - Effectiveness   Recommendation: Increase Dose   Status: Accepted per CPA   Identified Date: 7/31/2025 Completed Date: 7/31/2025

## 2025-07-31 NOTE — LETTER
July 31, 2025  Rohit DIAZ William  [address] [facility] [address]    Dear Mr. William, [facility]     Thank you for talking with me on Jul 31, 2025 about your health and medications. As a follow-up to our conversation, I have included two documents:      Your Recommended To-Do List has steps you should take to get the best results from your medications.  Your Medication List will help you keep track of your medications and how to take them.    If you want to talk about these documents, please call Latoya Diaz at phone: [phone], Monday-Friday 8-4:30pm.    I look forward to working with you and your doctors to make sure your medications work well for you.    Sincerely,  Latoya Diaz  [facility] Pharmacist, [facility]

## 2025-07-31 NOTE — LETTER
"Recommended To-Do List      Prepared on: Jul 31, 2025       You can get the best results from your medications by completing the items on this \"To-Do List.\"      Bring your To-Do List when you go to your doctor. And, share it with your family or caregivers.    My To-Do List:  What we talked about: What I should do:   Your medication dosage being too low    Increase your dosage of sitagliptin (RANJANUVIA)          What we talked about: What I should do:                     "

## 2025-08-04 ENCOUNTER — HOSPITAL ENCOUNTER (OUTPATIENT)
Dept: CARDIAC REHAB | Facility: CLINIC | Age: 70
Discharge: HOME | End: 2025-08-04
Attending: THORACIC SURGERY (CARDIOTHORACIC VASCULAR SURGERY)
Payer: COMMERCIAL

## 2025-08-04 PROCEDURE — 93798 PHYS/QHP OP CAR RHAB W/ECG: CPT

## 2025-08-06 ENCOUNTER — HOSPITAL ENCOUNTER (OUTPATIENT)
Dept: CARDIAC REHAB | Facility: CLINIC | Age: 70
Discharge: HOME | End: 2025-08-06
Attending: THORACIC SURGERY (CARDIOTHORACIC VASCULAR SURGERY)
Payer: COMMERCIAL

## 2025-08-06 ENCOUNTER — HOSPITAL ENCOUNTER (EMERGENCY)
Facility: CLINIC | Age: 70
Discharge: HOME | End: 2025-08-06
Attending: EMERGENCY MEDICINE | Admitting: EMERGENCY MEDICINE
Payer: COMMERCIAL

## 2025-08-06 VITALS
SYSTOLIC BLOOD PRESSURE: 146 MMHG | WEIGHT: 220 LBS | HEART RATE: 78 BPM | RESPIRATION RATE: 11 BRPM | OXYGEN SATURATION: 97 % | BODY MASS INDEX: 29.03 KG/M2 | DIASTOLIC BLOOD PRESSURE: 93 MMHG

## 2025-08-06 DIAGNOSIS — Z86.79 HISTORY OF ATRIAL FIBRILLATION: Primary | ICD-10-CM

## 2025-08-06 DIAGNOSIS — R00.0 TACHYCARDIA, UNSPECIFIED: ICD-10-CM

## 2025-08-06 DIAGNOSIS — I47.10 SVT (SUPRAVENTRICULAR TACHYCARDIA): ICD-10-CM

## 2025-08-06 LAB
ALBUMIN SERPL BCG-MCNC: 4.2 G/DL (ref 3.5–5.2)
ALP SERPL-CCNC: 68 U/L (ref 40–150)
ALT SERPL W P-5'-P-CCNC: 15 U/L (ref 0–70)
ANION GAP SERPL CALCULATED.3IONS-SCNC: 13 MMOL/L (ref 7–15)
AST SERPL W P-5'-P-CCNC: 19 U/L (ref 0–45)
BASOPHILS # BLD AUTO: 0 10E3/UL (ref 0–0.2)
BASOPHILS NFR BLD AUTO: 1 %
BILIRUB SERPL-MCNC: 0.4 MG/DL
BUN SERPL-MCNC: 21.3 MG/DL (ref 8–23)
CALCIUM SERPL-MCNC: 9.4 MG/DL (ref 8.8–10.4)
CHLORIDE SERPL-SCNC: 101 MMOL/L (ref 98–107)
CREAT SERPL-MCNC: 0.76 MG/DL (ref 0.67–1.17)
D DIMER PPP FEU-MCNC: 0.48 UG/ML FEU (ref 0–0.5)
EGFRCR SERPLBLD CKD-EPI 2021: >90 ML/MIN/1.73M2
EOSINOPHIL # BLD AUTO: 0.3 10E3/UL (ref 0–0.7)
EOSINOPHIL NFR BLD AUTO: 4 %
ERYTHROCYTE [DISTWIDTH] IN BLOOD BY AUTOMATED COUNT: 15 % (ref 10–15)
GLUCOSE SERPL-MCNC: 156 MG/DL (ref 70–99)
HCO3 SERPL-SCNC: 23 MMOL/L (ref 22–29)
HCT VFR BLD AUTO: 39.9 % (ref 40–53)
HGB BLD-MCNC: 13.2 G/DL (ref 13.3–17.7)
HOLD SPECIMEN: NORMAL
IMM GRANULOCYTES # BLD: 0 10E3/UL
IMM GRANULOCYTES NFR BLD: 0 %
LYMPHOCYTES # BLD AUTO: 1.6 10E3/UL (ref 0.8–5.3)
LYMPHOCYTES NFR BLD AUTO: 19 %
MAGNESIUM SERPL-MCNC: 1.9 MG/DL (ref 1.7–2.3)
MCH RBC QN AUTO: 28.1 PG (ref 26.5–33)
MCHC RBC AUTO-ENTMCNC: 33.1 G/DL (ref 31.5–36.5)
MCV RBC AUTO: 85 FL (ref 78–100)
MONOCYTES # BLD AUTO: 0.7 10E3/UL (ref 0–1.3)
MONOCYTES NFR BLD AUTO: 9 %
NEUTROPHILS # BLD AUTO: 5.5 10E3/UL (ref 1.6–8.3)
NEUTROPHILS NFR BLD AUTO: 67 %
NRBC # BLD AUTO: 0 10E3/UL
NRBC BLD AUTO-RTO: 0 /100
NT-PROBNP SERPL-MCNC: 379 PG/ML (ref 0–229)
PLATELET # BLD AUTO: 223 10E3/UL (ref 150–450)
POTASSIUM SERPL-SCNC: 4.1 MMOL/L (ref 3.4–5.3)
PROT SERPL-MCNC: 7.2 G/DL (ref 6.4–8.3)
RBC # BLD AUTO: 4.69 10E6/UL (ref 4.4–5.9)
SODIUM SERPL-SCNC: 137 MMOL/L (ref 135–145)
TROPONIN T SERPL HS-MCNC: 14 NG/L
TROPONIN T SERPL HS-MCNC: 19 NG/L
TSH SERPL DL<=0.005 MIU/L-ACNC: 2.27 UIU/ML (ref 0.3–4.2)
WBC # BLD AUTO: 8.2 10E3/UL (ref 4–11)

## 2025-08-06 PROCEDURE — 36415 COLL VENOUS BLD VENIPUNCTURE: CPT | Performed by: EMERGENCY MEDICINE

## 2025-08-06 PROCEDURE — 93005 ELECTROCARDIOGRAM TRACING: CPT | Performed by: EMERGENCY MEDICINE

## 2025-08-06 PROCEDURE — 99285 EMERGENCY DEPT VISIT HI MDM: CPT | Mod: 25 | Performed by: EMERGENCY MEDICINE

## 2025-08-06 PROCEDURE — 85025 COMPLETE CBC W/AUTO DIFF WBC: CPT | Performed by: EMERGENCY MEDICINE

## 2025-08-06 PROCEDURE — 999N000104 HC STATISTIC NO CHARGE

## 2025-08-06 PROCEDURE — 36415 COLL VENOUS BLD VENIPUNCTURE: CPT

## 2025-08-06 PROCEDURE — 82310 ASSAY OF CALCIUM: CPT | Performed by: EMERGENCY MEDICINE

## 2025-08-06 PROCEDURE — 85025 COMPLETE CBC W/AUTO DIFF WBC: CPT

## 2025-08-06 PROCEDURE — 84443 ASSAY THYROID STIM HORMONE: CPT | Performed by: EMERGENCY MEDICINE

## 2025-08-06 PROCEDURE — 83880 ASSAY OF NATRIURETIC PEPTIDE: CPT | Performed by: EMERGENCY MEDICINE

## 2025-08-06 PROCEDURE — 84484 ASSAY OF TROPONIN QUANT: CPT | Performed by: EMERGENCY MEDICINE

## 2025-08-06 PROCEDURE — 83735 ASSAY OF MAGNESIUM: CPT | Performed by: EMERGENCY MEDICINE

## 2025-08-06 PROCEDURE — 85379 FIBRIN DEGRADATION QUANT: CPT | Performed by: EMERGENCY MEDICINE

## 2025-08-06 ASSESSMENT — COLUMBIA-SUICIDE SEVERITY RATING SCALE - C-SSRS
1. IN THE PAST MONTH, HAVE YOU WISHED YOU WERE DEAD OR WISHED YOU COULD GO TO SLEEP AND NOT WAKE UP?: NO
6. HAVE YOU EVER DONE ANYTHING, STARTED TO DO ANYTHING, OR PREPARED TO DO ANYTHING TO END YOUR LIFE?: NO
2. HAVE YOU ACTUALLY HAD ANY THOUGHTS OF KILLING YOURSELF IN THE PAST MONTH?: NO

## 2025-08-06 ASSESSMENT — ACTIVITIES OF DAILY LIVING (ADL)
ADLS_ACUITY_SCORE: 57

## 2025-08-07 ENCOUNTER — PATIENT OUTREACH (OUTPATIENT)
Dept: CARE COORDINATION | Facility: CLINIC | Age: 70
End: 2025-08-07
Payer: COMMERCIAL

## 2025-08-11 ENCOUNTER — OFFICE VISIT (OUTPATIENT)
Dept: FAMILY MEDICINE | Facility: CLINIC | Age: 70
End: 2025-08-11
Payer: COMMERCIAL

## 2025-08-11 ENCOUNTER — HOSPITAL ENCOUNTER (OUTPATIENT)
Dept: CARDIAC REHAB | Facility: CLINIC | Age: 70
Discharge: HOME | End: 2025-08-11
Attending: THORACIC SURGERY (CARDIOTHORACIC VASCULAR SURGERY)
Payer: COMMERCIAL

## 2025-08-11 VITALS
OXYGEN SATURATION: 97 % | SYSTOLIC BLOOD PRESSURE: 132 MMHG | DIASTOLIC BLOOD PRESSURE: 78 MMHG | HEIGHT: 73 IN | BODY MASS INDEX: 29.55 KG/M2 | TEMPERATURE: 97.6 F | RESPIRATION RATE: 16 BRPM | WEIGHT: 223 LBS | HEART RATE: 98 BPM

## 2025-08-11 DIAGNOSIS — E11.69 TYPE 2 DIABETES MELLITUS WITH OTHER SPECIFIED COMPLICATION, UNSPECIFIED WHETHER LONG TERM INSULIN USE (H): ICD-10-CM

## 2025-08-11 DIAGNOSIS — I47.10 SUPRAVENTRICULAR TACHYCARDIA: Primary | ICD-10-CM

## 2025-08-11 DIAGNOSIS — I25.10 CORONARY ARTERY DISEASE INVOLVING NATIVE CORONARY ARTERY OF NATIVE HEART, UNSPECIFIED WHETHER ANGINA PRESENT: ICD-10-CM

## 2025-08-11 DIAGNOSIS — Z95.1 S/P CABG (CORONARY ARTERY BYPASS GRAFT): ICD-10-CM

## 2025-08-11 PROCEDURE — 3078F DIAST BP <80 MM HG: CPT | Performed by: INTERNAL MEDICINE

## 2025-08-11 PROCEDURE — 99213 OFFICE O/P EST LOW 20 MIN: CPT | Performed by: INTERNAL MEDICINE

## 2025-08-11 PROCEDURE — 93798 PHYS/QHP OP CAR RHAB W/ECG: CPT

## 2025-08-11 PROCEDURE — 3075F SYST BP GE 130 - 139MM HG: CPT | Performed by: INTERNAL MEDICINE

## 2025-08-11 PROCEDURE — 1126F AMNT PAIN NOTED NONE PRSNT: CPT | Performed by: INTERNAL MEDICINE

## 2025-08-11 ASSESSMENT — PAIN SCALES - GENERAL: PAINLEVEL_OUTOF10: NO PAIN (0)

## 2025-08-13 ENCOUNTER — HOSPITAL ENCOUNTER (OUTPATIENT)
Dept: CARDIAC REHAB | Facility: CLINIC | Age: 70
Discharge: HOME | End: 2025-08-13
Attending: THORACIC SURGERY (CARDIOTHORACIC VASCULAR SURGERY)
Payer: COMMERCIAL

## 2025-08-13 PROCEDURE — 93798 PHYS/QHP OP CAR RHAB W/ECG: CPT

## 2025-08-15 ENCOUNTER — HOSPITAL ENCOUNTER (OUTPATIENT)
Dept: CARDIAC REHAB | Facility: CLINIC | Age: 70
Discharge: HOME | End: 2025-08-15
Attending: THORACIC SURGERY (CARDIOTHORACIC VASCULAR SURGERY)
Payer: COMMERCIAL

## 2025-08-15 PROCEDURE — 93798 PHYS/QHP OP CAR RHAB W/ECG: CPT

## 2025-08-18 ENCOUNTER — HOSPITAL ENCOUNTER (OUTPATIENT)
Dept: CARDIAC REHAB | Facility: CLINIC | Age: 70
Discharge: HOME | End: 2025-08-18
Attending: THORACIC SURGERY (CARDIOTHORACIC VASCULAR SURGERY)
Payer: COMMERCIAL

## 2025-08-18 PROCEDURE — 93798 PHYS/QHP OP CAR RHAB W/ECG: CPT

## 2025-08-20 ENCOUNTER — HOSPITAL ENCOUNTER (OUTPATIENT)
Dept: CARDIAC REHAB | Facility: CLINIC | Age: 70
Discharge: HOME | End: 2025-08-20
Attending: THORACIC SURGERY (CARDIOTHORACIC VASCULAR SURGERY)
Payer: COMMERCIAL

## 2025-08-20 ENCOUNTER — DOCUMENTATION ONLY (OUTPATIENT)
Dept: OTHER | Facility: CLINIC | Age: 70
End: 2025-08-20
Payer: COMMERCIAL

## 2025-08-20 PROCEDURE — 93798 PHYS/QHP OP CAR RHAB W/ECG: CPT

## 2025-08-25 ENCOUNTER — HOSPITAL ENCOUNTER (OUTPATIENT)
Dept: CARDIAC REHAB | Facility: CLINIC | Age: 70
Discharge: HOME | End: 2025-08-25
Attending: THORACIC SURGERY (CARDIOTHORACIC VASCULAR SURGERY)
Payer: COMMERCIAL

## 2025-08-25 ENCOUNTER — PATIENT OUTREACH (OUTPATIENT)
Dept: CARE COORDINATION | Facility: CLINIC | Age: 70
End: 2025-08-25
Payer: COMMERCIAL

## 2025-08-25 ENCOUNTER — TELEPHONE (OUTPATIENT)
Dept: FAMILY MEDICINE | Facility: CLINIC | Age: 70
End: 2025-08-25
Payer: COMMERCIAL

## 2025-08-25 DIAGNOSIS — I47.10 SUPRAVENTRICULAR TACHYCARDIA: Primary | ICD-10-CM

## 2025-08-25 PROCEDURE — 93798 PHYS/QHP OP CAR RHAB W/ECG: CPT

## 2025-08-25 RX ORDER — METOPROLOL SUCCINATE 50 MG/1
50 TABLET, EXTENDED RELEASE ORAL DAILY
Qty: 30 TABLET | Refills: 1 | Status: SHIPPED | OUTPATIENT
Start: 2025-08-25

## 2025-08-27 ENCOUNTER — HOSPITAL ENCOUNTER (OUTPATIENT)
Dept: CARDIAC REHAB | Facility: CLINIC | Age: 70
Discharge: HOME | End: 2025-08-27
Attending: THORACIC SURGERY (CARDIOTHORACIC VASCULAR SURGERY)
Payer: COMMERCIAL

## 2025-08-27 PROCEDURE — 93798 PHYS/QHP OP CAR RHAB W/ECG: CPT

## 2025-08-29 ENCOUNTER — HOSPITAL ENCOUNTER (OUTPATIENT)
Dept: CARDIAC REHAB | Facility: CLINIC | Age: 70
Discharge: HOME | End: 2025-08-29
Attending: THORACIC SURGERY (CARDIOTHORACIC VASCULAR SURGERY)
Payer: COMMERCIAL

## 2025-08-29 PROCEDURE — 93798 PHYS/QHP OP CAR RHAB W/ECG: CPT

## 2025-09-03 ENCOUNTER — HOSPITAL ENCOUNTER (OUTPATIENT)
Dept: CARDIAC REHAB | Facility: CLINIC | Age: 70
Discharge: HOME | End: 2025-09-03
Attending: THORACIC SURGERY (CARDIOTHORACIC VASCULAR SURGERY)
Payer: COMMERCIAL

## 2025-09-03 PROCEDURE — 93797 PHYS/QHP OP CAR RHAB WO ECG: CPT

## 2025-09-03 PROCEDURE — 93798 PHYS/QHP OP CAR RHAB W/ECG: CPT

## (undated) DEVICE — VALVE HEMOSTASIS .096" COPILOT MECH 1003331

## (undated) DEVICE — DRAIN CHEST TUBE RIGHT ANGLED 28FR 8128

## (undated) DEVICE — PUNCH AORTIC 4.0MMX8" RCB40

## (undated) DEVICE — DECANTER BAG 2002S

## (undated) DEVICE — SU STRATAFIX MONOCRYL 3-0 SPIRAL PS-2 45CM SXMP1B107

## (undated) DEVICE — SYR 10ML LL W/O NDL 302995

## (undated) DEVICE — Device

## (undated) DEVICE — SU PROLENE 8-0 BV130-5DA 24" 8732H

## (undated) DEVICE — WAND SUCTION LP SOFT 15.2CM SU-22702

## (undated) DEVICE — DRAIN CHEST TUBE 32FR STR 8032

## (undated) DEVICE — DRAPE IOBAN INCISE 23X17" 6650EZ

## (undated) DEVICE — CATH JACKY 5FR 3.5 CURVE 40-5023

## (undated) DEVICE — SURGICEL HEMOSTAT 4X8" 1952

## (undated) DEVICE — LINEN GOWN XLG 5407

## (undated) DEVICE — DRAPE FLUID WARMING 52 X 60" ORS-321

## (undated) DEVICE — KIT HAND CONTROL ACIST 014644 AR-P54

## (undated) DEVICE — BNDG ELASTIC 4"X5YDS STERILE 6611-4S

## (undated) DEVICE — RX SURGIFLO HEMOSTATIC MATRIX W/THROMBIN 8ML 2994

## (undated) DEVICE — GUIDEWIRE OPTOWIRE 3 W/O GAUGE FACTOR CONNECTOR F1032

## (undated) DEVICE — CLIP HORIZON MED BLUE 002200

## (undated) DEVICE — DEVICE TISSUE STABILIZATION OCTOBASE 28707

## (undated) DEVICE — SUCTION MANIFOLD NEPTUNE 2 SYS 4 PORT 0702-020-000

## (undated) DEVICE — SU PROLENE 5-0 RB-2DA 30" 8710H

## (undated) DEVICE — SU DERMABOND PRINEO 22CM CLR222US

## (undated) DEVICE — CLIP HORIZON SM RED WIDE SLOT 001201

## (undated) DEVICE — SU PROLENE 6-0 C-1DA 30" 8706H

## (undated) DEVICE — PACK HEART LEFT CUSTOM

## (undated) DEVICE — SU ETHIBOND 3-0 BBDA 36" X588H

## (undated) DEVICE — ESU ELEC BLADE 2.75" COATED/INSULATED E1455

## (undated) DEVICE — PACK ADULT HEART UMMC PV15CG92D

## (undated) DEVICE — CLIP SPRING FOGARTY SOFTJAW CSOFT6

## (undated) DEVICE — CATH GD VISTA BRITE BLU YLW 100CM 6FR XB3.5 6700540E

## (undated) DEVICE — DRSG TELFA 3X8" 1238

## (undated) DEVICE — LEAD PACER MYOCARDIAL BIPOLAR TEMPORARY 53CM 6495F

## (undated) DEVICE — SLEEVE TR BAND RADIAL COMPRESSION DEVICE 24CM TRB24-REG

## (undated) DEVICE — SUCTION DRY CHEST DRAIN OASIS 3600-100

## (undated) DEVICE — SU STRATAFIX PDS PLUS 0 CT 45CM SXPP1A406

## (undated) DEVICE — DRSG ABDOMINAL 07 1/2X8" 7197D

## (undated) DEVICE — ESU ELEC BLADE E-SEP INSULATED NEPTUNE 165MM 0703-165-002

## (undated) DEVICE — BONE WAX 2.5GM W31G

## (undated) DEVICE — BLADE KNIFE BEAVER MICROSHARP GREEN 377515

## (undated) DEVICE — KIT DVC ANGIO IBASIXCOMPAK 13INX20ML 3WAY IN4430

## (undated) DEVICE — BLADE KNIFE BEAVER MINI STR BEAVER6900

## (undated) DEVICE — SU SILK 0 TIE 6X30" A306H

## (undated) DEVICE — KIT ENDO VASOVIEW HEMOPRO 2 VH-4000

## (undated) DEVICE — SOL WATER IRRIG 1000ML BOTTLE 2F7114

## (undated) DEVICE — LINEN TOWEL PACK X30 5481

## (undated) DEVICE — SU STEEL 6 CCS 4X18" M654G

## (undated) DEVICE — BNDG ELASTIC 6"X5YDS STERILE 6611-6S

## (undated) DEVICE — CABLE MYO/LEAD PACING WHITE DISP 019-530

## (undated) DEVICE — GLOVE BIOGEL PI MICRO SZ 7.5 48575

## (undated) DEVICE — PREP CHLORAPREP 26ML TINTED HI-LITE ORANGE 930815

## (undated) DEVICE — SOL NACL 0.9% IRRIG 3000ML BAG 2B7127

## (undated) DEVICE — FASTENER CATH BALLOON CLAMPX2 STATLOCK 0684-00-493

## (undated) DEVICE — WIPES FOLEY CARE SURESTEP PROVON DFC100

## (undated) DEVICE — SOL NACL 0.9% IRRIG 1000ML BOTTLE 2F7124

## (undated) DEVICE — SU PROLENE 4-0 RB-1DA 36" 8557H

## (undated) DEVICE — SU ETHIBOND 0 CT-1 CR 8X18" CX21D

## (undated) DEVICE — TUBING PRESSURE 30"

## (undated) DEVICE — TUBING SUCTION DRAINAGE PLEURAL DUAL 8884714200

## (undated) DEVICE — POSITIONER ASSIST ESTECH 3S T401210S

## (undated) DEVICE — SU PROLENE 7-0 BV-1DA 4X24" M8702

## (undated) DEVICE — TIES BANDING T50R

## (undated) DEVICE — INTRO GLIDESHEATH SLENDER 6FR 10X45CM 60-1060

## (undated) DEVICE — LINEN TOWEL PACK X6 WHITE 5487

## (undated) DEVICE — BLADE SAW STRK STERNAL 6207-97-101

## (undated) DEVICE — SU PROLENE 7-0 BV-1DA 24" 8702H

## (undated) DEVICE — SYR 01ML 27GA 0.5" NDL TBC 309623

## (undated) DEVICE — PATCH SURGICAL EVARREST FIBRIN SEALANT 4X2IN EVT5024

## (undated) DEVICE — SPECIMEN CONTAINER 5OZ STERILE 2600SA

## (undated) DEVICE — DRSG DRAIN 4X4" 7086

## (undated) DEVICE — SU STRATAFIX PDS PLUS 2-0 SPIRAL CT-1 45CM SXPP1B411

## (undated) DEVICE — CANNULA VESSEL 3ML BEVELED DPL 30000

## (undated) DEVICE — SU PLEDGET SOFT TFE 3/8"X3/26"X1/16" PCP40

## (undated) DEVICE — CLIP HORIZON LG ORANGE 004200

## (undated) DEVICE — SU STEEL MYO/WIRE II STERNOTOMY 8 BE-1 3X14" 048-217

## (undated) DEVICE — MANIFOLD KIT ANGIO AUTOMATED 014613

## (undated) DEVICE — SU VICRYL 2-0 CT-1 27" UND J259H

## (undated) DEVICE — KIT ENDO FIRST STEP DISINFECTANT 200ML W/POUCH EP-4

## (undated) DEVICE — TUBING INSUFFLATION PNEUMOCLEAR 0620050100

## (undated) DEVICE — DEFIB PRO-PADZ LVP LQD GEL ADULT 8900-2105-01

## (undated) DEVICE — NDL BLUNT 18GA 1" W/O FILTER 305181

## (undated) DEVICE — TAPE MEDIPORE 4"X2YD 2864

## (undated) DEVICE — PAD CHUX UNDERPAD 23X24" 7136

## (undated) DEVICE — SU VICRYL+ 3-0 FS1 27IN UND VCP442H

## (undated) DEVICE — SUCTION CATH AIRLIFE TRI-FLO W/CONTROL PORT 14FR  T60C

## (undated) DEVICE — ESU PENCIL SMOKE EVAC W/ROCKER SWITCH 0703-047-000

## (undated) DEVICE — PROTECTOR ARM ONE-STEP TRENDELENBURG 40418 (COI)

## (undated) DEVICE — ANTIFOG SOLUTION W/FOAM PAD 31142527

## (undated) DEVICE — SU PROLENE 6-0 C-1DA 4X24" M8726

## (undated) DEVICE — SU PROLENE 4-0 SHDA 36" 8521H

## (undated) DEVICE — SU ETHIBOND 2-0 SHDA 30" X563H

## (undated) DEVICE — GUIDE WIRE NITRIX NIT 0 DEGREE .018INX60CM N180601

## (undated) DEVICE — SU PROLENE 3-0 SHDA 36" 8522H

## (undated) RX ORDER — CEFAZOLIN SODIUM 1 G/3ML
INJECTION, POWDER, FOR SOLUTION INTRAMUSCULAR; INTRAVENOUS
Status: DISPENSED
Start: 2025-04-29

## (undated) RX ORDER — HEPARIN SODIUM 200 [USP'U]/100ML
INJECTION, SOLUTION INTRAVENOUS
Status: DISPENSED
Start: 2025-02-13

## (undated) RX ORDER — FENTANYL CITRATE 50 UG/ML
INJECTION, SOLUTION INTRAMUSCULAR; INTRAVENOUS
Status: DISPENSED
Start: 2022-12-07

## (undated) RX ORDER — METOPROLOL TARTRATE 50 MG
TABLET ORAL
Status: DISPENSED
Start: 2025-01-31

## (undated) RX ORDER — HEPARIN SODIUM 1000 [USP'U]/ML
INJECTION, SOLUTION INTRAVENOUS; SUBCUTANEOUS
Status: DISPENSED
Start: 2025-04-29

## (undated) RX ORDER — SODIUM CHLORIDE 9 MG/ML
INJECTION, SOLUTION INTRAVENOUS
Status: DISPENSED
Start: 2025-04-29

## (undated) RX ORDER — ONDANSETRON 2 MG/ML
INJECTION INTRAMUSCULAR; INTRAVENOUS
Status: DISPENSED
Start: 2025-04-29

## (undated) RX ORDER — CHLORHEXIDINE GLUCONATE ORAL RINSE 1.2 MG/ML
SOLUTION DENTAL
Status: DISPENSED
Start: 2025-04-29

## (undated) RX ORDER — FENTANYL CITRATE-0.9 % NACL/PF 10 MCG/ML
PLASTIC BAG, INJECTION (ML) INTRAVENOUS
Status: DISPENSED
Start: 2025-04-29

## (undated) RX ORDER — FENTANYL CITRATE 50 UG/ML
INJECTION, SOLUTION INTRAMUSCULAR; INTRAVENOUS
Status: DISPENSED
Start: 2025-02-13

## (undated) RX ORDER — GABAPENTIN 100 MG/1
CAPSULE ORAL
Status: DISPENSED
Start: 2025-04-29

## (undated) RX ORDER — FENTANYL CITRATE 50 UG/ML
INJECTION, SOLUTION INTRAMUSCULAR; INTRAVENOUS
Status: DISPENSED
Start: 2025-04-29

## (undated) RX ORDER — ACETAMINOPHEN 325 MG/1
TABLET ORAL
Status: DISPENSED
Start: 2025-04-29

## (undated) RX ORDER — CALCIUM CHLORIDE 100 MG/ML
INJECTION INTRAVENOUS; INTRAVENTRICULAR
Status: DISPENSED

## (undated) RX ORDER — CALCIUM CHLORIDE 100 MG/ML
INJECTION INTRAVENOUS; INTRAVENTRICULAR
Status: DISPENSED
Start: 2025-04-29

## (undated) RX ORDER — ASPIRIN 81 MG/1
TABLET, CHEWABLE ORAL
Status: DISPENSED
Start: 2025-04-29

## (undated) RX ORDER — ALBUMIN (HUMAN) 12.5 G/50ML
SOLUTION INTRAVENOUS
Status: DISPENSED

## (undated) RX ORDER — NICARDIPINE HCL-0.9% SOD CHLOR 1 MG/10 ML
SYRINGE (ML) INTRAVENOUS
Status: DISPENSED
Start: 2025-02-13

## (undated) RX ORDER — PROPOFOL 10 MG/ML
INJECTION, EMULSION INTRAVENOUS
Status: DISPENSED
Start: 2025-04-29

## (undated) RX ORDER — HEPARIN SODIUM 1000 [USP'U]/ML
INJECTION, SOLUTION INTRAVENOUS; SUBCUTANEOUS
Status: DISPENSED
Start: 2025-02-13

## (undated) RX ORDER — HEPARIN SODIUM 1000 [USP'U]/ML
INJECTION, SOLUTION INTRAVENOUS; SUBCUTANEOUS
Status: DISPENSED

## (undated) RX ORDER — INDOMETHACIN 25 MG/1
CAPSULE ORAL
Status: DISPENSED
Start: 2025-04-29

## (undated) RX ORDER — FENTANYL CITRATE-0.9 % NACL/PF 10 MCG/ML
PLASTIC BAG, INJECTION (ML) INTRAVENOUS
Status: DISPENSED

## (undated) RX ORDER — HYDROMORPHONE HYDROCHLORIDE 1 MG/ML
INJECTION, SOLUTION INTRAMUSCULAR; INTRAVENOUS; SUBCUTANEOUS
Status: DISPENSED
Start: 2025-04-29

## (undated) RX ORDER — CEFAZOLIN SODIUM/WATER 2 G/20 ML
SYRINGE (ML) INTRAVENOUS
Status: DISPENSED
Start: 2025-04-29

## (undated) RX ORDER — PAPAVERINE HYDROCHLORIDE 30 MG/ML
INJECTION INTRAMUSCULAR; INTRAVENOUS
Status: DISPENSED
Start: 2025-04-29

## (undated) RX ORDER — FUROSEMIDE 10 MG/ML
INJECTION INTRAMUSCULAR; INTRAVENOUS
Status: DISPENSED
Start: 2025-04-29

## (undated) RX ORDER — FAMOTIDINE 20 MG/1
TABLET, FILM COATED ORAL
Status: DISPENSED
Start: 2025-04-29

## (undated) RX ORDER — SODIUM CHLORIDE, SODIUM GLUCONATE, SODIUM ACETATE, POTASSIUM CHLORIDE AND MAGNESIUM CHLORIDE 526; 502; 368; 37; 30 MG/100ML; MG/100ML; MG/100ML; MG/100ML; MG/100ML
INJECTION, SOLUTION INTRAVENOUS
Status: DISPENSED

## (undated) RX ORDER — NITROGLYCERIN 0.4 MG/1
TABLET SUBLINGUAL
Status: DISPENSED
Start: 2025-01-31

## (undated) RX ORDER — NITROGLYCERIN 5 MG/ML
VIAL (ML) INTRAVENOUS
Status: DISPENSED
Start: 2025-02-13

## (undated) RX ORDER — IVABRADINE 5 MG/1
TABLET, FILM COATED ORAL
Status: DISPENSED
Start: 2025-01-31

## (undated) RX ORDER — PROTAMINE SULFATE 10 MG/ML
INJECTION, SOLUTION INTRAVENOUS
Status: DISPENSED

## (undated) RX ORDER — DEXAMETHASONE SODIUM PHOSPHATE 4 MG/ML
INJECTION, SOLUTION INTRA-ARTICULAR; INTRALESIONAL; INTRAMUSCULAR; INTRAVENOUS; SOFT TISSUE
Status: DISPENSED
Start: 2025-04-29